# Patient Record
Sex: FEMALE | Race: WHITE | Employment: OTHER
[De-identification: names, ages, dates, MRNs, and addresses within clinical notes are randomized per-mention and may not be internally consistent; named-entity substitution may affect disease eponyms.]

---

## 2021-02-02 PROBLEM — E78.2 MIXED HYPERLIPIDEMIA: Status: ACTIVE | Noted: 2021-02-02

## 2021-02-02 PROBLEM — E55.9 VITAMIN D DEFICIENCY: Status: ACTIVE | Noted: 2021-02-02

## 2021-02-02 PROBLEM — D18.09 FACIAL HEMANGIOMA: Status: ACTIVE | Noted: 2021-02-02

## 2021-02-02 PROBLEM — E11.9 TYPE 2 DIABETES MELLITUS WITHOUT COMPLICATION, WITHOUT LONG-TERM CURRENT USE OF INSULIN (HCC): Status: ACTIVE | Noted: 2021-02-02

## 2021-02-02 PROBLEM — I10 ESSENTIAL HYPERTENSION: Status: ACTIVE | Noted: 2021-02-02

## 2021-07-21 PROBLEM — L25.9 CONTACT DERMATITIS: Status: ACTIVE | Noted: 2021-07-21

## 2021-07-21 PROBLEM — R63.5 WEIGHT GAIN: Status: ACTIVE | Noted: 2021-07-21

## 2022-03-18 PROBLEM — I10 ESSENTIAL HYPERTENSION: Status: ACTIVE | Noted: 2021-02-02

## 2022-03-19 PROBLEM — E55.9 VITAMIN D DEFICIENCY: Status: ACTIVE | Noted: 2021-02-02

## 2022-03-19 PROBLEM — L25.9 CONTACT DERMATITIS: Status: ACTIVE | Noted: 2021-07-21

## 2022-03-19 PROBLEM — E78.2 MIXED HYPERLIPIDEMIA: Status: ACTIVE | Noted: 2021-02-02

## 2022-03-20 PROBLEM — E11.9 TYPE 2 DIABETES MELLITUS WITHOUT COMPLICATION, WITHOUT LONG-TERM CURRENT USE OF INSULIN (HCC): Status: ACTIVE | Noted: 2021-02-02

## 2022-03-20 PROBLEM — D18.09 FACIAL HEMANGIOMA: Status: ACTIVE | Noted: 2021-02-02

## 2022-03-20 PROBLEM — R63.5 WEIGHT GAIN: Status: ACTIVE | Noted: 2021-07-21

## 2022-04-07 ENCOUNTER — NURSE TRIAGE (OUTPATIENT)
Dept: OTHER | Facility: CLINIC | Age: 57
End: 2022-04-07

## 2022-04-07 NOTE — TELEPHONE ENCOUNTER
Received call from stephen at [facility] with Red Flag Complaint. Subjective: Caller states \"difficulty breathing \"     Current Symptoms:   61 y/o with shortness of breath    Shortness of breath     Onset: 5-6 months     Associated Symptoms:   Pt reports recent weight gain   Not able to exercise she does need knee surgery    Coughing and clearing  Of throat   Can hear adverse lung sounds when  Clearing of throat   \"Feels like lungs are hurting with walking \"  Smoked for 30 years pt states she vapes now   covid in December of 2020  Uncontrolled  Blood sugar 119*-last  One taken     Intermittent heart palpitations  - once every few days       Pain Severity: n/a      Temperature:denies     What has been tried: n/a     Recommended disposition: Go to Office Now    Care advice provided, patient verbalizes understanding; denies any other questions or concerns; instructed to call back for any new or worsening symptoms. Patient/Caller agrees with recommended disposition; writer provided warm transfer to [facility] at [facility] for appointment scheduling    Attention Provider: Thank you for allowing me to participate in the care of your patient. The patient was connected to triage in response to information provided to the ECC. Please do not respond through this encounter as the response is not directed to a shared pool.       Reason for Disposition   MILD difficulty breathing (e.g., minimal/no SOB at rest, SOB with walking, pulse < 100) of new-onset or worse than normal    Protocols used: BREATHING DIFFICULTY-ADULT-OH

## 2022-04-12 PROBLEM — R06.02 SHORTNESS OF BREATH ON EXERTION: Status: ACTIVE | Noted: 2022-04-12

## 2022-04-12 PROBLEM — E66.01 MORBID OBESITY WITH BMI OF 50.0-59.9, ADULT (HCC): Status: ACTIVE | Noted: 2022-04-12

## 2022-05-31 RX ORDER — GABAPENTIN 300 MG/1
CAPSULE ORAL
Qty: 240 CAPSULE | Refills: 2 | Status: SHIPPED | OUTPATIENT
Start: 2022-05-31 | End: 2022-08-08 | Stop reason: SDUPTHER

## 2022-06-27 RX ORDER — DULOXETIN HYDROCHLORIDE 60 MG/1
CAPSULE, DELAYED RELEASE ORAL
Qty: 90 CAPSULE | Refills: 2 | Status: SHIPPED | OUTPATIENT
Start: 2022-06-27 | End: 2022-08-08 | Stop reason: SDUPTHER

## 2022-07-05 ENCOUNTER — NURSE TRIAGE (OUTPATIENT)
Dept: OTHER | Facility: CLINIC | Age: 57
End: 2022-07-05

## 2022-07-05 NOTE — TELEPHONE ENCOUNTER
Received call from Sybil at [facility] with [employer]. Subjective: Caller states \"The other day I got lost. I have been doing some things and having confusion. I want to be taken off the Cymbalta. \"     Current Symptoms: confusion, nausea \"every other day\"     Onset:   A few years, thinks it has been getting worse in the past few months. Pain Severity: chronic pain, 4/10    Temperature: none     What has been tried: Prozac in the past,     Recommended disposition: See in Office Within [address] advice provided, patient verbalizes understanding; denies any other questions or concerns; instructed to call back for any new or worsening symptoms. Patient/Caller agrees with recommended disposition; writer provided warm transfer to [facility] at [facility] for appointment scheduling     Attention Provider: Thank you for allowing me to participate in the care of your patient. The patient was connected to triage in response to information provided to the ECC/PSC. Please do not respond through this encounter as the response is not directed to a shared pool.       Reason for Disposition   [1] Longstanding confusion (e.g., dementia, stroke) AND [2] NO worsening or change    Protocols used: CONFUSION - DELIRIUM-ADULT-

## 2022-07-18 ENCOUNTER — OFFICE VISIT (OUTPATIENT)
Dept: FAMILY MEDICINE CLINIC | Facility: CLINIC | Age: 57
End: 2022-07-18
Payer: COMMERCIAL

## 2022-07-18 VITALS
HEIGHT: 67 IN | SYSTOLIC BLOOD PRESSURE: 132 MMHG | TEMPERATURE: 98 F | BODY MASS INDEX: 45.99 KG/M2 | HEART RATE: 86 BPM | DIASTOLIC BLOOD PRESSURE: 86 MMHG | OXYGEN SATURATION: 96 % | WEIGHT: 293 LBS

## 2022-07-18 DIAGNOSIS — R70.0 ELEVATED SED RATE: ICD-10-CM

## 2022-07-18 DIAGNOSIS — R41.82 ACUTE ON CHRONIC ALTERATION IN MENTAL STATUS: Primary | ICD-10-CM

## 2022-07-18 DIAGNOSIS — M25.50 ARTHRALGIA, UNSPECIFIED JOINT: ICD-10-CM

## 2022-07-18 DIAGNOSIS — Z87.891 HISTORY OF SMOKING 30 OR MORE PACK YEARS: ICD-10-CM

## 2022-07-18 DIAGNOSIS — R41.3 MEMORY LOSS: ICD-10-CM

## 2022-07-18 DIAGNOSIS — R79.82 ELEVATED C-REACTIVE PROTEIN (CRP): ICD-10-CM

## 2022-07-18 PROBLEM — L91.8 SKIN TAG: Status: ACTIVE | Noted: 2019-04-24

## 2022-07-18 PROBLEM — K21.9 GASTROESOPHAGEAL REFLUX DISEASE WITHOUT ESOPHAGITIS: Status: ACTIVE | Noted: 2020-11-10

## 2022-07-18 PROBLEM — L29.9 PRURITUS: Status: ACTIVE | Noted: 2020-09-14

## 2022-07-18 PROBLEM — K63.5 POLYP OF COLON: Status: ACTIVE | Noted: 2019-02-04

## 2022-07-18 PROBLEM — I87.2 VENOUS STASIS DERMATITIS OF BOTH LOWER EXTREMITIES: Status: ACTIVE | Noted: 2019-02-04

## 2022-07-18 PROBLEM — H02.834 DERMATOCHALASIS OF BOTH UPPER EYELIDS: Status: ACTIVE | Noted: 2020-06-02

## 2022-07-18 PROBLEM — H02.831 DERMATOCHALASIS OF BOTH UPPER EYELIDS: Status: ACTIVE | Noted: 2020-06-02

## 2022-07-18 PROBLEM — Q82.5 BIRTH MARK: Status: ACTIVE | Noted: 2019-02-04

## 2022-07-18 PROBLEM — I83.893: Status: ACTIVE | Noted: 2019-04-23

## 2022-07-18 PROBLEM — F41.0 PANIC ATTACKS: Status: ACTIVE | Noted: 2020-11-10

## 2022-07-18 PROBLEM — J06.9 VIRAL URI WITH COUGH: Status: ACTIVE | Noted: 2020-12-30

## 2022-07-18 PROBLEM — N30.01 ACUTE CYSTITIS WITH HEMATURIA: Status: ACTIVE | Noted: 2019-11-13

## 2022-07-18 PROBLEM — Q27.9 CAPILLARY MALFORMATION: Status: ACTIVE | Noted: 2019-09-27

## 2022-07-18 PROBLEM — R60.0 LOCALIZED EDEMA: Status: ACTIVE | Noted: 2020-11-10

## 2022-07-18 PROBLEM — H02.9 LESION OF RIGHT LOWER EYELID: Status: ACTIVE | Noted: 2019-09-20

## 2022-07-18 PROBLEM — R26.89 BALANCE PROBLEMS: Status: ACTIVE | Noted: 2019-08-05

## 2022-07-18 PROBLEM — H02.423 MYOGENIC PTOSIS OF EYELID OF BOTH EYES: Status: ACTIVE | Noted: 2020-06-02

## 2022-07-18 PROBLEM — B35.1 ONYCHOMYCOSIS OF LEFT GREAT TOE: Status: ACTIVE | Noted: 2018-10-23

## 2022-07-18 LAB
25(OH)D3 SERPL-MCNC: 31.1 NG/ML (ref 30–100)
ALBUMIN SERPL-MCNC: 3.5 G/DL (ref 3.5–5)
ALBUMIN/GLOB SERPL: 0.8 {RATIO} (ref 1.2–3.5)
ALP SERPL-CCNC: 112 U/L (ref 50–136)
ALT SERPL-CCNC: 46 U/L (ref 12–65)
ANION GAP SERPL CALC-SCNC: 8 MMOL/L (ref 7–16)
AST SERPL-CCNC: 39 U/L (ref 15–37)
BILIRUB SERPL-MCNC: 0.3 MG/DL (ref 0.2–1.1)
BUN SERPL-MCNC: 18 MG/DL (ref 6–23)
CALCIUM SERPL-MCNC: 9.1 MG/DL (ref 8.3–10.4)
CHLORIDE SERPL-SCNC: 105 MMOL/L (ref 98–107)
CK SERPL-CCNC: 154 U/L (ref 21–215)
CO2 SERPL-SCNC: 28 MMOL/L (ref 21–32)
CREAT SERPL-MCNC: 0.8 MG/DL (ref 0.6–1)
CRP SERPL-MCNC: 1.4 MG/DL (ref 0–0.9)
FERRITIN SERPL-MCNC: 31 NG/ML (ref 8–388)
FOLATE SERPL-MCNC: 14.5 NG/ML (ref 3.1–17.5)
GLOBULIN SER CALC-MCNC: 4.2 G/DL (ref 2.3–3.5)
GLUCOSE SERPL-MCNC: 107 MG/DL (ref 65–100)
IRON SERPL-MCNC: 54 UG/DL (ref 35–150)
POTASSIUM SERPL-SCNC: 4.3 MMOL/L (ref 3.5–5.1)
PROT SERPL-MCNC: 7.7 G/DL (ref 6.3–8.2)
SODIUM SERPL-SCNC: 141 MMOL/L (ref 136–145)
VIT B12 SERPL-MCNC: 422 PG/ML (ref 193–986)

## 2022-07-18 PROCEDURE — 99214 OFFICE O/P EST MOD 30 MIN: CPT | Performed by: NURSE PRACTITIONER

## 2022-07-18 ASSESSMENT — ENCOUNTER SYMPTOMS
ABDOMINAL PAIN: 0
WHEEZING: 0
SINUS PRESSURE: 0
NAUSEA: 0
EYE PAIN: 0
CONSTIPATION: 0
COUGH: 0
SORE THROAT: 0
SINUS PAIN: 0
BLOOD IN STOOL: 0
ABDOMINAL DISTENTION: 0
CHEST TIGHTNESS: 0
DIARRHEA: 0
VOMITING: 0
COLOR CHANGE: 0
SHORTNESS OF BREATH: 0

## 2022-07-18 ASSESSMENT — PATIENT HEALTH QUESTIONNAIRE - PHQ9
SUM OF ALL RESPONSES TO PHQ9 QUESTIONS 1 & 2: 0
2. FEELING DOWN, DEPRESSED OR HOPELESS: 0
SUM OF ALL RESPONSES TO PHQ QUESTIONS 1-9: 0
1. LITTLE INTEREST OR PLEASURE IN DOING THINGS: 0
SUM OF ALL RESPONSES TO PHQ QUESTIONS 1-9: 0

## 2022-07-18 NOTE — PROGRESS NOTES
Jonathan Sinha (:  1965) is a [address] y.o. female,Established patient, here for evaluation of the following chief complaint(s):  Referral - General (DERM, RHEM, PULM, NEURO AND GASTRO  )         ASSESSMENT/PLAN:  1. Acute on chronic alteration in mental status  -     CBC with Auto Differential; Future  -     Ferritin; Future  -     Vitamin D 25 Hydroxy; Future  -     Vitamin B12; Future  -     Folate; Future  -     Iron; Future  -     MRI BRAIN WO CONTRAST; Future  -     Josie Francisco MD, Neurology, [facility]  2. Memory loss  -     MRI BRAIN WO CONTRAST; Future  -     Josie Francisco MD, Neurology, [facility]  3. Arthralgia, unspecified joint  -     Comprehensive Metabolic Panel; Future  -     BENOIT, Direct, w/Reflex; Future  -     CCP Antibodies, IGG/IGA; Future  -     C-Reactive Protein; Future  -     CK; Future  -     Rheumatoid Factor; Future  -     Sedimentation Rate; Future  4. History of smoking 30 or more pack years  -     [facility] [place] [facility]    Return in about 1 month (around 2022). Subjective   SUBJECTIVE/OBJECTIVE:  Presents today with concerns in regards to her memory and mentation. She reports getting a glass to pour herself soda and ends up filling the glass with ketchup. She reports showering and forgetting to dry off. She denies any visual distortions or new dizziness. Denies syncope. She is requesting a referral to Neuro. I will order imaging as well. Recent upper respiratory infection (6 weeks ago). She went to  and was told she had pneumonia. She reports complete resolution of her SOB however she smoked 2 packs of cigarettes for 35 years and quit 8 years ago. She does vape. She is requesting a referral to Pulmonary for evaluation. C/o joint pain and myalgias (generalized) that have not improved over time. Will add lab work and call her with results. She is in agreement with the plan. Denies sob, chest pain, palpitations or fever. Review of Systems   Constitutional:  Negative for activity change, appetite change, chills, diaphoresis, fatigue and fever. HENT:  Negative for congestion, ear pain, sinus pressure, sinus pain and sore throat. Eyes:  Negative for pain and visual disturbance. Respiratory:  Negative for cough, chest tightness, shortness of breath and wheezing. Cardiovascular:  Negative for chest pain, palpitations and leg swelling. Gastrointestinal:  Negative for abdominal distention, abdominal pain, blood in stool, constipation, diarrhea, nausea and vomiting. Endocrine: Negative for polydipsia, polyphagia and polyuria. Genitourinary:  Negative for dysuria, flank pain, hematuria and urgency. Musculoskeletal:  Positive for arthralgias and myalgias. Skin:  Negative for color change and rash. Neurological:  Negative for dizziness, tremors, weakness and headaches. Loss of memory and AMS progressing   Psychiatric/Behavioral:  Negative for agitation, behavioral problems, dysphoric mood and suicidal ideas. The patient is not nervous/anxious. Objective   Physical Exam  Vitals and nursing note reviewed. Constitutional:       Appearance: Normal appearance. HENT:      Head: Normocephalic and atraumatic. Eyes:      Extraocular Movements: Extraocular movements intact. Conjunctiva/sclera: Conjunctivae normal.      Pupils: Pupils are equal, round, and reactive to light. Cardiovascular:      Rate and Rhythm: Normal rate and regular rhythm. Pulses: Normal pulses. Heart sounds: Normal heart sounds. Pulmonary:      Effort: Pulmonary effort is normal.      Breath sounds: Normal breath sounds. Musculoskeletal:         General: Normal range of motion. Cervical back: Neck supple. Skin:     General: Skin is warm and dry. Capillary Refill: Capillary refill takes less than 2 seconds. Neurological:      General: No focal deficit present.       Mental Status: She is alert and oriented to person, place, and time. Mental status is at baseline. Psychiatric:         Mood and Affect: Mood normal.         Behavior: Behavior normal.         Thought Content: Thought content normal.         Judgment: Judgment normal.          On this date 7/18/2022 I have spent 30 minutes reviewing previous notes, test results and face to face with the patient discussing the diagnosis and importance of compliance with the treatment plan as well as documenting on the day of the visit. An electronic signature was used to authenticate this note.     --CARSON Tilley - CNP

## 2022-07-19 LAB
BASOPHILS # BLD: 0 K/UL (ref 0–0.2)
BASOPHILS NFR BLD: 0 % (ref 0–2)
DIFFERENTIAL METHOD BLD: ABNORMAL
EOSINOPHIL # BLD: 0.3 K/UL (ref 0–0.8)
EOSINOPHIL NFR BLD: 4 % (ref 0.5–7.8)
ERYTHROCYTE [DISTWIDTH] IN BLOOD BY AUTOMATED COUNT: 15.3 % (ref 11.9–14.6)
ERYTHROCYTE [SEDIMENTATION RATE] IN BLOOD: 56 MM/HR (ref 0–30)
HCT VFR BLD AUTO: 41.3 % (ref 35.8–46.3)
HGB BLD-MCNC: 12.4 G/DL (ref 11.7–15.4)
IMM GRANULOCYTES # BLD AUTO: 0 K/UL (ref 0–0.5)
IMM GRANULOCYTES NFR BLD AUTO: 0 % (ref 0–5)
LYMPHOCYTES # BLD: 2.4 K/UL (ref 0.5–4.6)
LYMPHOCYTES NFR BLD: 35 % (ref 13–44)
MCH RBC QN AUTO: 25.9 PG (ref 26.1–32.9)
MCHC RBC AUTO-ENTMCNC: 30 G/DL (ref 31.4–35)
MCV RBC AUTO: 86.2 FL (ref 79.6–97.8)
MONOCYTES # BLD: 0.5 K/UL (ref 0.1–1.3)
MONOCYTES NFR BLD: 7 % (ref 4–12)
NEUTS SEG # BLD: 3.6 K/UL (ref 1.7–8.2)
NEUTS SEG NFR BLD: 53 % (ref 43–78)
NRBC # BLD: 0 K/UL (ref 0–0.2)
PLATELET # BLD AUTO: 200 K/UL (ref 150–450)
PMV BLD AUTO: 11 FL (ref 9.4–12.3)
RBC # BLD AUTO: 4.79 M/UL (ref 4.05–5.2)
RHEUMATOID FACT SER QL LA: NEGATIVE
WBC # BLD AUTO: 6.8 K/UL (ref 4.3–11.1)

## 2022-07-20 LAB — ANA SER QL: NEGATIVE

## 2022-07-21 DIAGNOSIS — Z12.11 SCREENING FOR MALIGNANT NEOPLASM OF COLON: Primary | ICD-10-CM

## 2022-07-21 DIAGNOSIS — Z12.11 SCREENING FOR MALIGNANT NEOPLASM OF COLON: ICD-10-CM

## 2022-07-21 LAB — CCP IGA+IGG SERPL IA-ACNC: 9 UNITS (ref 0–19)

## 2022-07-22 LAB — HEMOCCULT STL QL: NEGATIVE

## 2022-08-08 DIAGNOSIS — E78.2 MIXED HYPERLIPIDEMIA: ICD-10-CM

## 2022-08-08 DIAGNOSIS — F41.0 PANIC ATTACKS: ICD-10-CM

## 2022-08-08 DIAGNOSIS — I87.2 VENOUS STASIS DERMATITIS OF BOTH LOWER EXTREMITIES: ICD-10-CM

## 2022-08-08 DIAGNOSIS — K21.9 GASTROESOPHAGEAL REFLUX DISEASE WITHOUT ESOPHAGITIS: ICD-10-CM

## 2022-08-08 DIAGNOSIS — I10 ESSENTIAL HYPERTENSION: Primary | ICD-10-CM

## 2022-08-08 DIAGNOSIS — E11.9 TYPE 2 DIABETES MELLITUS WITHOUT COMPLICATION, WITHOUT LONG-TERM CURRENT USE OF INSULIN (HCC): ICD-10-CM

## 2022-08-08 RX ORDER — OMEPRAZOLE 20 MG/1
20 CAPSULE, DELAYED RELEASE ORAL DAILY
Qty: 90 CAPSULE | Refills: 3 | Status: SHIPPED | OUTPATIENT
Start: 2022-08-08

## 2022-08-08 RX ORDER — DULOXETIN HYDROCHLORIDE 60 MG/1
CAPSULE, DELAYED RELEASE ORAL
Qty: 90 CAPSULE | Refills: 3 | Status: SHIPPED | OUTPATIENT
Start: 2022-08-08

## 2022-08-08 RX ORDER — HYOSCYAMINE SULFATE 0.125 MG
0.12 TABLET,DISINTEGRATING ORAL EVERY 4 HOURS PRN
Qty: 180 TABLET | Refills: 3 | Status: SHIPPED | OUTPATIENT
Start: 2022-08-08

## 2022-08-08 RX ORDER — GABAPENTIN 300 MG/1
CAPSULE ORAL
Qty: 240 CAPSULE | Refills: 3 | Status: SHIPPED | OUTPATIENT
Start: 2022-08-08 | End: 2022-09-08

## 2022-08-08 RX ORDER — METOPROLOL TARTRATE 50 MG/1
50 TABLET, FILM COATED ORAL 2 TIMES DAILY
Qty: 120 TABLET | Refills: 3 | Status: SHIPPED | OUTPATIENT
Start: 2022-08-08

## 2022-08-08 RX ORDER — ATORVASTATIN CALCIUM 20 MG/1
20 TABLET, FILM COATED ORAL DAILY
Qty: 90 TABLET | Refills: 3 | Status: SHIPPED | OUTPATIENT
Start: 2022-08-08

## 2022-08-30 ENCOUNTER — TELEPHONE (OUTPATIENT)
Dept: PULMONOLOGY | Age: 57
End: 2022-08-30

## 2022-08-30 NOTE — TELEPHONE ENCOUNTER
Called pt  to offer a virtual visit after pt did not show up today at 3:30 for their appt. No answer.

## 2023-01-12 ENCOUNTER — OFFICE VISIT (OUTPATIENT)
Dept: INTERNAL MEDICINE CLINIC | Facility: CLINIC | Age: 58
End: 2023-01-12
Payer: COMMERCIAL

## 2023-01-12 DIAGNOSIS — E11.42 TYPE 2 DIABETES MELLITUS WITH PERIPHERAL NEUROPATHY (HCC): Primary | ICD-10-CM

## 2023-01-12 DIAGNOSIS — Z76.89 ENCOUNTER TO ESTABLISH CARE: ICD-10-CM

## 2023-01-12 DIAGNOSIS — R06.09 DYSPNEA ON EXERTION: ICD-10-CM

## 2023-01-12 DIAGNOSIS — G44.52 NEW DAILY PERSISTENT HEADACHE: ICD-10-CM

## 2023-01-12 DIAGNOSIS — E55.9 VITAMIN D DEFICIENCY: ICD-10-CM

## 2023-01-12 DIAGNOSIS — R79.82 ELEVATED C-REACTIVE PROTEIN (CRP): ICD-10-CM

## 2023-01-12 DIAGNOSIS — R70.0 ELEVATED SED RATE: ICD-10-CM

## 2023-01-12 DIAGNOSIS — R46.89 COGNITIVE AND BEHAVIORAL CHANGES: ICD-10-CM

## 2023-01-12 DIAGNOSIS — M25.50 DIFFUSE ARTHRALGIA: ICD-10-CM

## 2023-01-12 DIAGNOSIS — R07.9 CHEST PAIN ON EXERTION: ICD-10-CM

## 2023-01-12 DIAGNOSIS — E78.2 MIXED HYPERLIPIDEMIA: ICD-10-CM

## 2023-01-12 DIAGNOSIS — R41.89 COGNITIVE AND BEHAVIORAL CHANGES: ICD-10-CM

## 2023-01-12 DIAGNOSIS — E11.42 TYPE 2 DIABETES MELLITUS WITH PERIPHERAL NEUROPATHY (HCC): ICD-10-CM

## 2023-01-12 PROBLEM — E11.9 TYPE 2 DIABETES MELLITUS WITHOUT COMPLICATION, WITHOUT LONG-TERM CURRENT USE OF INSULIN (HCC): Status: RESOLVED | Noted: 2021-02-02 | Resolved: 2023-01-12

## 2023-01-12 LAB
CREAT UR-MCNC: 85 MG/DL
MICROALBUMIN UR-MCNC: 0.66 MG/DL (ref 0–3)
MICROALBUMIN/CREAT UR-RTO: 8 MG/G (ref 0–30)

## 2023-01-12 PROCEDURE — 3080F DIAST BP >= 90 MM HG: CPT | Performed by: PHYSICIAN ASSISTANT

## 2023-01-12 PROCEDURE — 3075F SYST BP GE 130 - 139MM HG: CPT | Performed by: PHYSICIAN ASSISTANT

## 2023-01-12 PROCEDURE — 93000 ELECTROCARDIOGRAM COMPLETE: CPT | Performed by: PHYSICIAN ASSISTANT

## 2023-01-12 PROCEDURE — 99205 OFFICE O/P NEW HI 60 MIN: CPT | Performed by: PHYSICIAN ASSISTANT

## 2023-01-12 ASSESSMENT — ENCOUNTER SYMPTOMS
WHEEZING: 1
SHORTNESS OF BREATH: 1
DIARRHEA: 1
COUGH: 1
CONSTIPATION: 1

## 2023-01-12 ASSESSMENT — PATIENT HEALTH QUESTIONNAIRE - PHQ9
SUM OF ALL RESPONSES TO PHQ QUESTIONS 1-9: 0
SUM OF ALL RESPONSES TO PHQ9 QUESTIONS 1 & 2: 0
SUM OF ALL RESPONSES TO PHQ QUESTIONS 1-9: 0
1. LITTLE INTEREST OR PLEASURE IN DOING THINGS: 0
SUM OF ALL RESPONSES TO PHQ QUESTIONS 1-9: 0
SUM OF ALL RESPONSES TO PHQ QUESTIONS 1-9: 0
2. FEELING DOWN, DEPRESSED OR HOPELESS: 0
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
## 2023-01-16 VITALS
TEMPERATURE: 96.9 F | WEIGHT: 293 LBS | BODY MASS INDEX: 45.99 KG/M2 | SYSTOLIC BLOOD PRESSURE: 134 MMHG | DIASTOLIC BLOOD PRESSURE: 96 MMHG | HEIGHT: 67 IN | HEART RATE: 83 BPM | OXYGEN SATURATION: 96 %

## 2023-01-17 NOTE — PATIENT INSTRUCTIONS
Recommend trial use of Albuterol 15 minutes prior to next planned exertion to see if chest pain still occurs so we can try to sort through cardiac vs pulmonary source of discomfort  Reminded of the importance of staying well hydrated with plenty of water  Monitor blood pressure 2-3 times/week and keep record to bring to next appointment  Continue chronic medications as prescribed  Monitor blood sugar daily and keep record to bring to next appointment

## 2023-01-23 RX ORDER — SITAGLIPTIN 50 MG/1
TABLET, FILM COATED ORAL
Qty: 30 TABLET | OUTPATIENT
Start: 2023-01-23

## 2023-01-26 ENCOUNTER — PATIENT MESSAGE (OUTPATIENT)
Dept: INTERNAL MEDICINE CLINIC | Facility: CLINIC | Age: 58
End: 2023-01-26

## 2023-01-26 NOTE — TELEPHONE ENCOUNTER
From: Kaleb Fried  To: Olimpia Singletary  Sent: 1/26/2023 10:10 AM EST  Subject: Refill on Januvia 50 mg    Hi! Hope everything is well! I have no refills on my Januvia. Can you please call it in to [facility] in [facility] [place]. # is [phone] , [address] I have 4 left. Here for at least 6 wks. Sister in law has had a brain aneurysm bust and has a long haul ahead. Thank you so much.

## 2023-01-30 RX ORDER — SITAGLIPTIN 50 MG/1
TABLET, FILM COATED ORAL
Qty: 30 TABLET | OUTPATIENT
Start: 2023-01-30

## 2023-03-30 ENCOUNTER — TELEPHONE (OUTPATIENT)
Dept: INTERNAL MEDICINE CLINIC | Facility: CLINIC | Age: 58
End: 2023-03-30

## 2023-04-07 ENCOUNTER — TELEPHONE (OUTPATIENT)
Dept: INTERNAL MEDICINE CLINIC | Facility: CLINIC | Age: 58
End: 2023-04-07

## 2023-04-07 NOTE — TELEPHONE ENCOUNTER
KLEVEROVM to schedule an appointment with Christine Lawson to review labs, medication refills, and to discuss referrals as requested by patient.

## 2023-04-10 ENCOUNTER — TELEPHONE (OUTPATIENT)
Dept: INTERNAL MEDICINE CLINIC | Facility: CLINIC | Age: 58
End: 2023-04-10

## 2023-04-10 DIAGNOSIS — I87.2 VENOUS STASIS DERMATITIS OF BOTH LOWER EXTREMITIES: ICD-10-CM

## 2023-04-11 RX ORDER — GABAPENTIN 300 MG/1
CAPSULE ORAL
Qty: 240 CAPSULE | Refills: 3 | OUTPATIENT
Start: 2023-04-11 | End: 2023-05-11

## 2023-04-17 ENCOUNTER — OFFICE VISIT (OUTPATIENT)
Dept: ORTHOPEDIC SURGERY | Age: 58
End: 2023-04-17

## 2023-04-17 DIAGNOSIS — M25.561 ACUTE PAIN OF RIGHT KNEE: Primary | ICD-10-CM

## 2023-04-17 RX ORDER — DICLOFENAC SODIUM 75 MG/1
75 TABLET, DELAYED RELEASE ORAL 2 TIMES DAILY
Qty: 60 TABLET | Refills: 0 | Status: SHIPPED | OUTPATIENT
Start: 2023-04-17 | End: 2023-05-17

## 2023-04-17 NOTE — PROGRESS NOTES
Name: Teresa Smiley  YOB: 1965  Gender: female  MRN: [id number]      CC: Knee Pain (R)       HPI: Teresa Smiley is a 59 y.o. female who presents with Knee Pain (R)  Lauren Langford is a new patient who presents today with R knee pain. She reports this happened 3 weeks ago. She was walking the dog and noticed discomfort and as she was walking up the steps to her house she felt a loud pop which almost caused her to fall. No symptoms before this. The reports the pain posteriorly primarily, however she will have medial pain as well. She describes the sensation posteriorly as very taut, like it is on stretch. And the medial pain is more of an ache. She went to an urgent care that night and she had x rays done (this was in [place]). She was then given Prednisone, which she feels helped mildly. She denies instability besides the initial episode. No reports of locking or catching. She has the most discomfort when she is walking. No previous history of injury. .         ROS/Meds/PSH/PMH/FH/SH: I personally reviewed the patients standard intake form. Below are the pertinents    Tobacco:  reports that she quit smoking about 10 years ago. Her smoking use included cigarettes. She has a 35.00 pack-year smoking history. She has never used smokeless tobacco.  Diabetes: diabetic-non insulin  Other: none    Physical Examination:  General: no acute distress  Lungs: breathing easily  CV: regular rhythm by pulse  Right Knee: Tenderness palpation medial joint line pain at the extremes of motion of the medial joint line pain with Meño's of the medial joint line. Ligamentously stable x4. Tenderness palpation over the pes region. Imaging:   Knee XR: 4 views     Clinical Indication  1. Acute pain of right knee           Report: AP, lateral, PA flexion, sunrise views of the Right knee demonstrates no acute fracture dislocation, mild degenerative changes.   Postsurgical changes of the tibial plateau of the contralateral

## 2023-04-19 PROBLEM — E11.9 TYPE 2 DIABETES MELLITUS (HCC): Status: ACTIVE | Noted: 2023-04-19

## 2023-04-24 ENCOUNTER — OFFICE VISIT (OUTPATIENT)
Dept: ORTHOPEDIC SURGERY | Age: 58
End: 2023-04-24
Payer: COMMERCIAL

## 2023-04-24 ENCOUNTER — OFFICE VISIT (OUTPATIENT)
Dept: INTERNAL MEDICINE CLINIC | Facility: CLINIC | Age: 58
End: 2023-04-24
Payer: COMMERCIAL

## 2023-04-24 VITALS
DIASTOLIC BLOOD PRESSURE: 70 MMHG | WEIGHT: 293 LBS | SYSTOLIC BLOOD PRESSURE: 130 MMHG | TEMPERATURE: 97.2 F | HEART RATE: 77 BPM | BODY MASS INDEX: 45.99 KG/M2 | HEIGHT: 67 IN | OXYGEN SATURATION: 96 %

## 2023-04-24 DIAGNOSIS — E11.9 TYPE 2 DIABETES MELLITUS WITHOUT COMPLICATION, WITHOUT LONG-TERM CURRENT USE OF INSULIN (HCC): ICD-10-CM

## 2023-04-24 DIAGNOSIS — M17.11 ARTHRITIS OF RIGHT KNEE: ICD-10-CM

## 2023-04-24 DIAGNOSIS — S83.241A ACUTE MEDIAL MENISCUS TEAR OF RIGHT KNEE, INITIAL ENCOUNTER: Primary | ICD-10-CM

## 2023-04-24 DIAGNOSIS — I10 ESSENTIAL HYPERTENSION: ICD-10-CM

## 2023-04-24 DIAGNOSIS — M25.561 ACUTE PAIN OF RIGHT KNEE: ICD-10-CM

## 2023-04-24 DIAGNOSIS — B35.1 ONYCHOMYCOSIS OF MULTIPLE TOENAILS WITH TYPE 2 DIABETES MELLITUS (HCC): ICD-10-CM

## 2023-04-24 DIAGNOSIS — E11.69 ONYCHOMYCOSIS OF MULTIPLE TOENAILS WITH TYPE 2 DIABETES MELLITUS (HCC): ICD-10-CM

## 2023-04-24 DIAGNOSIS — R00.2 HEART PALPITATIONS: Primary | ICD-10-CM

## 2023-04-24 DIAGNOSIS — E11.42 DIABETIC POLYNEUROPATHY ASSOCIATED WITH TYPE 2 DIABETES MELLITUS (HCC): ICD-10-CM

## 2023-04-24 DIAGNOSIS — F41.0 PANIC ATTACKS: ICD-10-CM

## 2023-04-24 DIAGNOSIS — K21.9 GASTROESOPHAGEAL REFLUX DISEASE WITHOUT ESOPHAGITIS: ICD-10-CM

## 2023-04-24 DIAGNOSIS — E78.2 MIXED HYPERLIPIDEMIA: ICD-10-CM

## 2023-04-24 PROCEDURE — 3078F DIAST BP <80 MM HG: CPT | Performed by: NURSE PRACTITIONER

## 2023-04-24 PROCEDURE — 3074F SYST BP LT 130 MM HG: CPT | Performed by: NURSE PRACTITIONER

## 2023-04-24 PROCEDURE — 99214 OFFICE O/P EST MOD 30 MIN: CPT | Performed by: ORTHOPAEDIC SURGERY

## 2023-04-24 PROCEDURE — 99214 OFFICE O/P EST MOD 30 MIN: CPT | Performed by: NURSE PRACTITIONER

## 2023-04-24 RX ORDER — METOPROLOL TARTRATE 50 MG/1
50 TABLET, FILM COATED ORAL 2 TIMES DAILY
Qty: 60 TABLET | Refills: 0 | Status: SHIPPED | OUTPATIENT
Start: 2023-04-24

## 2023-04-24 RX ORDER — GABAPENTIN 300 MG/1
CAPSULE ORAL
Qty: 180 CAPSULE | Refills: 0 | Status: SHIPPED | OUTPATIENT
Start: 2023-04-24 | End: 2023-05-25

## 2023-04-24 RX ORDER — ATORVASTATIN CALCIUM 20 MG/1
20 TABLET, FILM COATED ORAL DAILY
Qty: 30 TABLET | Refills: 0 | Status: SHIPPED | OUTPATIENT
Start: 2023-04-24

## 2023-04-24 RX ORDER — DULOXETIN HYDROCHLORIDE 60 MG/1
CAPSULE, DELAYED RELEASE ORAL
Qty: 30 CAPSULE | Refills: 0 | Status: SHIPPED | OUTPATIENT
Start: 2023-04-24

## 2023-04-24 RX ORDER — OMEPRAZOLE 20 MG/1
20 CAPSULE, DELAYED RELEASE ORAL DAILY
Qty: 90 CAPSULE | Refills: 3 | Status: CANCELLED | OUTPATIENT
Start: 2023-04-24

## 2023-04-24 RX ORDER — ALBUTEROL SULFATE 90 UG/1
2 AEROSOL, METERED RESPIRATORY (INHALATION) EVERY 6 HOURS PRN
Qty: 18 G | Status: CANCELLED | OUTPATIENT
Start: 2023-04-24

## 2023-04-24 ASSESSMENT — PATIENT HEALTH QUESTIONNAIRE - PHQ9
1. LITTLE INTEREST OR PLEASURE IN DOING THINGS: 0
SUM OF ALL RESPONSES TO PHQ QUESTIONS 1-9: 0
SUM OF ALL RESPONSES TO PHQ9 QUESTIONS 1 & 2: 0
2. FEELING DOWN, DEPRESSED OR HOPELESS: 0
SUM OF ALL RESPONSES TO PHQ QUESTIONS 1-9: 0

## 2023-04-24 ASSESSMENT — ENCOUNTER SYMPTOMS
NAUSEA: 0
WHEEZING: 0
VOMITING: 0
ABDOMINAL PAIN: 0
SORE THROAT: 0
COUGH: 0
SHORTNESS OF BREATH: 1

## 2023-04-24 NOTE — PROGRESS NOTES
[facility]      2023    Patient Name: Jewel Friend  :  1965      Chief Complaint:  Chief Complaint   Patient presents with    Palpitations     Patient c/o heart racing episodes when smoking marijuana. HPI  Patient presents today with complaint of heart palpitations. She reports that this has been a problem off and on for about a year. Most recent episode occurred on 23 while \"smoking pot\". She has a pulse oximeter at home and checked it during the episode; O2 sat was normal, but HR was 137 at rest. Palpitations lasted a couple of hours before resolving on their own. She reports mild chest pain during the episode but denies any SOB, dizziness, lightheadedness, abdominal pain or nausea. She has been having these episodes a couple of times weekly for the past year. Has never been evaluated. EKG in January was normal sinus rhythm. She also has complaint today of thick toenails. Reports history of diabetic neuropathy, for which she takes gabapentin about three times daily. She has previously been seen by podiatry for toenail trimming and is requesting referral to reestablish care. Patient is due for fasting labs. Orders placed at last visit in January.        Past Medical History:   Diagnosis Date    Contact dermatitis 2021    COVID-19 2019    Diabetes ([address]     GERD (gastroesophageal reflux disease)     Hypercholesterolemia     Hypertension     Overactive bladder     Vitamin D deficiency        Past Surgical History:   Procedure Laterality Date    COLONOSCOPY  2019    8 mm Sessile Cecal Polyp, Moderately Tortuous Colon, Multiple Small Mouthed Diverticula in Sigmoid Colon, Small Internal Hemorrhoids    HYSTERECTOMY, TOTAL ABDOMINAL (CERVIX REMOVED)  1992    LASER RESURFACING      x 5 - Birthmark on Face    UPPER GASTROINTESTINAL ENDOSCOPY  2019    1 cm Hiatal Hernia, Diffuse Mild Inflammation       Family History   Problem Relation Age of Onset

## 2023-04-24 NOTE — PROGRESS NOTES
Name: Oscar Perales  YOB: 1965  Gender: female  MRN: [id number]      CC: Results (Right Knee MRI)       HPI: Oscar Perales is a 59 y.o. female who returns for follow up and MRI results on right knee pain. Her knee pain has improved somewhat as the swelling starts to go down but she still having pain over the medial aspect with increased activity. .        Physical Examination:  General: no acute distress  Lungs: breathing easily  CV: regular rhythm by pulse  Right Knee: Tenderness to palpation of the medial joint line pain at the extremes of motion of the medial joint line. Pain with Meño's over the medial joint line. Ligamentously stable x4    Imaging:   I reviewed the MRI scan of her right knee which demonstrates a full-thickness radial tear of the root attachment of the posterior horn of the medial meniscus with some intrasubstance degeneration as well as extrusion of the mid body. There is some intrasubstance degenerative tearing of the lateral meniscus with some extrusion of that. There is diffuse chondral thinning tricompartmentally and some subchondral cystic changes in the posterior aspect the medial compartment with advanced degenerative changes of the patellofemoral compartment. All imaging interpreted by myself Davy Yeh MD independent of radiology review    Assessment:     ICD-10-CM    1. Acute medial meniscus tear of right knee, initial encounter  S83.241A       2. Acute pain of right knee  M25.561 Ambulatory referral to Physical Therapy      3. Arthritis of right knee  M17.11            Plan: We reviewed the images together and discussed discussed the severity of her knee pathology with a radial tear of the root attachment of the medial meniscus in addition to her arthritic changes and her BMI 54 this is a bad combination.   We discussed surgical and nonsurgical options including meniscus root repair without do not think she is a candidate for as well as potential

## 2023-04-25 DIAGNOSIS — E11.9 TYPE 2 DIABETES MELLITUS WITHOUT COMPLICATION, WITHOUT LONG-TERM CURRENT USE OF INSULIN (HCC): ICD-10-CM

## 2023-04-25 RX ORDER — SITAGLIPTIN 50 MG/1
TABLET, FILM COATED ORAL
Qty: 30 TABLET | Refills: 2 | OUTPATIENT
Start: 2023-04-25

## 2023-05-17 ENCOUNTER — EVALUATION (OUTPATIENT)
Age: 58
End: 2023-05-17

## 2023-05-17 DIAGNOSIS — M25.561 ACUTE PAIN OF RIGHT KNEE: Primary | ICD-10-CM

## 2023-05-17 DIAGNOSIS — R26.2 DIFFICULTY WALKING: ICD-10-CM

## 2023-05-17 DIAGNOSIS — M25.661 STIFFNESS OF RIGHT KNEE: ICD-10-CM

## 2023-05-17 DIAGNOSIS — M62.81 MUSCLE WEAKNESS: ICD-10-CM

## 2023-05-17 NOTE — PROGRESS NOTES
modifications and more rest    Patient Stated Goals: return to function with less pain    OBJECTIVE EXAMINATION     Functional Outcome Questionnaire: Lower Extremity Functional Scale: 21/80= 26% function   Observation:   Posture: Weight shift L and R knee held in flexion  Swelling/Edema: minimal R knee  Skin Integrity: normal   Palpation: tender to palpation R knee at medial/lateral joint lines, medial hamstring tendons   Patella Mobility: WFL    A/PROM Measures:    Right Left Comment   Knee Extension -3 -1    Knee Flexion 98 101 L knee range limited after ORIF   Hip [facility]    Ankle [facility] WFL            Strength/MMT (0-5 Scale):   Right Left Comment   Knee Flexion 4 5    Knee Extension 4 5    Quad set good good    Hip Flexion 3+ 5    Hip Extension 4 4 Tested supine   Hip Abduction 4 4    Ankle DF 5 5    Ankle PF 5 5      Increased R knee pain w/ resisted hip flexion, knee flexion/extension, ankle DF    Special Tests/Function:   Gait:  ambulates independently with an antalgic pattern, decreased R heel strike and push off, lacks R knee extension in midstance  Stair management:step-to leading left ascending, step-to leading right descending, unilateral handrail  Sit to stand: increased base of support, L weight shift, moderate UE use    Special tests:   Hamstring length: restricted B    Treatment provided today consisted of initial evaluation followed by: Therapeutic exercise (02346) x 25 min to address ROM/strength deficits and to develop an initial HEP as noted below. Patient Education on the condition/pathology, involved anatomy, and exercise rationale.     CLINICAL DECISION MAKING/ASSESSMENT     Personal Factors/co-morbidities affecting POC (1-2 Medium/3+High): age  body measurements  co-morbidities as previously noted   Problem List: (1-2 Low/ 3 Medium/ 4+ High) Pain  ROM limitations  Strength deficits  Impaired posture  Impaired transfers  Impaired gait  Impaired balance/proprioception  Decreased

## 2023-05-19 ENCOUNTER — TREATMENT (OUTPATIENT)
Age: 58
End: 2023-05-19

## 2023-05-19 ENCOUNTER — HOSPITAL ENCOUNTER (OUTPATIENT)
Dept: LAB | Age: 58
Discharge: HOME | End: 2023-05-22

## 2023-05-19 DIAGNOSIS — R41.89 COGNITIVE AND BEHAVIORAL CHANGES: ICD-10-CM

## 2023-05-19 DIAGNOSIS — R26.2 DIFFICULTY WALKING: ICD-10-CM

## 2023-05-19 DIAGNOSIS — M62.81 MUSCLE WEAKNESS: ICD-10-CM

## 2023-05-19 DIAGNOSIS — R46.89 COGNITIVE AND BEHAVIORAL CHANGES: ICD-10-CM

## 2023-05-19 DIAGNOSIS — E78.2 MIXED HYPERLIPIDEMIA: ICD-10-CM

## 2023-05-19 DIAGNOSIS — E55.9 VITAMIN D DEFICIENCY: Primary | ICD-10-CM

## 2023-05-19 DIAGNOSIS — E11.9 TYPE 2 DIABETES MELLITUS WITHOUT COMPLICATION, WITHOUT LONG-TERM CURRENT USE OF INSULIN (HCC): ICD-10-CM

## 2023-05-19 DIAGNOSIS — M25.661 STIFFNESS OF RIGHT KNEE: ICD-10-CM

## 2023-05-19 DIAGNOSIS — M25.561 ACUTE PAIN OF RIGHT KNEE: Primary | ICD-10-CM

## 2023-05-19 DIAGNOSIS — E55.9 VITAMIN D DEFICIENCY: ICD-10-CM

## 2023-05-19 LAB
25(OH)D3 SERPL-MCNC: 39.5 NG/ML (ref 30–100)
ALBUMIN SERPL-MCNC: 3.6 G/DL (ref 3.5–5)
ALBUMIN/GLOB SERPL: 0.9 (ref 0.4–1.6)
ALP SERPL-CCNC: 106 U/L (ref 50–136)
ALT SERPL-CCNC: 36 U/L (ref 12–65)
ANION GAP SERPL CALC-SCNC: 5 MMOL/L (ref 2–11)
AST SERPL-CCNC: 26 U/L (ref 15–37)
BASOPHILS # BLD: 0 K/UL (ref 0–0.2)
BASOPHILS NFR BLD: 0 % (ref 0–2)
BILIRUB SERPL-MCNC: 0.4 MG/DL (ref 0.2–1.1)
BUN SERPL-MCNC: 17 MG/DL (ref 6–23)
CALCIUM SERPL-MCNC: 8.9 MG/DL (ref 8.3–10.4)
CHLORIDE SERPL-SCNC: 105 MMOL/L (ref 101–110)
CHOLEST SERPL-MCNC: 147 MG/DL
CO2 SERPL-SCNC: 27 MMOL/L (ref 21–32)
CREAT SERPL-MCNC: 0.8 MG/DL (ref 0.6–1)
DIFFERENTIAL METHOD BLD: ABNORMAL
EOSINOPHIL # BLD: 0.2 K/UL (ref 0–0.8)
EOSINOPHIL NFR BLD: 3 % (ref 0.5–7.8)
ERYTHROCYTE [DISTWIDTH] IN BLOOD BY AUTOMATED COUNT: 15.9 % (ref 11.9–14.6)
EST. AVERAGE GLUCOSE BLD GHB EST-MCNC: 143 MG/DL
FOLATE SERPL-MCNC: 17.9 NG/ML (ref 3.1–17.5)
GLOBULIN SER CALC-MCNC: 4.1 G/DL (ref 2.8–4.5)
GLUCOSE SERPL-MCNC: 126 MG/DL (ref 65–100)
HBA1C MFR BLD: 6.6 % (ref 4.8–5.6)
HCT VFR BLD AUTO: 38.4 % (ref 35.8–46.3)
HDLC SERPL-MCNC: 48 MG/DL (ref 40–60)
HDLC SERPL: 3.1
HGB BLD-MCNC: 11.7 G/DL (ref 11.7–15.4)
IMM GRANULOCYTES # BLD AUTO: 0 K/UL (ref 0–0.5)
IMM GRANULOCYTES NFR BLD AUTO: 0 % (ref 0–5)
LDLC SERPL CALC-MCNC: 64 MG/DL
LYMPHOCYTES # BLD: 1.8 K/UL (ref 0.5–4.6)
LYMPHOCYTES NFR BLD: 32 % (ref 13–44)
MCH RBC QN AUTO: 25.3 PG (ref 26.1–32.9)
MCHC RBC AUTO-ENTMCNC: 30.5 G/DL (ref 31.4–35)
MCV RBC AUTO: 83.1 FL (ref 82–102)
MONOCYTES # BLD: 0.4 K/UL (ref 0.1–1.3)
MONOCYTES NFR BLD: 7 % (ref 4–12)
NEUTS SEG # BLD: 3.1 K/UL (ref 1.7–8.2)
NEUTS SEG NFR BLD: 56 % (ref 43–78)
NRBC # BLD: 0 K/UL (ref 0–0.2)
PLATELET # BLD AUTO: 213 K/UL (ref 150–450)
PMV BLD AUTO: 10.8 FL (ref 9.4–12.3)
POTASSIUM SERPL-SCNC: 4.3 MMOL/L (ref 3.5–5.1)
PROT SERPL-MCNC: 7.7 G/DL (ref 6.3–8.2)
RBC # BLD AUTO: 4.62 M/UL (ref 4.05–5.2)
SODIUM SERPL-SCNC: 137 MMOL/L (ref 133–143)
TRIGL SERPL-MCNC: 175 MG/DL (ref 35–150)
TSH, 3RD GENERATION: 1.74 UIU/ML (ref 0.36–3.74)
VIT B12 SERPL-MCNC: 373 PG/ML (ref 193–986)
VLDLC SERPL CALC-MCNC: 35 MG/DL (ref 6–23)
WBC # BLD AUTO: 5.5 K/UL (ref 4.3–11.1)

## 2023-05-19 NOTE — PROGRESS NOTES
[facility]  [address] [place] [id number]  Dept: [phone]      Physical Therapy      Referring MD: Crissy Polanco MD  Diagnosis:    Diagnosis Orders   1. Acute pain of right knee        2. Stiffness of right knee        3. Difficulty walking        4. Muscle weakness             Therapy precautions:None  Co-morbidities affecting plan of care: previous L LE injury with ORIF lower leg limiting knee flexion  Total Timed Procedure Codes: 45 min, Total Time: 60 min Modifier needed: No  Episode visit count:  2     PERTINENT MEDICAL HISTORY     Past medical and surgical history:   Past Medical History:   Diagnosis Date    Contact dermatitis 7/21/2021    COVID-19 12/2019    Diabetes ([address]     GERD (gastroesophageal reflux disease)     Hypercholesterolemia     Hypertension     Overactive bladder     Vitamin D deficiency       Past Surgical History:   Procedure Laterality Date    COLONOSCOPY  09/26/2019    8 mm Sessile Cecal Polyp, Moderately Tortuous Colon, Multiple Small Mouthed Diverticula in Sigmoid Colon, Small Internal Hemorrhoids    HYSTERECTOMY, TOTAL ABDOMINAL (CERVIX REMOVED)  04/01/1992    LASER RESURFACING      x 5 - Birthmark on Face    LEG SURGERY Left     possible ORIF tibia ~ 1998    UPPER GASTROINTESTINAL ENDOSCOPY  09/26/2019    1 cm Hiatal Hernia, Diffuse Mild Inflammation     Medications: reviewed in chart   Allergies: No Known Allergies     Diagnostic exams (per chart review): MRI scan of her right knee demonstrates a full-thickness radial tear of the root attachment of the posterior horn of the medial meniscus with some intrasubstance degeneration as well as extrusion of the mid body. There is some intrasubstance degenerative tearing of the lateral meniscus with some extrusion of that.   There is diffuse chondral thinning tricompartmentally and some subchondral cystic changes in the posterior aspect the medial compartment with advanced degenerative changes

## 2023-05-22 ENCOUNTER — TREATMENT (OUTPATIENT)
Age: 58
End: 2023-05-22

## 2023-05-22 DIAGNOSIS — R26.2 DIFFICULTY WALKING: ICD-10-CM

## 2023-05-22 DIAGNOSIS — M25.561 ACUTE PAIN OF RIGHT KNEE: Primary | ICD-10-CM

## 2023-05-22 DIAGNOSIS — M25.661 STIFFNESS OF RIGHT KNEE: ICD-10-CM

## 2023-05-22 DIAGNOSIS — M62.81 MUSCLE WEAKNESS: ICD-10-CM

## 2023-05-22 LAB — RPR SER QL: NONREACTIVE

## 2023-05-22 NOTE — PROGRESS NOTES
[facility] [address] [address]  Dept: [phone]      Physical Therapy      Referring MD: Marylou Carr MD  Diagnosis:    Diagnosis Orders   1. Acute pain of right knee        2. Stiffness of right knee        3. Difficulty walking        4. Muscle weakness             Therapy precautions:None  Co-morbidities affecting plan of care: previous L LE injury with ORIF lower leg limiting knee flexion  Total Timed Procedure Codes: 40 min, Total Time: 65 min Modifier needed: No  Episode visit count:  3     PERTINENT MEDICAL HISTORY     Past medical and surgical history:   Past Medical History:   Diagnosis Date    Contact dermatitis 7/21/2021    COVID-19 12/2019    Diabetes ([address]     GERD (gastroesophageal reflux disease)     Hypercholesterolemia     Hypertension     Overactive bladder     Vitamin D deficiency       Past Surgical History:   Procedure Laterality Date    COLONOSCOPY  09/26/2019    8 mm Sessile Cecal Polyp, Moderately Tortuous Colon, Multiple Small Mouthed Diverticula in Sigmoid Colon, Small Internal Hemorrhoids    HYSTERECTOMY, TOTAL ABDOMINAL (CERVIX REMOVED)  04/01/1992    LASER RESURFACING      x 5 - Birthmark on Face    LEG SURGERY Left     possible ORIF tibia ~ 1998    UPPER GASTROINTESTINAL ENDOSCOPY  09/26/2019    1 cm Hiatal Hernia, Diffuse Mild Inflammation     Medications: reviewed in chart   Allergies: No Known Allergies     Diagnostic exams (per chart review): MRI scan of her right knee demonstrates a full-thickness radial tear of the root attachment of the posterior horn of the medial meniscus with some intrasubstance degeneration as well as extrusion of the mid body. There is some intrasubstance degenerative tearing of the lateral meniscus with some extrusion of that.   There is diffuse chondral thinning tricompartmentally and some subchondral cystic changes in the posterior aspect the medial compartment with advanced degenerative changes

## 2023-05-24 ENCOUNTER — TELEPHONE (OUTPATIENT)
Age: 58
End: 2023-05-24

## 2023-05-24 NOTE — TELEPHONE ENCOUNTER
Pt cancelled < 24 hours for their scheduled therapy appointment today. Reason: illness  Communication: Pt left voicemail for therapist stating that she woke up feeling poorly. Confirmed next appt.

## 2023-05-26 ENCOUNTER — OFFICE VISIT (OUTPATIENT)
Dept: INTERNAL MEDICINE CLINIC | Facility: CLINIC | Age: 58
End: 2023-05-26
Payer: COMMERCIAL

## 2023-05-26 VITALS
SYSTOLIC BLOOD PRESSURE: 130 MMHG | TEMPERATURE: 97 F | HEIGHT: 67 IN | BODY MASS INDEX: 45.99 KG/M2 | WEIGHT: 293 LBS | OXYGEN SATURATION: 96 % | DIASTOLIC BLOOD PRESSURE: 60 MMHG | HEART RATE: 74 BPM

## 2023-05-26 DIAGNOSIS — Z12.31 SCREENING MAMMOGRAM FOR BREAST CANCER: ICD-10-CM

## 2023-05-26 DIAGNOSIS — F41.0 PANIC ATTACKS: ICD-10-CM

## 2023-05-26 DIAGNOSIS — E11.42 TYPE 2 DIABETES MELLITUS WITH DIABETIC POLYNEUROPATHY, WITHOUT LONG-TERM CURRENT USE OF INSULIN (HCC): ICD-10-CM

## 2023-05-26 DIAGNOSIS — E78.2 MIXED HYPERLIPIDEMIA: ICD-10-CM

## 2023-05-26 DIAGNOSIS — K21.9 GASTROESOPHAGEAL REFLUX DISEASE WITHOUT ESOPHAGITIS: ICD-10-CM

## 2023-05-26 DIAGNOSIS — Z00.00 ENCOUNTER FOR WELL ADULT EXAM WITHOUT ABNORMAL FINDINGS: Primary | ICD-10-CM

## 2023-05-26 DIAGNOSIS — I10 ESSENTIAL HYPERTENSION: ICD-10-CM

## 2023-05-26 DIAGNOSIS — R00.2 HEART PALPITATIONS: ICD-10-CM

## 2023-05-26 PROCEDURE — 3075F SYST BP GE 130 - 139MM HG: CPT | Performed by: NURSE PRACTITIONER

## 2023-05-26 PROCEDURE — 3078F DIAST BP <80 MM HG: CPT | Performed by: NURSE PRACTITIONER

## 2023-05-26 PROCEDURE — 99396 PREV VISIT EST AGE 40-64: CPT | Performed by: NURSE PRACTITIONER

## 2023-05-26 RX ORDER — DULOXETIN HYDROCHLORIDE 60 MG/1
CAPSULE, DELAYED RELEASE ORAL
Qty: 90 CAPSULE | Refills: 0 | Status: SHIPPED | OUTPATIENT
Start: 2023-05-26

## 2023-05-26 RX ORDER — METOPROLOL TARTRATE 50 MG/1
50 TABLET, FILM COATED ORAL 2 TIMES DAILY
Qty: 180 TABLET | Refills: 0 | Status: SHIPPED | OUTPATIENT
Start: 2023-05-26

## 2023-05-26 RX ORDER — GABAPENTIN 300 MG/1
CAPSULE ORAL
Qty: 540 CAPSULE | Refills: 0 | Status: SHIPPED | OUTPATIENT
Start: 2023-05-26 | End: 2023-06-26

## 2023-05-26 RX ORDER — B-COMPLEX WITH VITAMIN C
1 TABLET ORAL DAILY
Refills: 0 | COMMUNITY
Start: 2023-05-26

## 2023-05-26 RX ORDER — ATORVASTATIN CALCIUM 20 MG/1
20 TABLET, FILM COATED ORAL DAILY
Qty: 90 TABLET | Refills: 0 | Status: SHIPPED | OUTPATIENT
Start: 2023-05-26

## 2023-05-26 ASSESSMENT — ENCOUNTER SYMPTOMS
VOMITING: 0
WHEEZING: 0
ABDOMINAL PAIN: 0
CONSTIPATION: 0
NAUSEA: 0
SORE THROAT: 0
COUGH: 0
DIARRHEA: 0
SHORTNESS OF BREATH: 0

## 2023-05-26 ASSESSMENT — PATIENT HEALTH QUESTIONNAIRE - PHQ9
SUM OF ALL RESPONSES TO PHQ QUESTIONS 1-9: 0
2. FEELING DOWN, DEPRESSED OR HOPELESS: 0
1. LITTLE INTEREST OR PLEASURE IN DOING THINGS: 0
SUM OF ALL RESPONSES TO PHQ QUESTIONS 1-9: 0
SUM OF ALL RESPONSES TO PHQ9 QUESTIONS 1 & 2: 0

## 2023-05-26 NOTE — PROGRESS NOTES
126 05/19/2023 11:03 AM    BUN 17 05/19/2023 11:03 AM    CREATININE 0.80 05/19/2023 11:03 AM    GFRAA >60 07/18/2022 11:58 AM    LABGLOM >60 05/19/2023 11:03 AM    CALCIUM 8.9 05/19/2023 11:03 AM    BILITOT 0.4 05/19/2023 11:03 AM    ALT 36 05/19/2023 11:03 AM    AST 26 05/19/2023 11:03 AM    ALKPHOS 106 05/19/2023 11:03 AM    ALKPHOS 97 03/09/2021 10:44 AM    PROT 7.7 05/19/2023 11:03 AM    LABALBU 3.6 05/19/2023 11:03 AM    GLOB 4.1 05/19/2023 11:03 AM    ALBUMIN 0.9 05/19/2023 11:03 AM       Lab Results   Component Value Date/Time    CHOL 147 05/19/2023 11:03 AM    HDL 48 05/19/2023 11:03 AM    TRIG 175 05/19/2023 11:03 AM    LDLCALC 64 05/19/2023 11:03 AM    VLDL 18 03/09/2021 10:44 AM       Lab Results   Component Value Date/Time    LABA1C 6.6 05/19/2023 11:03 AM    LABA1C 5.7 03/09/2021 10:44 AM       Lab Results   Component Value Date/Time    TSH 2.460 03/09/2021 10:44 AM       No results found for: PSA    Lab Results   Component Value Date/Time    LABMICR 0.66 01/12/2023 04:06 PM       PHQ-9 Total Score: 0 (5/26/2023  9:18 AM)        Assessment   Plan   1. Encounter for well adult exam without abnormal findings    2. Type 2 diabetes mellitus with diabetic polyneuropathy, without long-term current use of insulin (HCC)  -     gabapentin (NEURONTIN) 300 MG capsule; TAKE 2 CAPSULES BY MOUTH 3 TIMES A DAY, Disp-540 capsule, R-0Normal  -     metFORMIN (GLUCOPHAGE) 1000 MG tablet; Take 1 tablet by mouth 2 times daily, Disp-180 tablet, R-0Normal  -     SITagliptin (JANUVIA) 50 MG tablet; Take 1 tablet by mouth daily TAKE ONE TABLET BY MOUTH DAILY, Disp-90 tablet, R-0Normal  -     AFL - [facility]  Encouraged weight loss, diabetic diet and exercise as tolerated. Continue current regimen. Check blood sugar at least every morning fasting. 3. Essential hypertension  -     metoprolol tartrate (LOPRESSOR) 50 MG tablet;  Take 1 tablet by mouth 2 times daily TAKE ONE TABLET BY MOUTH TWICE A DAY, Disp-180 tablet,

## 2023-05-30 ENCOUNTER — TREATMENT (OUTPATIENT)
Age: 58
End: 2023-05-30
Payer: COMMERCIAL

## 2023-05-30 DIAGNOSIS — R26.2 DIFFICULTY WALKING: ICD-10-CM

## 2023-05-30 DIAGNOSIS — M25.561 ACUTE PAIN OF RIGHT KNEE: Primary | ICD-10-CM

## 2023-05-30 DIAGNOSIS — M62.81 MUSCLE WEAKNESS: ICD-10-CM

## 2023-05-30 DIAGNOSIS — M25.661 STIFFNESS OF RIGHT KNEE: ICD-10-CM

## 2023-05-30 PROCEDURE — 97140 MANUAL THERAPY 1/> REGIONS: CPT | Performed by: PHYSICAL THERAPIST

## 2023-05-30 PROCEDURE — 97016 VASOPNEUMATIC DEVICE THERAPY: CPT | Performed by: PHYSICAL THERAPIST

## 2023-05-30 PROCEDURE — 97110 THERAPEUTIC EXERCISES: CPT | Performed by: PHYSICAL THERAPIST

## 2023-05-30 PROCEDURE — 20560 NDL INSJ W/O NJX 1 OR 2 MUSC: CPT | Performed by: PHYSICAL THERAPIST

## 2023-05-30 NOTE — PROGRESS NOTES
[facility] [address] [address]  Dept: [phone]      Physical Therapy Discharge     Referring MD: Sylvania Soulier, MD  Diagnosis:    Diagnosis Orders   1. Acute pain of right knee        2. Stiffness of right knee        3. Difficulty walking        4. Muscle weakness             Therapy precautions:None  Co-morbidities affecting plan of care: previous L LE injury with ORIF lower leg limiting knee flexion  Chief complaints/history of injury: Pt noted R knee discomfort with every step while walking her dog 3/25/23 and then felt a loud pop in the knee while going up the steps to the house. Knee gave way but pt did not fall. She went to urgent care in [place] with x-rays completed. Pt was prescribed prednisone but did not take it. She followed up with Dr. Akbar Bailey and had a MRI. MD recommended a trial of PT and possible injection before considering surgery. Describe current symptoms: Pain medial R knee with tightness behind the knee and posterior thigh. Pt has difficulty walking. Knee locks in the straight position. Denies popping/numbness/tingling. Total Timed Procedure Codes: 25 min, Total Time: 50 min Modifier needed: No  Episode visit count: 4    SUBJECTIVE     Pt reports that she is having a good day today. She has been performing her home program and feels that dry needling helped reduce hamstring tightness. She will be going out of town 6/1/23 for 6 weeks. OBJECTIVE     Findings 5/17/2023 5/30/2023        LEFS score 21/80 34/80    LEFS % function 26% 43%    AROM R knee extension -3 0    AROM R knee flexion 98 105      Observation:   Gait:  ambulates independently with an antalgic pattern, decreased R heel strike and push off, lacks R knee extension in midstance  but improved since evaluation with less of a knee extension deficit noted    Treatment provided today consisted:   Therapeutic exercise (49772) x 15 min to address ROM/strength deficits and to

## 2023-08-08 ENCOUNTER — APPOINTMENT (RX ONLY)
Dept: URBAN - METROPOLITAN AREA CLINIC 329 | Facility: CLINIC | Age: 58
Setting detail: DERMATOLOGY
End: 2023-08-08

## 2023-08-08 DIAGNOSIS — L29.8 OTHER PRURITUS: ICD-10-CM

## 2023-08-08 DIAGNOSIS — L29.89 OTHER PRURITUS: ICD-10-CM

## 2023-08-08 DIAGNOSIS — L82.0 INFLAMED SEBORRHEIC KERATOSIS: ICD-10-CM

## 2023-08-08 DIAGNOSIS — B86 SCABIES: ICD-10-CM

## 2023-08-08 PROBLEM — L30.9 DERMATITIS, UNSPECIFIED: Status: ACTIVE | Noted: 2023-08-08

## 2023-08-08 PROCEDURE — 99202 OFFICE O/P NEW SF 15 MIN: CPT | Mod: 25

## 2023-08-08 PROCEDURE — ? ADDITIONAL NOTES

## 2023-08-08 PROCEDURE — ? PRESCRIPTION MEDICATION MANAGEMENT

## 2023-08-08 PROCEDURE — ? PRESCRIPTION

## 2023-08-08 PROCEDURE — 17110 DESTRUCTION B9 LES UP TO 14: CPT | Mod: 59

## 2023-08-08 PROCEDURE — ? FULL BODY SKIN EXAM - DECLINED

## 2023-08-08 PROCEDURE — ? BIOPSY BY PUNCH METHOD

## 2023-08-08 PROCEDURE — ? KOH PREP

## 2023-08-08 PROCEDURE — ? COUNSELING

## 2023-08-08 PROCEDURE — ? LIQUID NITROGEN

## 2023-08-08 PROCEDURE — 11104 PUNCH BX SKIN SINGLE LESION: CPT

## 2023-08-08 RX ORDER — HYDROXYZINE HYDROCHLORIDE 25 MG/1
TABLET, FILM COATED ORAL
Qty: 30 | Refills: 2 | Status: ERX | COMMUNITY
Start: 2023-08-08

## 2023-08-08 RX ADMIN — HYDROXYZINE HYDROCHLORIDE: 25 TABLET, FILM COATED ORAL at 00:00

## 2023-08-08 ASSESSMENT — LOCATION SIMPLE DESCRIPTION DERM
LOCATION SIMPLE: LEFT BREAST
LOCATION SIMPLE: RIGHT THIGH
LOCATION SIMPLE: LEFT UPPER BACK
LOCATION SIMPLE: LOWER BACK
LOCATION SIMPLE: RIGHT UPPER BACK
LOCATION SIMPLE: LEFT FOREARM
LOCATION SIMPLE: ABDOMEN

## 2023-08-08 ASSESSMENT — LOCATION DETAILED DESCRIPTION DERM
LOCATION DETAILED: RIGHT ANTERIOR DISTAL THIGH
LOCATION DETAILED: LEFT LATERAL BREAST 1-2:00 REGION
LOCATION DETAILED: LEFT SUPERIOR UPPER BACK
LOCATION DETAILED: RIGHT SUPERIOR MEDIAL UPPER BACK
LOCATION DETAILED: LEFT VENTRAL PROXIMAL FOREARM
LOCATION DETAILED: PERIUMBILICAL SKIN
LOCATION DETAILED: LEFT SUPERIOR MEDIAL UPPER BACK
LOCATION DETAILED: SUPERIOR LUMBAR SPINE

## 2023-08-08 ASSESSMENT — LOCATION ZONE DERM
LOCATION ZONE: LEG
LOCATION ZONE: ARM
LOCATION ZONE: TRUNK

## 2023-08-08 NOTE — PROCEDURE: ADDITIONAL NOTES
Render Risk Assessment In Note?: no
Additional Notes: Advised patient take Zyrtec daily.
Detail Level: Simple

## 2023-08-08 NOTE — PROCEDURE: LIQUID NITROGEN
Post-Care Instructions: I reviewed with the patient in detail post-care instructions. Patient is to wear sunprotection, and avoid picking at any of the treated lesions. Pt may apply Vaseline to crusted or scabbing areas.
Render Post-Care Instructions In Note?: no
Spray Paint Text: The liquid nitrogen was applied to the skin utilizing a spray paint frosting technique.
Show Spray Paint Technique Variable?: Yes
Medical Necessity Clause: This procedure was medically necessary because the lesions that were treated were:
Medical Necessity Information: It is in your best interest to select a reason for this procedure from the list below. All of these items fulfill various CMS LCD requirements except the new and changing color options.
Detail Level: Detailed
Consent: The patient's consent was obtained including but not limited to risks of crusting, scabbing, blistering, scarring, darker or lighter pigmentary change, recurrence, incomplete removal and infection.

## 2023-08-08 NOTE — PROCEDURE: PRESCRIPTION MEDICATION MANAGEMENT
Detail Level: Zone
Initiate Treatment: Hydroxyzine 25mg take 1/2-1 tab nightly as needed.
Render In Strict Bullet Format?: No

## 2023-08-17 DIAGNOSIS — I10 ESSENTIAL HYPERTENSION: ICD-10-CM

## 2023-08-17 DIAGNOSIS — E78.2 MIXED HYPERLIPIDEMIA: ICD-10-CM

## 2023-08-17 DIAGNOSIS — E11.42 TYPE 2 DIABETES MELLITUS WITH DIABETIC POLYNEUROPATHY, WITHOUT LONG-TERM CURRENT USE OF INSULIN (HCC): Primary | ICD-10-CM

## 2023-08-22 ENCOUNTER — APPOINTMENT (RX ONLY)
Dept: URBAN - METROPOLITAN AREA CLINIC 329 | Facility: CLINIC | Age: 58
Setting detail: DERMATOLOGY
End: 2023-08-22

## 2023-08-22 DIAGNOSIS — Z48.02 ENCOUNTER FOR REMOVAL OF SUTURES: ICD-10-CM

## 2023-08-22 PROCEDURE — ? FULL BODY SKIN EXAM - DECLINED

## 2023-08-22 PROCEDURE — ? COUNSELING

## 2023-08-22 PROCEDURE — ? SUTURE REMOVAL

## 2023-08-22 ASSESSMENT — LOCATION DETAILED DESCRIPTION DERM: LOCATION DETAILED: LEFT SUPERIOR UPPER BACK

## 2023-08-22 ASSESSMENT — LOCATION SIMPLE DESCRIPTION DERM: LOCATION SIMPLE: LEFT UPPER BACK

## 2023-08-22 ASSESSMENT — LOCATION ZONE DERM: LOCATION ZONE: TRUNK

## 2023-08-30 DIAGNOSIS — I10 ESSENTIAL HYPERTENSION: ICD-10-CM

## 2023-08-30 DIAGNOSIS — E11.42 TYPE 2 DIABETES MELLITUS WITH DIABETIC POLYNEUROPATHY, WITHOUT LONG-TERM CURRENT USE OF INSULIN (HCC): ICD-10-CM

## 2023-08-30 DIAGNOSIS — E78.2 MIXED HYPERLIPIDEMIA: ICD-10-CM

## 2023-08-30 DIAGNOSIS — F41.0 PANIC ATTACKS: ICD-10-CM

## 2023-08-30 RX ORDER — METOPROLOL TARTRATE 50 MG/1
TABLET, FILM COATED ORAL
Qty: 60 TABLET | Refills: 5 | OUTPATIENT
Start: 2023-08-30

## 2023-08-30 RX ORDER — DULOXETIN HYDROCHLORIDE 60 MG/1
CAPSULE, DELAYED RELEASE ORAL
Qty: 30 CAPSULE | Refills: 8 | OUTPATIENT
Start: 2023-08-30

## 2023-08-30 RX ORDER — ATORVASTATIN CALCIUM 20 MG/1
TABLET, FILM COATED ORAL
Qty: 30 TABLET | Refills: 8 | OUTPATIENT
Start: 2023-08-30

## 2023-09-05 DIAGNOSIS — E78.2 MIXED HYPERLIPIDEMIA: ICD-10-CM

## 2023-09-05 DIAGNOSIS — I10 ESSENTIAL HYPERTENSION: ICD-10-CM

## 2023-09-05 DIAGNOSIS — E11.42 TYPE 2 DIABETES MELLITUS WITH DIABETIC POLYNEUROPATHY, WITHOUT LONG-TERM CURRENT USE OF INSULIN (HCC): ICD-10-CM

## 2023-09-05 LAB
BASOPHILS # BLD: 0 K/UL (ref 0–0.2)
BASOPHILS NFR BLD: 1 % (ref 0–2)
DIFFERENTIAL METHOD BLD: ABNORMAL
EOSINOPHIL # BLD: 0.3 K/UL (ref 0–0.8)
EOSINOPHIL NFR BLD: 4 % (ref 0.5–7.8)
ERYTHROCYTE [DISTWIDTH] IN BLOOD BY AUTOMATED COUNT: 15.5 % (ref 11.9–14.6)
HCT VFR BLD AUTO: 41 % (ref 35.8–46.3)
HGB BLD-MCNC: 12.2 G/DL (ref 11.7–15.4)
IMM GRANULOCYTES # BLD AUTO: 0 K/UL (ref 0–0.5)
IMM GRANULOCYTES NFR BLD AUTO: 0 % (ref 0–5)
LYMPHOCYTES # BLD: 1.9 K/UL (ref 0.5–4.6)
LYMPHOCYTES NFR BLD: 28 % (ref 13–44)
MCH RBC QN AUTO: 25.4 PG (ref 26.1–32.9)
MCHC RBC AUTO-ENTMCNC: 29.8 G/DL (ref 31.4–35)
MCV RBC AUTO: 85.2 FL (ref 82–102)
MONOCYTES # BLD: 0.5 K/UL (ref 0.1–1.3)
MONOCYTES NFR BLD: 7 % (ref 4–12)
NEUTS SEG # BLD: 3.9 K/UL (ref 1.7–8.2)
NEUTS SEG NFR BLD: 59 % (ref 43–78)
NRBC # BLD: 0 K/UL (ref 0–0.2)
PLATELET # BLD AUTO: 210 K/UL (ref 150–450)
PMV BLD AUTO: 10.8 FL (ref 9.4–12.3)
RBC # BLD AUTO: 4.81 M/UL (ref 4.05–5.2)
WBC # BLD AUTO: 6.6 K/UL (ref 4.3–11.1)

## 2023-09-06 LAB
ALBUMIN SERPL-MCNC: 3.7 G/DL (ref 3.2–4.6)
ALBUMIN/GLOB SERPL: 0.9 (ref 0.4–1.6)
ALP SERPL-CCNC: 111 U/L (ref 50–136)
ALT SERPL-CCNC: 46 U/L (ref 12–65)
ANION GAP SERPL CALC-SCNC: 6 MMOL/L (ref 2–11)
AST SERPL-CCNC: 43 U/L (ref 15–37)
BILIRUB SERPL-MCNC: 0.4 MG/DL (ref 0.2–1.1)
BUN SERPL-MCNC: 18 MG/DL (ref 8–23)
CALCIUM SERPL-MCNC: 9.6 MG/DL (ref 8.3–10.4)
CHLORIDE SERPL-SCNC: 105 MMOL/L (ref 101–110)
CHOLEST SERPL-MCNC: 194 MG/DL
CO2 SERPL-SCNC: 27 MMOL/L (ref 21–32)
CREAT SERPL-MCNC: 0.9 MG/DL (ref 0.6–1)
EST. AVERAGE GLUCOSE BLD GHB EST-MCNC: 143 MG/DL
GLOBULIN SER CALC-MCNC: 4.2 G/DL (ref 2.8–4.5)
GLUCOSE SERPL-MCNC: 157 MG/DL (ref 65–100)
HBA1C MFR BLD: 6.6 % (ref 4.8–5.6)
HDLC SERPL-MCNC: 48 MG/DL (ref 40–60)
HDLC SERPL: 4
LDLC SERPL CALC-MCNC: 107.2 MG/DL
POTASSIUM SERPL-SCNC: 4.6 MMOL/L (ref 3.5–5.1)
PROT SERPL-MCNC: 7.9 G/DL (ref 6.3–8.2)
SODIUM SERPL-SCNC: 138 MMOL/L (ref 133–143)
TRIGL SERPL-MCNC: 194 MG/DL (ref 35–150)
VLDLC SERPL CALC-MCNC: 38.8 MG/DL (ref 6–23)

## 2023-09-06 ASSESSMENT — PATIENT HEALTH QUESTIONNAIRE - PHQ9
1. LITTLE INTEREST OR PLEASURE IN DOING THINGS: 1
SUM OF ALL RESPONSES TO PHQ QUESTIONS 1-9: 2
SUM OF ALL RESPONSES TO PHQ QUESTIONS 1-9: 2
2. FEELING DOWN, DEPRESSED OR HOPELESS: SEVERAL DAYS
SUM OF ALL RESPONSES TO PHQ QUESTIONS 1-9: 2
1. LITTLE INTEREST OR PLEASURE IN DOING THINGS: SEVERAL DAYS
SUM OF ALL RESPONSES TO PHQ9 QUESTIONS 1 & 2: 2
SUM OF ALL RESPONSES TO PHQ QUESTIONS 1-9: 2
SUM OF ALL RESPONSES TO PHQ9 QUESTIONS 1 & 2: 2
2. FEELING DOWN, DEPRESSED OR HOPELESS: 1

## 2023-09-06 ASSESSMENT — ENCOUNTER SYMPTOMS: SHORTNESS OF BREATH: 0

## 2023-09-06 NOTE — PROGRESS NOTES
normal.      Pupils: Pupils are equal, round, and reactive to light. Neck:      Vascular: No carotid bruit. Cardiovascular:      Rate and Rhythm: Normal rate and regular rhythm. Heart sounds: Normal heart sounds. Pulmonary:      Effort: Pulmonary effort is normal.      Breath sounds: Normal breath sounds. Musculoskeletal:      Right shoulder: No tenderness. Decreased range of motion. Left shoulder: No tenderness. Decreased range of motion. Cervical back: Pain with movement (R sided) and muscular tenderness present. Decreased range of motion. Skin:     General: Skin is warm and dry. Neurological:      Mental Status: She is alert and oriented to person, place, and time. Psychiatric:         Mood and Affect: Mood normal.         Behavior: Behavior normal.         Thought Content: Thought content normal.         Judgment: Judgment normal.          On this date 9/7/2023 I have spent 50 minutes reviewing previous notes, test results and face to face with the patient discussing the diagnosis and importance of compliance with the treatment plan as well as documenting on the day of the visit. An electronic signature was used to authenticate this note.     --Dominga Acevedo PA-C

## 2023-09-07 ENCOUNTER — PATIENT MESSAGE (OUTPATIENT)
Dept: INTERNAL MEDICINE CLINIC | Facility: CLINIC | Age: 58
End: 2023-09-07

## 2023-09-07 ENCOUNTER — OFFICE VISIT (OUTPATIENT)
Dept: INTERNAL MEDICINE CLINIC | Facility: CLINIC | Age: 58
End: 2023-09-07
Payer: COMMERCIAL

## 2023-09-07 VITALS
BODY MASS INDEX: 45.99 KG/M2 | DIASTOLIC BLOOD PRESSURE: 90 MMHG | SYSTOLIC BLOOD PRESSURE: 148 MMHG | HEIGHT: 67 IN | OXYGEN SATURATION: 97 % | TEMPERATURE: 97.3 F | WEIGHT: 293 LBS | HEART RATE: 81 BPM

## 2023-09-07 DIAGNOSIS — R20.0 RIGHT UPPER EXTREMITY NUMBNESS: ICD-10-CM

## 2023-09-07 DIAGNOSIS — E11.42 TYPE 2 DIABETES MELLITUS WITH DIABETIC POLYNEUROPATHY, WITHOUT LONG-TERM CURRENT USE OF INSULIN (HCC): Primary | ICD-10-CM

## 2023-09-07 DIAGNOSIS — K76.0 FATTY LIVER DISEASE, NONALCOHOLIC: ICD-10-CM

## 2023-09-07 DIAGNOSIS — L11.1 GROVER'S DISEASE: Primary | ICD-10-CM

## 2023-09-07 DIAGNOSIS — E66.01 MORBID OBESITY WITH BMI OF 50.0-59.9, ADULT (HCC): ICD-10-CM

## 2023-09-07 DIAGNOSIS — I10 ESSENTIAL HYPERTENSION: ICD-10-CM

## 2023-09-07 DIAGNOSIS — S16.1XXA CERVICAL STRAIN, ACUTE, INITIAL ENCOUNTER: ICD-10-CM

## 2023-09-07 DIAGNOSIS — L11.1 GROVER'S DISEASE: ICD-10-CM

## 2023-09-07 DIAGNOSIS — E53.8 LOW VITAMIN B12 LEVEL: ICD-10-CM

## 2023-09-07 DIAGNOSIS — R74.01 ELEVATED AST (SGOT): ICD-10-CM

## 2023-09-07 DIAGNOSIS — E78.2 MIXED HYPERLIPIDEMIA: ICD-10-CM

## 2023-09-07 DIAGNOSIS — F41.0 PANIC ATTACKS: ICD-10-CM

## 2023-09-07 PROBLEM — M25.50 POLYARTHRALGIA: Status: ACTIVE | Noted: 2023-09-07

## 2023-09-07 PROCEDURE — 3080F DIAST BP >= 90 MM HG: CPT | Performed by: PHYSICIAN ASSISTANT

## 2023-09-07 PROCEDURE — 3044F HG A1C LEVEL LT 7.0%: CPT | Performed by: PHYSICIAN ASSISTANT

## 2023-09-07 PROCEDURE — 99215 OFFICE O/P EST HI 40 MIN: CPT | Performed by: PHYSICIAN ASSISTANT

## 2023-09-07 PROCEDURE — 3075F SYST BP GE 130 - 139MM HG: CPT | Performed by: PHYSICIAN ASSISTANT

## 2023-09-07 RX ORDER — METHOCARBAMOL 750 MG/1
TABLET, FILM COATED ORAL
Qty: 40 TABLET | Refills: 0 | Status: SHIPPED | OUTPATIENT
Start: 2023-09-07

## 2023-09-07 RX ORDER — METOPROLOL TARTRATE 50 MG/1
50 TABLET, FILM COATED ORAL 2 TIMES DAILY
Qty: 180 TABLET | Refills: 3 | Status: SHIPPED | OUTPATIENT
Start: 2023-09-07

## 2023-09-07 RX ORDER — ATORVASTATIN CALCIUM 20 MG/1
20 TABLET, FILM COATED ORAL NIGHTLY
Qty: 90 TABLET | Refills: 3 | Status: SHIPPED | OUTPATIENT
Start: 2023-09-07

## 2023-09-07 RX ORDER — GABAPENTIN 300 MG/1
600 CAPSULE ORAL 2 TIMES DAILY
Qty: 360 CAPSULE | Refills: 3 | Status: SHIPPED | OUTPATIENT
Start: 2023-09-07 | End: 2024-09-01

## 2023-09-07 RX ORDER — DULOXETIN HYDROCHLORIDE 60 MG/1
60 CAPSULE, DELAYED RELEASE ORAL DAILY
Qty: 90 CAPSULE | Refills: 3 | Status: SHIPPED | OUTPATIENT
Start: 2023-09-07

## 2023-09-07 RX ORDER — TRIAMCINOLONE ACETONIDE 1 MG/G
CREAM TOPICAL
COMMUNITY
Start: 2023-07-12

## 2023-09-07 RX ORDER — OMEPRAZOLE 20 MG/1
20 CAPSULE, DELAYED RELEASE ORAL DAILY
Qty: 90 CAPSULE | Refills: 3 | Status: CANCELLED | OUTPATIENT
Start: 2023-09-07

## 2023-09-07 NOTE — PATIENT INSTRUCTIONS
Recommend moving Lipitor to PM dosing for best results  Trial on Robaxin 750 mg 2 times/day x 2 weeks and if R arm numbness resolves she can consider canceling nerve conduction study since it could be muscular due to cervical strain likely caused by change in sleeping situation over the past few months   Reminded she is overdue for annual diabetic eye exam and should schedule with [place] ASAP - phone number provided to patient  Discussed option to utilize Ozempic in place of Januvia but she doesn't want to start something new before upcoming trips  Suggest showing with Hibiclens twice weekly to help reduce risk of secondary infection from scratching rash associated with [place]'s disease  Monitor blood pressure 2-3 times/week and keep record to bring to next appointment  Continue chronic medications as prescribed  Monitor blood sugar daily and keep record to bring to next appointment  Recommend focusing on eating at home mostly where she can control the amount of fat & sodium that food is being cooked with

## 2023-09-08 ENCOUNTER — TELEPHONE (OUTPATIENT)
Dept: INTERNAL MEDICINE CLINIC | Facility: CLINIC | Age: 58
End: 2023-09-08

## 2023-09-08 NOTE — TELEPHONE ENCOUNTER
----- Message from Caron Gottlieb PA-C sent at 9/7/2023  6:49 PM EDT -----  Can you please call her and see if she's taking the B-Complex vitamin we recommended when her B12 levels came back kind of low in May? I forgot to ask her today.

## 2023-09-08 NOTE — TELEPHONE ENCOUNTER
From: Kelsie Haywood  To: Diamond Soto  Sent: 9/7/2023 7:05 PM EDT  Subject: Beba Sable Disease    Hi Miss Alexandrea Ayala. .... I have been reading that the medication \"Rinvoq\" works miracles to cure my problem with [place] Disease. .... Can you please call in a prescription so I can try this out ?   Thanks - Karan Vee

## 2023-09-08 NOTE — TELEPHONE ENCOUNTER
LUIS on 9/8/23 @ 1026 and sent pt a [employer] message asking that she return my call or respond to my message to let me know if she is taking a B-complex vitamin.

## 2023-09-12 ENCOUNTER — OFFICE VISIT (OUTPATIENT)
Dept: ORTHOPEDIC SURGERY | Age: 58
End: 2023-09-12

## 2023-09-12 DIAGNOSIS — M17.11 ARTHRITIS OF RIGHT KNEE: ICD-10-CM

## 2023-09-12 DIAGNOSIS — M25.571 RIGHT ANKLE PAIN, UNSPECIFIED CHRONICITY: Primary | ICD-10-CM

## 2023-09-12 DIAGNOSIS — S83.241A ACUTE MEDIAL MENISCUS TEAR OF RIGHT KNEE, INITIAL ENCOUNTER: ICD-10-CM

## 2023-09-12 RX ORDER — DICLOFENAC SODIUM 75 MG/1
75 TABLET, DELAYED RELEASE ORAL 2 TIMES DAILY
Qty: 60 TABLET | Refills: 0 | Status: SHIPPED | OUTPATIENT
Start: 2023-09-12 | End: 2023-10-12

## 2023-09-12 NOTE — PROGRESS NOTES
Reddie Hinge brace for patients right knee. I explained how the hinge on each side of the brace should line up with the patella. The patient was also instructed to tighten the strap distal to the patella first to insure the brace is anchored and prevents slipping, , then the top strap should be tightened. Patient read and signed documenting they understand and agree to [facility]'s current DME return policy.

## 2023-09-12 NOTE — PROGRESS NOTES
Name: Alize Ramon  YOB: 1965  Gender: female  MRN: [id number]      CC: Knee Pain (B)       HPI: Alize Ramon is a 59 y.o. female who returns for follow up on B knees. She reports she was not able to attend PT due to personal reasons. Her main complaint is medial knee pain on the R side. She also reports she has recently injured her R ankle, and would like to be referred to a foot and ankle specialist.         Physical Examination:  General: no acute distress  Lungs: breathing easily  CV: regular rhythm by pulse  Right Knee: Tenderness palpation over the medial joint line pain and crepitus at the extremes of motion pain with Meño's of medial joint line. Ligamentously stable x4. Left knee is diffusely painful but mild. Assessment:     ICD-10-CM    1. Right ankle pain, unspecified chronicity  M25.571 [facility] - Jerodmary grace Hawthorne MD, Orthopedic Surgery, [address] [facility]      2. Arthritis of right knee  M17.11 Ambulatory Referral to [facility]     Ambulatory referral to Physical Therapy      3. Acute medial meniscus tear of right knee, initial encounter  S83.241A Ambulatory Referral to [facility]     Ambulatory referral to Physical Therapy          Plan:   She did well on her left knee from physical therapy we talked about intra-articular injection on her right knee versus physical therapy versus knee arthroscopy. I do not think she is a great candidate for knee arthroscopy. She wants hold off on the injection at this point she was referred to physical therapy for her right knee. I can see her back for an injection if she desires in the future. We did place a referral to foot and ankle for her chronic ankle and foot pain. Radha Georges MD, [address] [facility]

## 2023-10-02 ENCOUNTER — OFFICE VISIT (OUTPATIENT)
Dept: ORTHOPEDIC SURGERY | Age: 58
End: 2023-10-02

## 2023-10-02 DIAGNOSIS — M19.071 PRIMARY OSTEOARTHRITIS OF RIGHT FOOT: ICD-10-CM

## 2023-10-02 DIAGNOSIS — M25.571 RIGHT ANKLE PAIN, UNSPECIFIED CHRONICITY: Primary | ICD-10-CM

## 2023-10-02 RX ORDER — HYDROXYZINE HYDROCHLORIDE 25 MG/1
TABLET, FILM COATED ORAL
COMMUNITY
Start: 2023-09-07

## 2023-10-02 RX ORDER — OXYBUTYNIN CHLORIDE 5 MG/1
15 TABLET, EXTENDED RELEASE ORAL DAILY
COMMUNITY
Start: 2020-10-21

## 2023-10-02 NOTE — PROGRESS NOTES
The patient was prescribed a Wraptor brace for the patient's rightfoot. The patient wears a size NA shoe and I fitted the patient with a XXL brace. I explained how to fit the brace properly by pulling the lace tabs across top of foot first then under arch and lastly pulling the strap up firmly and attaching to the lateral Velcro strip. Thus forming a figure 8 across the ankle joint. Once the figure 8 is completed they are to secure the top (short circumferential) straps to help avoid the straps from loosening with normal wear. The patient was able to demonstrate proper fitting in office to ensure compliance with device and acknowledged satisfaction with current fit. The patient was also prescribed and fitted with a boot sleeve for the right foot, size large. Patient read and signed documenting they understand and agree to [facility]'s current DME return policy.

## 2023-10-02 NOTE — PROGRESS NOTES
Name: Liam Matthews  YOB: 1965  Gender: female  MRN: [id number]    CC: Right ankle pain    HPI:   10/02/2023: Initial visit: Right chronic ankle pain: Several year history: No trauma:    ROS/Meds/PSH/PMH/FH/SH: reviewed today    Tobacco:  reports that she quit smoking about 10 years ago. Her smoking use included cigarettes. She has a 35.00 pack-year smoking history. She has never used smokeless tobacco.   Type 2 diabetes: Polyarthralgia: Venous stasis dermatitis: Varicose veins    Physical Examination:  Patient appears to be alert and oriented with acceptable appearance. No obvious distress or SOB  CV: appears to have acceptable vascular color and capillary refill; significant varicose veins and venous stasis hyperpigmentation  Neuro: appears to have mostly intact light touch sensation   Skin: Venous stasis and hyper pigmentation; varicose veins; ankle/hindfoot thickening  MS: Standing: Pes planovalgus: Gait full  Right = limited hindfoot motion sinus Tarsi/lateral hindfoot pain  Right = no peroneal spasticity  Right = appears to have 5/5 strength but hard to fully assess with limited hindfoot motion    XR: Right: Standing AP lateral mortise ankle plus AP oblique foot taken today with hindfoot arthritis; calcaneonavicular coalition: Moderate degree of midfoot arthritis; plantar and posterior heel enthesopathy  XR Impression:  As above      Reviewed Test/Records/Documents:   09/12/2023: Dr. Amarilys Steele: Reflects bilateral knee concerns as well as right ankle pain     Injection: Offered as an option    Assessment:    Right hindfoot arthritis, calcaneonavicular coalition    Plan:   The patient and I discussed the above assessment. We explored treatment options.      We discussed her long-term concerns related to calcaneonavicular coalition hindfoot arthritis   She has hindfoot motion limitations but no true spasticity  She understands the importance of ankle/hindfoot un-level surface protection   She

## 2023-10-03 ENCOUNTER — APPOINTMENT (RX ONLY)
Dept: URBAN - METROPOLITAN AREA CLINIC 329 | Facility: CLINIC | Age: 58
Setting detail: DERMATOLOGY
End: 2023-10-03

## 2023-10-03 DIAGNOSIS — L20.89 OTHER ATOPIC DERMATITIS: ICD-10-CM | Status: INADEQUATELY CONTROLLED

## 2023-10-03 DIAGNOSIS — L11.1 TRANSIENT ACANTHOLYTIC DERMATOSIS [GROVER]: ICD-10-CM | Status: INADEQUATELY CONTROLLED

## 2023-10-03 PROCEDURE — ? ADDITIONAL NOTES

## 2023-10-03 PROCEDURE — ? PRESCRIPTION

## 2023-10-03 PROCEDURE — ? COUNSELING

## 2023-10-03 PROCEDURE — ? FULL BODY SKIN EXAM - DECLINED

## 2023-10-03 PROCEDURE — 99214 OFFICE O/P EST MOD 30 MIN: CPT

## 2023-10-03 PROCEDURE — ? PRESCRIPTION MEDICATION MANAGEMENT

## 2023-10-03 RX ORDER — TACROLIMUS 1 MG/G
OINTMENT TOPICAL
Qty: 100 | Refills: 2 | Status: ERX | COMMUNITY
Start: 2023-10-03

## 2023-10-03 RX ADMIN — TACROLIMUS: 1 OINTMENT TOPICAL at 00:00

## 2023-10-03 ASSESSMENT — ITCH NUMERIC RATING SCALE: HOW SEVERE IS YOUR ITCHING?: 8

## 2023-10-03 ASSESSMENT — LOCATION SIMPLE DESCRIPTION DERM
LOCATION SIMPLE: LEFT UPPER ARM
LOCATION SIMPLE: LEFT BUTTOCK
LOCATION SIMPLE: RIGHT POSTERIOR UPPER ARM
LOCATION SIMPLE: LEFT POSTERIOR UPPER ARM
LOCATION SIMPLE: RIGHT UPPER BACK
LOCATION SIMPLE: RIGHT UPPER ARM

## 2023-10-03 ASSESSMENT — LOCATION DETAILED DESCRIPTION DERM
LOCATION DETAILED: RIGHT PROXIMAL POSTERIOR UPPER ARM
LOCATION DETAILED: RIGHT ANTERIOR DISTAL UPPER ARM
LOCATION DETAILED: LEFT BUTTOCK
LOCATION DETAILED: LEFT ANTERIOR DISTAL UPPER ARM
LOCATION DETAILED: RIGHT SUPERIOR UPPER BACK
LOCATION DETAILED: LEFT PROXIMAL POSTERIOR UPPER ARM

## 2023-10-03 ASSESSMENT — LOCATION ZONE DERM
LOCATION ZONE: ARM
LOCATION ZONE: TRUNK

## 2023-10-03 ASSESSMENT — SEVERITY ASSESSMENT 2020: SEVERITY 2020: MODERATE

## 2023-10-03 ASSESSMENT — BSA ECZEMA: % BODY COVERED IN ECZEMA: 40

## 2023-10-03 NOTE — PROCEDURE: PRESCRIPTION MEDICATION MANAGEMENT
Render In Strict Bullet Format?: No
Samples Given: Cerave Itch Relief moisturizing cream
Initiate Treatment: Triamcinolone cream AAA bid for 2 weeks. Then prn for flares.
Detail Level: Zone
Initiate Treatment: tacrolimus 0.1 % topical ointment \\nApply to affected areas twice daily for 6 weeks

## 2023-10-03 NOTE — PROCEDURE: ADDITIONAL NOTES
Render Risk Assessment In Note?: no
Additional Notes: Recommended patient take Zyrtec daily.
Detail Level: Simple
Additional Notes: BSA 40%\\nIGA 4

## 2023-10-11 RX ORDER — DICLOFENAC SODIUM 75 MG/1
75 TABLET, DELAYED RELEASE ORAL 2 TIMES DAILY
Qty: 60 TABLET | Refills: 0 | OUTPATIENT
Start: 2023-10-11

## 2023-10-18 ENCOUNTER — PROCEDURE VISIT (OUTPATIENT)
Dept: PHYSICAL MEDICINE AND REHAB | Age: 58
End: 2023-10-18
Payer: COMMERCIAL

## 2023-10-18 VITALS
BODY MASS INDEX: 45.99 KG/M2 | HEART RATE: 76 BPM | HEIGHT: 67 IN | DIASTOLIC BLOOD PRESSURE: 85 MMHG | WEIGHT: 293 LBS | SYSTOLIC BLOOD PRESSURE: 143 MMHG

## 2023-10-18 DIAGNOSIS — G56.01 RIGHT CARPAL TUNNEL SYNDROME: Primary | ICD-10-CM

## 2023-10-18 PROCEDURE — 95910 NRV CNDJ TEST 7-8 STUDIES: CPT | Performed by: PHYSICAL MEDICINE & REHABILITATION

## 2023-10-18 RX ORDER — TACROLIMUS 1 MG/G
OINTMENT TOPICAL
COMMUNITY
Start: 2023-10-03

## 2023-10-19 ENCOUNTER — TELEPHONE (OUTPATIENT)
Dept: INTERNAL MEDICINE CLINIC | Facility: CLINIC | Age: 58
End: 2023-10-19

## 2023-10-19 DIAGNOSIS — G56.01 CARPAL TUNNEL SYNDROME ON RIGHT: Primary | ICD-10-CM

## 2023-10-19 NOTE — TELEPHONE ENCOUNTER
----- Message from Billy Harrell PA-C sent at 10/19/2023  1:15 PM EDT -----  Please notify patient that her nerve conduction study identified severe carpal tunnel syndrome on the R side as the cause of her numbness. Would she like me to refer to a hand specialist so she can consider carpal tunnel surgery?   Normally, I would start with conservative therapy like bracing, etc but with the severity of her symptoms and that being confirmed with testing I think waiting longer might risk permanent decreased sensation for that arm.  ----- Message -----  From: Mishel Akers  Sent: 10/19/2023  12:23 PM EDT  To: Billy Harrell PA-C

## 2023-10-19 NOTE — TELEPHONE ENCOUNTER
Problem: MOBILITY - ADULT  Goal: Maintain or return to baseline ADL function  Description: INTERVENTIONS:  -  Assess patient's ability to carry out ADLs; assess patient's baseline for ADL function and identify physical deficits which impact ability to perform ADLs (bathing, care of mouth/teeth, toileting, grooming, dressing, etc.)  - Assess/evaluate cause of self-care deficits   - Assess range of motion  - Assess patient's mobility; develop plan if impaired  - Assess patient's need for assistive devices and provide as appropriate  - Encourage maximum independence but intervene and supervise when necessary  - Involve family in performance of ADLs  - Assess for home care needs following discharge   - Consider OT consult to assist with ADL evaluation and planning for discharge  - Provide patient education as appropriate  Outcome: Progressing  Goal: Maintains/Returns to pre admission functional level  Description: INTERVENTIONS:  - Perform BMAT or MOVE assessment daily.   - Set and communicate daily mobility goal to care team and patient/family/caregiver. - Collaborate with rehabilitation services on mobility goals if consulted  - Out of bed for toileting  - Record patient progress and toleration of activity level   Outcome: Progressing     Problem: Nutrition/Hydration-ADULT  Goal: Nutrient/Hydration intake appropriate for improving, restoring or maintaining nutritional needs  Description: Monitor and assess patient's nutrition/hydration status for malnutrition. Collaborate with interdisciplinary team and initiate plan and interventions as ordered. Monitor patient's weight and dietary intake as ordered or per policy. Utilize nutrition screening tool and intervene as necessary. Determine patient's food preferences and provide high-protein, high-caloric foods as appropriate.      INTERVENTIONS:  - Monitor oral intake, urinary output, labs, and treatment plans  - Assess nutrition and hydration status and recommend Pt notified of NCS results and verbalized understanding. Pt would like to proceed with hand specialist referral, please. course of action  - Evaluate amount of meals eaten  - Assist patient with eating if necessary   - Allow adequate time for meals  - Recommend/ encourage appropriate diets, oral nutritional supplements, and vitamin/mineral supplements  - Order, calculate, and assess calorie counts as needed  - Recommend, monitor, and adjust tube feedings and TPN/PPN based on assessed needs  - Assess need for intravenous fluids  - Provide specific nutrition/hydration education as appropriate  - Include patient/family/caregiver in decisions related to nutrition  Outcome: Progressing     Problem: Prexisting or High Potential for Compromised Skin Integrity  Goal: Skin integrity is maintained or improved  Description: INTERVENTIONS:  - Identify patients at risk for skin breakdown  - Assess and monitor skin integrity  - Assess and monitor nutrition and hydration status  - Monitor labs   - Assess for incontinence   - Turn and reposition patient  - Assist with mobility/ambulation  - Relieve pressure over bony prominences  - Avoid friction and shearing  - Provide appropriate hygiene as needed including keeping skin clean and dry  - Evaluate need for skin moisturizer/barrier cream  - Collaborate with interdisciplinary team   - Patient/family teaching  - Consider wound care consult   Outcome: Progressing

## 2023-12-13 DIAGNOSIS — R79.89 LOW VITAMIN B12 LEVEL: ICD-10-CM

## 2023-12-13 DIAGNOSIS — E11.42 TYPE 2 DIABETES MELLITUS WITH DIABETIC POLYNEUROPATHY, WITHOUT LONG-TERM CURRENT USE OF INSULIN (HCC): ICD-10-CM

## 2023-12-13 DIAGNOSIS — K76.0 FATTY LIVER DISEASE, NONALCOHOLIC: ICD-10-CM

## 2023-12-13 DIAGNOSIS — R74.01 ELEVATED AST (SGOT): ICD-10-CM

## 2023-12-13 DIAGNOSIS — E78.2 MIXED HYPERLIPIDEMIA: ICD-10-CM

## 2023-12-13 DIAGNOSIS — I10 ESSENTIAL HYPERTENSION: ICD-10-CM

## 2023-12-13 LAB
ALBUMIN SERPL-MCNC: 3.4 G/DL (ref 3.2–4.6)
ALBUMIN/GLOB SERPL: 0.8 (ref 0.4–1.6)
ALP SERPL-CCNC: 106 U/L (ref 50–136)
ALT SERPL-CCNC: 54 U/L (ref 12–65)
ANION GAP SERPL CALC-SCNC: 5 MMOL/L (ref 2–11)
AST SERPL-CCNC: 38 U/L (ref 15–37)
BASOPHILS # BLD: 0.1 K/UL (ref 0–0.2)
BASOPHILS NFR BLD: 1 % (ref 0–2)
BILIRUB SERPL-MCNC: 0.3 MG/DL (ref 0.2–1.1)
BUN SERPL-MCNC: 13 MG/DL (ref 8–23)
CALCIUM SERPL-MCNC: 9.4 MG/DL (ref 8.3–10.4)
CHLORIDE SERPL-SCNC: 106 MMOL/L (ref 103–113)
CHOLEST SERPL-MCNC: 192 MG/DL
CO2 SERPL-SCNC: 29 MMOL/L (ref 21–32)
CREAT SERPL-MCNC: 0.8 MG/DL (ref 0.6–1)
DIFFERENTIAL METHOD BLD: ABNORMAL
EOSINOPHIL # BLD: 0.6 K/UL (ref 0–0.8)
EOSINOPHIL NFR BLD: 8 % (ref 0.5–7.8)
ERYTHROCYTE [DISTWIDTH] IN BLOOD BY AUTOMATED COUNT: 15.1 % (ref 11.9–14.6)
EST. AVERAGE GLUCOSE BLD GHB EST-MCNC: 143 MG/DL
GLOBULIN SER CALC-MCNC: 4.4 G/DL (ref 2.8–4.5)
GLUCOSE SERPL-MCNC: 129 MG/DL (ref 65–100)
HBA1C MFR BLD: 6.6 % (ref 4.8–5.6)
HCT VFR BLD AUTO: 42.3 % (ref 35.8–46.3)
HDLC SERPL-MCNC: 44 MG/DL (ref 40–60)
HDLC SERPL: 4.4
HGB BLD-MCNC: 12.8 G/DL (ref 11.7–15.4)
IMM GRANULOCYTES # BLD AUTO: 0 K/UL (ref 0–0.5)
IMM GRANULOCYTES NFR BLD AUTO: 1 % (ref 0–5)
LDLC SERPL CALC-MCNC: 108 MG/DL
LYMPHOCYTES # BLD: 2.1 K/UL (ref 0.5–4.6)
LYMPHOCYTES NFR BLD: 29 % (ref 13–44)
MCH RBC QN AUTO: 25.3 PG (ref 26.1–32.9)
MCHC RBC AUTO-ENTMCNC: 30.3 G/DL (ref 31.4–35)
MCV RBC AUTO: 83.8 FL (ref 82–102)
MONOCYTES # BLD: 0.6 K/UL (ref 0.1–1.3)
MONOCYTES NFR BLD: 8 % (ref 4–12)
NEUTS SEG # BLD: 3.9 K/UL (ref 1.7–8.2)
NEUTS SEG NFR BLD: 54 % (ref 43–78)
NRBC # BLD: 0 K/UL (ref 0–0.2)
PLATELET # BLD AUTO: 220 K/UL (ref 150–450)
PMV BLD AUTO: 10.7 FL (ref 9.4–12.3)
POTASSIUM SERPL-SCNC: 4.2 MMOL/L (ref 3.5–5.1)
PROT SERPL-MCNC: 7.8 G/DL (ref 6.3–8.2)
RBC # BLD AUTO: 5.05 M/UL (ref 4.05–5.2)
SODIUM SERPL-SCNC: 140 MMOL/L (ref 136–146)
TRIGL SERPL-MCNC: 200 MG/DL (ref 35–150)
VIT B12 SERPL-MCNC: 513 PG/ML (ref 193–986)
VLDLC SERPL CALC-MCNC: 40 MG/DL (ref 6–23)
WBC # BLD AUTO: 7.2 K/UL (ref 4.3–11.1)

## 2023-12-14 ENCOUNTER — OFFICE VISIT (OUTPATIENT)
Dept: INTERNAL MEDICINE CLINIC | Facility: CLINIC | Age: 58
End: 2023-12-14
Payer: COMMERCIAL

## 2023-12-14 VITALS
HEIGHT: 67 IN | SYSTOLIC BLOOD PRESSURE: 138 MMHG | OXYGEN SATURATION: 95 % | DIASTOLIC BLOOD PRESSURE: 90 MMHG | TEMPERATURE: 97.3 F | WEIGHT: 293 LBS | HEART RATE: 84 BPM | BODY MASS INDEX: 45.99 KG/M2

## 2023-12-14 DIAGNOSIS — R74.01 ELEVATED AST (SGOT): ICD-10-CM

## 2023-12-14 DIAGNOSIS — E78.2 MIXED HYPERLIPIDEMIA: ICD-10-CM

## 2023-12-14 DIAGNOSIS — E11.42 TYPE 2 DIABETES MELLITUS WITH DIABETIC POLYNEUROPATHY, WITHOUT LONG-TERM CURRENT USE OF INSULIN (HCC): Primary | ICD-10-CM

## 2023-12-14 DIAGNOSIS — R05.8 PRODUCTIVE COUGH: ICD-10-CM

## 2023-12-14 DIAGNOSIS — J06.9 ACUTE URI OF MULTIPLE SITES: ICD-10-CM

## 2023-12-14 DIAGNOSIS — R50.9 FEVER, UNSPECIFIED FEVER CAUSE: ICD-10-CM

## 2023-12-14 DIAGNOSIS — R53.83 FATIGUE, UNSPECIFIED TYPE: ICD-10-CM

## 2023-12-14 DIAGNOSIS — I10 ESSENTIAL HYPERTENSION: ICD-10-CM

## 2023-12-14 DIAGNOSIS — R79.89 LOW VITAMIN B12 LEVEL: ICD-10-CM

## 2023-12-14 LAB
EXP DATE SOLUTION: NORMAL
EXP DATE SWAB: NORMAL
EXPIRATION DATE: NORMAL
LOT NUMBER POC: NORMAL
LOT NUMBER SOLUTION: NORMAL
LOT NUMBER SWAB: NORMAL
QUICKVUE INFLUENZA TEST: NEGATIVE
RSV RNA, POC: NEGATIVE
SARS-COV-2 RNA, POC: NEGATIVE
VALID INTERNAL CONTROL, POC: YES
VALID INTERNAL CONTROL, POC: YES

## 2023-12-14 PROCEDURE — 87804 INFLUENZA ASSAY W/OPTIC: CPT | Performed by: PHYSICIAN ASSISTANT

## 2023-12-14 PROCEDURE — 3080F DIAST BP >= 90 MM HG: CPT | Performed by: PHYSICIAN ASSISTANT

## 2023-12-14 PROCEDURE — 99417 PROLNG OP E/M EACH 15 MIN: CPT | Performed by: PHYSICIAN ASSISTANT

## 2023-12-14 PROCEDURE — 87635 SARS-COV-2 COVID-19 AMP PRB: CPT | Performed by: PHYSICIAN ASSISTANT

## 2023-12-14 PROCEDURE — 3044F HG A1C LEVEL LT 7.0%: CPT | Performed by: PHYSICIAN ASSISTANT

## 2023-12-14 PROCEDURE — 87634 RSV DNA/RNA AMP PROBE: CPT | Performed by: PHYSICIAN ASSISTANT

## 2023-12-14 PROCEDURE — 99215 OFFICE O/P EST HI 40 MIN: CPT | Performed by: PHYSICIAN ASSISTANT

## 2023-12-14 PROCEDURE — 3075F SYST BP GE 130 - 139MM HG: CPT | Performed by: PHYSICIAN ASSISTANT

## 2023-12-14 RX ORDER — PRAVASTATIN SODIUM 20 MG
20 TABLET ORAL
Qty: 30 TABLET | Refills: 5 | Status: SHIPPED | OUTPATIENT
Start: 2023-12-14

## 2023-12-14 RX ORDER — AZITHROMYCIN 250 MG/1
250 TABLET, FILM COATED ORAL SEE ADMIN INSTRUCTIONS
Qty: 6 TABLET | Refills: 0 | Status: SHIPPED | OUTPATIENT
Start: 2023-12-14 | End: 2023-12-19

## 2023-12-14 ASSESSMENT — PATIENT HEALTH QUESTIONNAIRE - PHQ9
2. FEELING DOWN, DEPRESSED OR HOPELESS: 0
1. LITTLE INTEREST OR PLEASURE IN DOING THINGS: 0
SUM OF ALL RESPONSES TO PHQ9 QUESTIONS 1 & 2: 0
SUM OF ALL RESPONSES TO PHQ QUESTIONS 1-9: 0

## 2023-12-14 NOTE — PROGRESS NOTES
Davian Lovell (:  1965) is a 59 y.o. female,Established patient, here for evaluation of the following chief complaint(s):  Diabetes (Pt is here for a 3 month diabetic f/u with lab review. ), Hypertension, and Hyperlipidemia         ASSESSMENT/PLAN:  1. Type 2 diabetes mellitus with diabetic polyneuropathy, without long-term current use of insulin (HCC)  -     Comprehensive Metabolic Panel; Future  -     Hemoglobin A1C; Future  -     Microalbumin / Creatinine Urine Ratio; Future  2. Mixed hyperlipidemia  -     pravastatin (PRAVACHOL) 20 MG tablet; Take 1 tablet by mouth nightly, Disp-30 tablet, R-5Normal  -     Comprehensive Metabolic Panel; Future  -     Lipid Panel; Future  3. Fatigue, unspecified type  -     AMB POC COVID-19 COV  -     AMB POC RAPID INFLUENZA TEST  -     AMB POC RSV  4. Fever, unspecified fever cause  -     AMB POC COVID-19 COV  -     AMB POC RAPID INFLUENZA TEST  -     AMB POC RSV  5. Productive cough  -     AMB POC COVID-19 COV  -     AMB POC RAPID INFLUENZA TEST  -     AMB POC RSV  6. Essential hypertension  -     Comprehensive Metabolic Panel; Future  7. Low vitamin B12 level  8. Elevated AST (SGOT)  -     Comprehensive Metabolic Panel; Future  9. Acute URI of multiple sites  -     azithromycin (ZITHROMAX) 250 MG tablet; Take 1 tablet by mouth See Admin Instructions for 5 days 500mg on day 1 followed by 250mg on days 2 - 5, Disp-6 tablet, R-0Normal      Patient Instructions   Start Z-Pack   Recommend OTC Mucinex DM 12 Hour twice daily x 2 weeks  Plenty of rest and fluids  Discontinue Januvia  Start Ozempic 0.25 mg weekly - sample provided  Trial on Pravastatin 20 mg nightly  Monitor blood pressure daily and keep record to bring to next appointment  Continue chronic medications as prescribed  Monitor blood sugar daily and keep record to bring to next appointment  Reminded to stay well hydrated with plenty of water        Return in about 3 months (around 3/14/2024) for diabetes f/u.

## 2023-12-14 NOTE — PATIENT INSTRUCTIONS
Start Z-Pack   Recommend OTC Mucinex DM 12 Hour twice daily x 2 weeks  Plenty of rest and fluids  Discontinue Januvia  Start Ozempic 0.25 mg weekly - sample provided  Trial on Pravastatin 20 mg nightly  Monitor blood pressure daily and keep record to bring to next appointment  Continue chronic medications as prescribed  Monitor blood sugar daily and keep record to bring to next appointment  Reminded to stay well hydrated with plenty of water

## 2023-12-19 DIAGNOSIS — S16.1XXA CERVICAL STRAIN, ACUTE, INITIAL ENCOUNTER: ICD-10-CM

## 2023-12-19 DIAGNOSIS — R20.0 RIGHT UPPER EXTREMITY NUMBNESS: ICD-10-CM

## 2023-12-19 RX ORDER — METHOCARBAMOL 750 MG/1
TABLET, FILM COATED ORAL
Qty: 40 TABLET | Refills: 0 | OUTPATIENT
Start: 2023-12-19

## 2023-12-22 RX ORDER — DICLOFENAC SODIUM 75 MG/1
75 TABLET, DELAYED RELEASE ORAL 2 TIMES DAILY
Qty: 60 TABLET | Refills: 0 | OUTPATIENT
Start: 2023-12-22

## 2024-02-05 ENCOUNTER — TELEPHONE (OUTPATIENT)
Dept: INTERNAL MEDICINE CLINIC | Facility: CLINIC | Age: 59
End: 2024-02-05

## 2024-02-05 NOTE — TELEPHONE ENCOUNTER
Mammogram order should still be valid - just provide radiology scheduling # so she can contact them.  Colonoscopy was due to be repeated this year but not until September so we will plan to place the referral a bit later this year.  She is due back to see me in mid March for diabetes f/u with fasting labs done the week prior but nothing is scheduled.  Please get her on the schedule for these appointments.

## 2024-02-05 NOTE — TELEPHONE ENCOUNTER
Pt requesting order for colonoscopy and mammogram. Colonoscopy last completed on 9/26/2019 where polyps were removed. She also had a negative occult blood on 7/21/2022.  Mammogram orders were placed on 5/26/2023 by Andreea Zaman NP. Will notify pt.

## 2024-02-05 NOTE — TELEPHONE ENCOUNTER
----- Message from Lauryn Hnies sent at 2/5/2024 11:29 AM EST -----  Subject: Referral Request    Reason for referral request? 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 Pt requesting order for Mammogram (pt   has Elmora's disease)  ---------------------------------------------------------------------------  --------------  CALL BACK INFO    [phone]; OK to leave message on voicemail  ---------------------------------------------------------------------------  --------------

## 2024-02-05 NOTE — TELEPHONE ENCOUNTER
----- Message from Lauryn Hines sent at 2/5/2024 11:27 AM EST -----  Subject: Referral Request    Reason for referral request? Pt requesting an order to get a colonoscopy   done, prefers to go to [place] location. Pt Mother and father has hx of   colon cancer  Provider patient wants to be referred to(if known):     Provider Phone Number(if known):    Additional Information for Provider? Pt requesting an order to get a   colonoscopy done, prefers to go to [place] location. Pt Mother and father   has hx of colon cancer  ---------------------------------------------------------------------------  --------------  CALL BACK INFO    [phone]; OK to leave message on voicemail  ---------------------------------------------------------------------------  --------------

## 2024-03-05 ENCOUNTER — OFFICE VISIT (OUTPATIENT)
Dept: INTERNAL MEDICINE CLINIC | Facility: CLINIC | Age: 59
End: 2024-03-05
Payer: COMMERCIAL

## 2024-03-05 VITALS
DIASTOLIC BLOOD PRESSURE: 80 MMHG | OXYGEN SATURATION: 96 % | HEIGHT: 67 IN | HEART RATE: 78 BPM | TEMPERATURE: 97 F | SYSTOLIC BLOOD PRESSURE: 118 MMHG | WEIGHT: 293 LBS | BODY MASS INDEX: 45.99 KG/M2

## 2024-03-05 DIAGNOSIS — R10.33 PERIUMBILICAL ABDOMINAL PAIN: Primary | ICD-10-CM

## 2024-03-05 DIAGNOSIS — R11.0 NAUSEA: ICD-10-CM

## 2024-03-05 DIAGNOSIS — R14.0 ABDOMINAL BLOATING: ICD-10-CM

## 2024-03-05 DIAGNOSIS — R19.8 IRREGULAR BOWEL HABITS: ICD-10-CM

## 2024-03-05 DIAGNOSIS — R19.02 ABDOMINAL WALL MASS OF LEFT UPPER QUADRANT: ICD-10-CM

## 2024-03-05 PROCEDURE — 99214 OFFICE O/P EST MOD 30 MIN: CPT | Performed by: PHYSICIAN ASSISTANT

## 2024-03-05 PROCEDURE — 3074F SYST BP LT 130 MM HG: CPT | Performed by: PHYSICIAN ASSISTANT

## 2024-03-05 PROCEDURE — 3079F DIAST BP 80-89 MM HG: CPT | Performed by: PHYSICIAN ASSISTANT

## 2024-03-05 ASSESSMENT — ENCOUNTER SYMPTOMS
CHANGE IN BOWEL HABIT: 0
NAUSEA: 1
DIARRHEA: 1
ABDOMINAL PAIN: 1
VOMITING: 1
CONSTIPATION: 1
BLOOD IN STOOL: 0
ABDOMINAL DISTENTION: 1

## 2024-03-05 NOTE — PROGRESS NOTES
Maricel Gallego (:  1965) is a 58 y.o. female,Established patient, here for evaluation of the following chief complaint(s):  Nausea & Vomiting (Pt c/o ongoing N/V for the past 3-4 months after eating meals. ) and Mass (Pt c/o a mass in her upper L quadrant of her abd that she first noticed about 3 days ago. She does report pain when palpating the mass. )         ASSESSMENT/PLAN:  1. Periumbilical abdominal pain  -     CT ABDOMEN PELVIS W IV CONTRAST Additional Contrast? Radiologist Recommendation; Future  2. Irregular bowel habits  -     CT ABDOMEN PELVIS W IV CONTRAST Additional Contrast? Radiologist Recommendation; Future  3. Abdominal bloating  -     CT ABDOMEN PELVIS W IV CONTRAST Additional Contrast? Radiologist Recommendation; Future  4. Nausea  -     CT ABDOMEN PELVIS W IV CONTRAST Additional Contrast? Radiologist Recommendation; Future  5. Abdominal wall mass of left upper quadrant  -     CT ABDOMEN PELVIS W IV CONTRAST Additional Contrast? Radiologist Recommendation; Future  6. Body mass index (BMI) 50.0-59.9, adult (HCC)      Patient Instructions   Trial on Restora probiotic daily - samples given and asked to order to take for at least 3 months  Will consider adding Omeprazole back in to reduce acidity and calm nausea if Restora doesn't improve symptoms  Discussed gallbladder disease as a potential etiology but will await CT results before determining need for additional testing specific to potential gallbladder disease  Suggest having fasting labs that are due done prior to scheduled CT scan to avoid having to have multiple sets of blood work drawn  Encouraged to stay well hydrated with plenty of water  Recommend a high fiber diet to help balance stool consistency & regularity      Return in about 4 weeks (around 2024) for diabetes f/u.         Subjective   SUBJECTIVE/OBJECTIVE:  Nausea & Vomiting  This is a new problem. Episode onset: 3-4 months ago. The problem occurs constantly (initially

## 2024-03-05 NOTE — PATIENT INSTRUCTIONS
Trial on Restora probiotic daily - samples given and asked to order to take for at least 3 months  Will consider adding Omeprazole back in to reduce acidity and calm nausea if Restora doesn't improve symptoms  Discussed gallbladder disease as a potential etiology but will await CT results before determining need for additional testing specific to potential gallbladder disease  Suggest having fasting labs that are due done prior to scheduled CT scan to avoid having to have multiple sets of blood work drawn  Encouraged to stay well hydrated with plenty of water  Recommend a high fiber diet to help balance stool consistency & regularity

## 2024-03-18 ENCOUNTER — HOSPITAL ENCOUNTER (OUTPATIENT)
Dept: CT IMAGING | Age: 59
Discharge: HOME | End: 2024-03-21

## 2024-03-18 DIAGNOSIS — R10.33 PERIUMBILICAL ABDOMINAL PAIN: ICD-10-CM

## 2024-03-18 DIAGNOSIS — R19.02 ABDOMINAL WALL MASS OF LEFT UPPER QUADRANT: ICD-10-CM

## 2024-03-18 DIAGNOSIS — R14.0 ABDOMINAL BLOATING: ICD-10-CM

## 2024-03-18 DIAGNOSIS — R19.8 IRREGULAR BOWEL HABITS: ICD-10-CM

## 2024-03-18 DIAGNOSIS — R11.0 NAUSEA: ICD-10-CM

## 2024-03-21 ENCOUNTER — PATIENT MESSAGE (OUTPATIENT)
Dept: INTERNAL MEDICINE CLINIC | Facility: CLINIC | Age: 59
End: 2024-03-21

## 2024-03-21 DIAGNOSIS — I72.8 SPLENIC ARTERY ANEURYSM (HCC): Primary | ICD-10-CM

## 2024-03-21 NOTE — TELEPHONE ENCOUNTER
Requested that Dr. Thakur review pt's CT results since Jo Ann Mccauley is not in the office this week. Dr. Thakur states that he will send pt a detailed MyChart message and that he will send a referral to vascular for further review. Pt notified and verbalized understanding.

## 2024-03-21 NOTE — PROGRESS NOTES
[employer] message sent to the patient.      Splenic artery aneurysm, 2.5 cm.    Vascular surgery consultation recommended.  Orders placed.    Orders Placed This Encounter    [facility] Vascular Surgery, [place]     Referral Priority:   Urgent     Referral Type:   Eval and Treat     Referral Reason:   Specialty Services Required     Requested Specialty:   Vascular Surgery     Number of Visits Requested:   1     MARYLU VALLECILLO MD

## 2024-03-21 NOTE — TELEPHONE ENCOUNTER
From: Maricel Gallego  To: Jo Ann Mccauley  Sent: 3/21/2024 5:51 PM EDT  Subject: CT Scan    Hi.... I just reviewed the results of my CT scan and when I saw the words \"Aneurysm\" I immediately panicked !  Can you please call to discuss the CT results ASAP ???

## 2024-03-22 ENCOUNTER — TELEPHONE (OUTPATIENT)
Dept: INTERNAL MEDICINE CLINIC | Facility: CLINIC | Age: 59
End: 2024-03-22

## 2024-03-22 NOTE — TELEPHONE ENCOUNTER
----- Message from Maricel Gallego sent at 3/22/2024  5:29 PM EDT -----  Regarding: CT and splenic artery aneurysm finding and thoughts and plan  Contact: [phone]  I really like your attention to detail Dr. Thakur and would like to have you as my PC Physician if possible.

## 2024-03-28 ENCOUNTER — OFFICE VISIT (OUTPATIENT)
Dept: VASCULAR SURGERY | Age: 59
End: 2024-03-28
Payer: COMMERCIAL

## 2024-03-28 VITALS
HEIGHT: 67 IN | DIASTOLIC BLOOD PRESSURE: 81 MMHG | HEART RATE: 77 BPM | WEIGHT: 293 LBS | OXYGEN SATURATION: 95 % | BODY MASS INDEX: 45.99 KG/M2 | SYSTOLIC BLOOD PRESSURE: 124 MMHG

## 2024-03-28 DIAGNOSIS — I72.8 SPLENIC ARTERY ANEURYSM (HCC): Primary | ICD-10-CM

## 2024-03-28 PROCEDURE — 3074F SYST BP LT 130 MM HG: CPT | Performed by: STUDENT IN AN ORGANIZED HEALTH CARE EDUCATION/TRAINING PROGRAM

## 2024-03-28 PROCEDURE — 3078F DIAST BP <80 MM HG: CPT | Performed by: STUDENT IN AN ORGANIZED HEALTH CARE EDUCATION/TRAINING PROGRAM

## 2024-03-28 PROCEDURE — 99244 OFF/OP CNSLTJ NEW/EST MOD 40: CPT | Performed by: STUDENT IN AN ORGANIZED HEALTH CARE EDUCATION/TRAINING PROGRAM

## 2024-03-28 NOTE — PROGRESS NOTES
VASCULAR SURGERY   [address] [address]  [phone] FAX: [phone]        Maricel Gallego  : 1965    Reason for visit: Splenic artery aneurysm    Chief Complaint: 58 y.o. female presents with splenic artery aneurysm seen on CT abdomen pelvis for periumbilical pain, bloating and nausea. Discussed indication for repair of splenic artery aneurysm is 3cm.     Plan:   Splenic artery aneurysm: Follow-up in 6 months for splenic artery ultrasound    Level 4 and Progression of chronic illness    Imaging interrupted:   CTA Abd/Pelvis  IMPRESSION:  1. Borderline hepatosplenomegaly with suspected hepatic steatosis.  2. Dilation and tortuosity of the splenic artery with aneurysmal dilation that  is focal measuring up to 2.5 cm. Scattered atherosclerotic disease identified.  3. Small fat-containing umbilical hernia  4. Chronic changes as above.    Constitutional:   Negative for fevers and unexplained weight loss.  Eyes:   Negative for visual disturbance.  ENT:   Negative for significant hearing loss and tinnitus.  Respiratory:   Negative for hemoptysis.  Cardiovascular:   Negative except as noted in HPI.  Gastrointestinal:   Negative for melena and abdominal pain.  Genitourinary:   Negative for hematuria, renal stones.  Integumentary:   Negative for rash or non-healing wounds  Hematologic/Lymphatic:   Negative for excessive bleeding hx or clotting disorder.  Musculoskeletal:  Negative for active, unexplained/severe joint pain.  Neurological:   Negative for stroke.    Behavioral/Psych:   Negative for suicidal ideations.    Endocrine:   Negative for uncontrolled diabetic symptoms including polyuria, polydipsia and poor wound healing.     Physical Examination:   Height: 1.702 m (5' 7\"), Weight - Scale: (!) 152.4 kg (336 lb), BP: 124/81    General: No acute distress  HENT: AT  CV: Regular rhythm.    LUNG: No respiratory distress on RA  Abdominal: No distension.  Extremities:  2+ edema

## 2024-04-01 DIAGNOSIS — R74.01 ELEVATED AST (SGOT): ICD-10-CM

## 2024-04-01 DIAGNOSIS — E78.2 MIXED HYPERLIPIDEMIA: ICD-10-CM

## 2024-04-01 DIAGNOSIS — E11.42 TYPE 2 DIABETES MELLITUS WITH DIABETIC POLYNEUROPATHY, WITHOUT LONG-TERM CURRENT USE OF INSULIN (HCC): ICD-10-CM

## 2024-04-01 DIAGNOSIS — I10 ESSENTIAL HYPERTENSION: ICD-10-CM

## 2024-04-01 LAB
ALBUMIN SERPL-MCNC: 3.6 G/DL (ref 3.2–4.6)
ALBUMIN/GLOB SERPL: 0.8 (ref 0.4–1.6)
ALP SERPL-CCNC: 118 U/L (ref 50–136)
ALT SERPL-CCNC: 70 U/L (ref 12–65)
ANION GAP SERPL CALC-SCNC: 2 MMOL/L (ref 2–11)
AST SERPL-CCNC: 44 U/L (ref 15–37)
BILIRUB SERPL-MCNC: 0.4 MG/DL (ref 0.2–1.1)
BUN SERPL-MCNC: 18 MG/DL (ref 8–23)
CALCIUM SERPL-MCNC: 9.1 MG/DL (ref 8.3–10.4)
CHLORIDE SERPL-SCNC: 106 MMOL/L (ref 103–113)
CHOLEST SERPL-MCNC: 215 MG/DL
CO2 SERPL-SCNC: 28 MMOL/L (ref 21–32)
CREAT SERPL-MCNC: 0.8 MG/DL (ref 0.6–1)
CREAT UR-MCNC: 91 MG/DL
EST. AVERAGE GLUCOSE BLD GHB EST-MCNC: 131 MG/DL
GLOBULIN SER CALC-MCNC: 4.4 G/DL (ref 2.8–4.5)
GLUCOSE SERPL-MCNC: 131 MG/DL (ref 65–100)
HBA1C MFR BLD: 6.2 % (ref 4.8–5.6)
HDLC SERPL-MCNC: 54 MG/DL (ref 40–60)
HDLC SERPL: 4
LDLC SERPL CALC-MCNC: 121.6 MG/DL
MICROALBUMIN UR-MCNC: 0.85 MG/DL
MICROALBUMIN/CREAT UR-RTO: 9 MG/G (ref 0–30)
POTASSIUM SERPL-SCNC: 4.4 MMOL/L (ref 3.5–5.1)
PROT SERPL-MCNC: 8 G/DL (ref 6.3–8.2)
SODIUM SERPL-SCNC: 136 MMOL/L (ref 136–146)
TRIGL SERPL-MCNC: 197 MG/DL (ref 35–150)
VLDLC SERPL CALC-MCNC: 39.4 MG/DL (ref 6–23)

## 2024-04-06 SDOH — ECONOMIC STABILITY: INCOME INSECURITY: HOW HARD IS IT FOR YOU TO PAY FOR THE VERY BASICS LIKE FOOD, HOUSING, MEDICAL CARE, AND HEATING?: NOT VERY HARD

## 2024-04-06 SDOH — ECONOMIC STABILITY: FOOD INSECURITY: WITHIN THE PAST 12 MONTHS, YOU WORRIED THAT YOUR FOOD WOULD RUN OUT BEFORE YOU GOT MONEY TO BUY MORE.: NEVER TRUE

## 2024-04-06 SDOH — ECONOMIC STABILITY: HOUSING INSECURITY
IN THE LAST 12 MONTHS, WAS THERE A TIME WHEN YOU DID NOT HAVE A STEADY PLACE TO SLEEP OR SLEPT IN A SHELTER (INCLUDING NOW)?: NO

## 2024-04-06 SDOH — ECONOMIC STABILITY: FOOD INSECURITY: WITHIN THE PAST 12 MONTHS, THE FOOD YOU BOUGHT JUST DIDN'T LAST AND YOU DIDN'T HAVE MONEY TO GET MORE.: NEVER TRUE

## 2024-04-06 SDOH — ECONOMIC STABILITY: TRANSPORTATION INSECURITY
IN THE PAST 12 MONTHS, HAS LACK OF TRANSPORTATION KEPT YOU FROM MEETINGS, WORK, OR FROM GETTING THINGS NEEDED FOR DAILY LIVING?: NO

## 2024-04-06 ASSESSMENT — PATIENT HEALTH QUESTIONNAIRE - PHQ9
SUM OF ALL RESPONSES TO PHQ QUESTIONS 1-9: 0
2. FEELING DOWN, DEPRESSED OR HOPELESS: NOT AT ALL
SUM OF ALL RESPONSES TO PHQ9 QUESTIONS 1 & 2: 0
SUM OF ALL RESPONSES TO PHQ QUESTIONS 1-9: 0
2. FEELING DOWN, DEPRESSED OR HOPELESS: NOT AT ALL
1. LITTLE INTEREST OR PLEASURE IN DOING THINGS: NOT AT ALL
SUM OF ALL RESPONSES TO PHQ9 QUESTIONS 1 & 2: 0
SUM OF ALL RESPONSES TO PHQ QUESTIONS 1-9: 0
SUM OF ALL RESPONSES TO PHQ QUESTIONS 1-9: 0
1. LITTLE INTEREST OR PLEASURE IN DOING THINGS: NOT AT ALL

## 2024-04-08 RX ORDER — PRAVASTATIN SODIUM 20 MG
20 TABLET ORAL
Qty: 30 TABLET | Refills: 5 | Status: CANCELLED | OUTPATIENT
Start: 2024-04-08

## 2024-04-08 ASSESSMENT — ENCOUNTER SYMPTOMS: SHORTNESS OF BREATH: 0

## 2024-04-08 NOTE — PROGRESS NOTES
for fatigue and unexpected weight change.   Eyes:  Negative for visual disturbance.   Respiratory:  Negative for shortness of breath.    Cardiovascular:  Positive for leg swelling (bilateral lower extremities). Negative for chest pain and palpitations.   Endocrine: Negative for polydipsia.   Genitourinary:  Positive for frequency.   Musculoskeletal:  Negative for myalgias.   Neurological:  Positive for numbness (bilateral feet). Negative for dizziness and headaches.        BP (!) 148/86 (Site: Left Upper Arm, Position: Sitting, Cuff Size: Large Adult)   Pulse 82   Temp (!) 96.6 °F (35.9 °C) (Temporal)   Ht 1.702 m (5' 7\")   Wt (!) 152.9 kg (337 lb)   LMP  (LMP Unknown)   SpO2 95%   BMI 52.78 kg/m²     /72   Pulse 82   Temp (!) 96.6 °F (35.9 °C) (Temporal)   Ht 1.702 m (5' 7\")   Wt (!) 152.9 kg (337 lb)   LMP  (LMP Unknown)   SpO2 95%   BMI 52.78 kg/m²       Objective   Physical Exam  Constitutional:       Appearance: Normal appearance.   HENT:      Head: Normocephalic and atraumatic.   Eyes:      Conjunctiva/sclera: Conjunctivae normal.      Pupils: Pupils are equal, round, and reactive to light.   Neck:      Vascular: No carotid bruit.   Cardiovascular:      Rate and Rhythm: Normal rate and regular rhythm.      Heart sounds: Normal heart sounds.   Pulmonary:      Effort: Pulmonary effort is normal.      Breath sounds: Normal breath sounds.   Musculoskeletal:         General: Normal range of motion.      Cervical back: Normal range of motion.      Right lower leg: Edema (1+ pitting) present.      Left lower leg: Edema (1+ pitting) present.      Comments: Wearing compression stockings   Skin:     General: Skin is warm and dry.   Neurological:      Mental Status: She is alert and oriented to person, place, and time.   Psychiatric:         Mood and Affect: Mood normal.         Behavior: Behavior normal.         Thought Content: Thought content normal.         Judgment: Judgment normal.       CT

## 2024-04-09 ENCOUNTER — OFFICE VISIT (OUTPATIENT)
Dept: INTERNAL MEDICINE CLINIC | Facility: CLINIC | Age: 59
End: 2024-04-09
Payer: COMMERCIAL

## 2024-04-09 VITALS
DIASTOLIC BLOOD PRESSURE: 72 MMHG | HEIGHT: 67 IN | OXYGEN SATURATION: 95 % | HEART RATE: 82 BPM | SYSTOLIC BLOOD PRESSURE: 124 MMHG | WEIGHT: 293 LBS | BODY MASS INDEX: 45.99 KG/M2 | TEMPERATURE: 96.6 F

## 2024-04-09 DIAGNOSIS — R19.8 IRREGULAR BOWEL HABITS: ICD-10-CM

## 2024-04-09 DIAGNOSIS — E78.2 MIXED HYPERLIPIDEMIA: ICD-10-CM

## 2024-04-09 DIAGNOSIS — Z80.0 FAMILY HISTORY OF COLON CANCER: ICD-10-CM

## 2024-04-09 DIAGNOSIS — R10.33 PERIUMBILICAL ABDOMINAL PAIN: ICD-10-CM

## 2024-04-09 DIAGNOSIS — E66.01 MORBID OBESITY WITH BMI OF 50.0-59.9, ADULT (HCC): ICD-10-CM

## 2024-04-09 DIAGNOSIS — I70.90 ATHEROSCLEROSIS OF ARTERIES: ICD-10-CM

## 2024-04-09 DIAGNOSIS — R74.8 ELEVATED LIVER ENZYMES: ICD-10-CM

## 2024-04-09 DIAGNOSIS — E11.42 TYPE 2 DIABETES MELLITUS WITH DIABETIC POLYNEUROPATHY, WITHOUT LONG-TERM CURRENT USE OF INSULIN (HCC): Primary | ICD-10-CM

## 2024-04-09 DIAGNOSIS — I10 ESSENTIAL HYPERTENSION: ICD-10-CM

## 2024-04-09 DIAGNOSIS — D50.8 HYPOCHROMIC RED BLOOD CELLS: ICD-10-CM

## 2024-04-09 PROCEDURE — 99215 OFFICE O/P EST HI 40 MIN: CPT | Performed by: PHYSICIAN ASSISTANT

## 2024-04-09 PROCEDURE — 3078F DIAST BP <80 MM HG: CPT | Performed by: PHYSICIAN ASSISTANT

## 2024-04-09 PROCEDURE — 3044F HG A1C LEVEL LT 7.0%: CPT | Performed by: PHYSICIAN ASSISTANT

## 2024-04-09 PROCEDURE — 3074F SYST BP LT 130 MM HG: CPT | Performed by: PHYSICIAN ASSISTANT

## 2024-04-09 NOTE — PATIENT INSTRUCTIONS
Discontinue Pravastatin due to concerns it is worsening liver enzyme elevation  Encouraged to reduce intake of high fat foods including anything fried, red meats, and certain dairy products (cheese, ice cream, butter/margarine, egg yolks) to improve health of liver and reduce cholesterol  Discussed benefits of retrying another GLP-1 like Trulicity with hopes that she might be able to tolerate it better without aggravating existing GI symptoms but would hopefully benefit from weight reduction effects and side benefit of possible liver function improvement  Continue chronic medications as prescribed including probiotic which has reduced the GI symptoms she'd been experiencing  Monitor blood pressure 2-3 times/week and keep record to bring to next appointment - if BP is frequently exceeding 130/80 we will consider adding Lisinopril once daily  Asked patient to contact me once her colonoscopy is completed if this occurs before our planned follow-up in 3 months so we can move up her visit and consider trial of Trulicity and/or a different cholesterol medication  Counseled about the importance of optimizing LDL cholesterol to < 70 despite recent intolerances to statins - may need to consider alternative therapies like Leqvio, Repatha, Praluent, Nexletol/Nexlezet

## 2024-05-08 ENCOUNTER — NURSE ONLY (OUTPATIENT)
Dept: INTERNAL MEDICINE CLINIC | Facility: CLINIC | Age: 59
End: 2024-05-08

## 2024-05-08 DIAGNOSIS — R74.8 ELEVATED LIVER ENZYMES: ICD-10-CM

## 2024-05-08 LAB
ALBUMIN SERPL-MCNC: 3.7 G/DL (ref 3.2–4.6)
ALBUMIN/GLOB SERPL: 0.9 (ref 1–1.9)
ALP SERPL-CCNC: 104 U/L (ref 35–104)
ALT SERPL-CCNC: 89 U/L (ref 12–65)
AST SERPL-CCNC: 75 U/L (ref 15–37)
BILIRUB DIRECT SERPL-MCNC: <0.2 MG/DL (ref 0–0.4)
BILIRUB SERPL-MCNC: 0.3 MG/DL (ref 0–1.2)
GLOBULIN SER CALC-MCNC: 4.2 G/DL (ref 2.3–3.5)
PROT SERPL-MCNC: 7.9 G/DL (ref 6.3–8.2)

## 2024-05-09 NOTE — RESULT ENCOUNTER NOTE
Liver enzymes actually got worse rather than better - did she stop the Pravastatin like we suggested?  Has she reduced intake of fatty foods at all?  Is she taking any Tylenol products in OTC meds, any vitamins or new supplements over the past year?

## 2024-05-09 NOTE — RESULT ENCOUNTER NOTE
I'd like to get an ultrasound done to look at her gallbladder - that isn't as well visualized on the CT scan as it is an ultrasound.  If she is agreeable, I will place the order.

## 2024-05-10 DIAGNOSIS — R74.8 ELEVATED LIVER ENZYMES: Primary | ICD-10-CM

## 2024-05-10 DIAGNOSIS — R10.33 PERIUMBILICAL ABDOMINAL PAIN: ICD-10-CM

## 2024-05-10 DIAGNOSIS — R19.8 IRREGULAR BOWEL HABITS: ICD-10-CM

## 2024-07-11 PROBLEM — Z80.0 FAMILY HISTORY OF COLON CANCER: Status: ACTIVE | Noted: 2024-04-22

## 2024-07-12 ENCOUNTER — TELEPHONE (OUTPATIENT)
Dept: INTERNAL MEDICINE CLINIC | Facility: CLINIC | Age: 59
End: 2024-07-12

## 2024-09-09 DIAGNOSIS — E11.42 TYPE 2 DIABETES MELLITUS WITH DIABETIC POLYNEUROPATHY, WITHOUT LONG-TERM CURRENT USE OF INSULIN (HCC): ICD-10-CM

## 2024-09-09 RX ORDER — SITAGLIPTIN 50 MG/1
50 TABLET, FILM COATED ORAL DAILY
Qty: 90 TABLET | Refills: 3 | OUTPATIENT
Start: 2024-09-09

## 2024-09-13 ENCOUNTER — OFFICE VISIT (OUTPATIENT)
Dept: INTERNAL MEDICINE CLINIC | Facility: CLINIC | Age: 59
End: 2024-09-13

## 2024-09-13 VITALS
HEART RATE: 74 BPM | DIASTOLIC BLOOD PRESSURE: 82 MMHG | SYSTOLIC BLOOD PRESSURE: 140 MMHG | HEIGHT: 67 IN | OXYGEN SATURATION: 97 % | BODY MASS INDEX: 45.99 KG/M2 | WEIGHT: 293 LBS | TEMPERATURE: 97.2 F

## 2024-09-13 DIAGNOSIS — R30.0 DYSURIA: ICD-10-CM

## 2024-09-13 DIAGNOSIS — R31.9 HEMATURIA, UNSPECIFIED TYPE: Primary | ICD-10-CM

## 2024-09-13 DIAGNOSIS — Z87.891 PERSONAL HISTORY OF TOBACCO USE: ICD-10-CM

## 2024-09-13 LAB
BILIRUBIN, URINE, POC: NEGATIVE
BLOOD URINE, POC: NEGATIVE
GLUCOSE URINE, POC: NEGATIVE
KETONES, URINE, POC: NEGATIVE
LEUKOCYTE ESTERASE, URINE, POC: NEGATIVE
NITRITE, URINE, POC: NEGATIVE
PH, URINE, POC: 6 (ref 4.6–8)
PROTEIN,URINE, POC: NORMAL
SPECIFIC GRAVITY, URINE, POC: 1.03 (ref 1–1.03)
URINALYSIS CLARITY, POC: CLEAR
URINALYSIS COLOR, POC: YELLOW
UROBILINOGEN, POC: NORMAL

## 2024-09-13 ASSESSMENT — ENCOUNTER SYMPTOMS
VOMITING: 0
NAUSEA: 0
COUGH: 0
SHORTNESS OF BREATH: 0
WHEEZING: 0
ABDOMINAL PAIN: 0
DIARRHEA: 0

## 2024-09-14 DIAGNOSIS — E11.42 TYPE 2 DIABETES MELLITUS WITH DIABETIC POLYNEUROPATHY, WITHOUT LONG-TERM CURRENT USE OF INSULIN (HCC): ICD-10-CM

## 2024-09-14 RX ORDER — SITAGLIPTIN 50 MG/1
50 TABLET, FILM COATED ORAL DAILY
Qty: 90 TABLET | Refills: 3 | OUTPATIENT
Start: 2024-09-14

## 2024-09-18 DIAGNOSIS — E11.42 TYPE 2 DIABETES MELLITUS WITH DIABETIC POLYNEUROPATHY, WITHOUT LONG-TERM CURRENT USE OF INSULIN (HCC): ICD-10-CM

## 2024-09-18 DIAGNOSIS — F41.0 PANIC ATTACKS: ICD-10-CM

## 2024-09-18 RX ORDER — DULOXETIN HYDROCHLORIDE 60 MG/1
CAPSULE, DELAYED RELEASE ORAL DAILY
Qty: 270 CAPSULE | OUTPATIENT
Start: 2024-09-18

## 2024-09-22 DIAGNOSIS — I10 ESSENTIAL HYPERTENSION: ICD-10-CM

## 2024-09-22 RX ORDER — METOPROLOL TARTRATE 50 MG
50 TABLET ORAL 2 TIMES DAILY
Qty: 180 TABLET | Refills: 3 | OUTPATIENT
Start: 2024-09-22

## 2024-09-26 DIAGNOSIS — I10 ESSENTIAL HYPERTENSION: ICD-10-CM

## 2024-09-26 DIAGNOSIS — E11.42 TYPE 2 DIABETES MELLITUS WITH DIABETIC POLYNEUROPATHY, WITHOUT LONG-TERM CURRENT USE OF INSULIN (HCC): ICD-10-CM

## 2024-09-26 RX ORDER — METOPROLOL TARTRATE 50 MG
50 TABLET ORAL 2 TIMES DAILY
Qty: 180 TABLET | Refills: 3 | OUTPATIENT
Start: 2024-09-26

## 2024-09-26 RX ORDER — METOPROLOL TARTRATE 50 MG
50 TABLET ORAL 2 TIMES DAILY
Qty: 180 TABLET | Refills: 0 | Status: SHIPPED | OUTPATIENT
Start: 2024-09-26

## 2024-09-26 NOTE — TELEPHONE ENCOUNTER
She is overdue for diabetes f/u which was due back in July.  Looks like she is scheduled for routine follow-up in late October so I've sent in a 90 day supply with no refills which will get her through and we can provide longer term prescription refills at the time she is seen in late October.

## 2024-10-03 DIAGNOSIS — E11.42 TYPE 2 DIABETES MELLITUS WITH DIABETIC POLYNEUROPATHY, WITHOUT LONG-TERM CURRENT USE OF INSULIN (HCC): ICD-10-CM

## 2024-10-03 DIAGNOSIS — R74.8 ELEVATED LIVER ENZYMES: ICD-10-CM

## 2024-10-03 DIAGNOSIS — I70.90 ATHEROSCLEROSIS OF ARTERIES: ICD-10-CM

## 2024-10-03 DIAGNOSIS — E78.2 MIXED HYPERLIPIDEMIA: Primary | ICD-10-CM

## 2024-10-03 DIAGNOSIS — I10 ESSENTIAL HYPERTENSION: ICD-10-CM

## 2024-10-03 DIAGNOSIS — D50.8 HYPOCHROMIC RED BLOOD CELLS: ICD-10-CM

## 2024-11-22 ENCOUNTER — TELEPHONE (OUTPATIENT)
Dept: ORTHOPEDIC SURGERY | Age: 59
End: 2024-11-22

## 2024-11-22 NOTE — TELEPHONE ENCOUNTER
Pt called and saw MEHUL last year for her right knee. She is currently seeing jacek but they wont do a mri for her. Will MEHUL see her again?

## 2024-11-29 ENCOUNTER — TELEPHONE (OUTPATIENT)
Dept: INTERNAL MEDICINE CLINIC | Facility: CLINIC | Age: 59
End: 2024-11-29

## 2024-11-29 NOTE — TELEPHONE ENCOUNTER
Lanette from [facility] called, states pt is currently at [facility]. Asks if Jo Ann will sign and follow pt's home health orders.

## 2024-11-29 NOTE — TELEPHONE ENCOUNTER
----- Message from Silvia S sent at 11/29/2024 12:48 PM EST -----  Regarding: ECC Escalation To Practice  ECC Escalation To Practice      Type of Escalation: Red Flag Symptom  --------------------------------------------------------------------------------------------------------------------------    Information for Provider:  Patient is looking for appointment for: Symptom emotional Distress and weakness  Reasons for Message: Unable to reach practice     Additional Information emotional Distress and weakness, Wants to se her pcp  --------------------------------------------------------------------------------------------------------------------------    Relationship to Patient: Self     Call Back Info: OK to leave message on voicemail  Preferred Call Back Number: Phone [phone] (home)

## 2024-12-02 ENCOUNTER — TELEPHONE (OUTPATIENT)
Dept: INTERNAL MEDICINE CLINIC | Facility: CLINIC | Age: 59
End: 2024-12-02

## 2024-12-02 NOTE — TELEPHONE ENCOUNTER
No availability with Jo Ann for a couple of weeks. Please contact pt to schedule with Andreea or Marianela in an extended slot if possible. Thank you.

## 2024-12-02 NOTE — TELEPHONE ENCOUNTER
Pt has been scheduled for 60 mins with Marianela and all notes added for what she needs to be seen for  Thank you, Raina

## 2024-12-02 NOTE — TELEPHONE ENCOUNTER
----- Message from Caitlyn CAMELIA sent at 12/2/2024 10:02 AM EST -----  Regarding: ECC Escalation To Practice  ECC Escalation To Practice      Type of Escalation: Red Flag Symptom  --------------------------------------------------------------------------------------------------------------------------    Information for Provider: Jo Ann Mccauley PA-C  Patient is looking for appointment for: Symptom Weakness with pain in legs and swelling of knees   Reasons for Message: Unable to reach practice     Additional Information Patient wants to see GISELE Mccauley and have a 1 hour appointment since patient have been having a lot of issues with her legs and knees and also needs to get some referral about it . Patient also have diarrhea and other issues   --------------------------------------------------------------------------------------------------------------------------    Relationship to Patient: Self     Call Back Info: OK to leave message on voicemail  Preferred Call Back Number: Phone  [phone]

## 2024-12-02 NOTE — TELEPHONE ENCOUNTER
From [facility], would like to know if jane will sign off on home health orders, states that today is the last day to get these approved

## 2024-12-03 ENCOUNTER — TELEPHONE (OUTPATIENT)
Dept: INTERNAL MEDICINE CLINIC | Facility: CLINIC | Age: 59
End: 2024-12-03

## 2024-12-03 NOTE — TELEPHONE ENCOUNTER
Yes, she needs to be seen by someone in the office.  There's a lot that has happened since she was last seen that is going to take a visit to get updated.

## 2024-12-03 NOTE — TELEPHONE ENCOUNTER
Pt evaluation was done and heart rate was high and she was crying (due to the death of her ) during the session so maybe that was why, but she wanted to let you know. She has an appt with ernesto tomorrow 12/4/24    120 was the heart rate the whole time

## 2024-12-04 ENCOUNTER — OFFICE VISIT (OUTPATIENT)
Dept: INTERNAL MEDICINE CLINIC | Facility: CLINIC | Age: 59
End: 2024-12-04
Payer: COMMERCIAL

## 2024-12-04 VITALS
SYSTOLIC BLOOD PRESSURE: 108 MMHG | WEIGHT: 260 LBS | DIASTOLIC BLOOD PRESSURE: 78 MMHG | BODY MASS INDEX: 40.81 KG/M2 | RESPIRATION RATE: 26 BRPM | TEMPERATURE: 97.2 F | HEIGHT: 67 IN

## 2024-12-04 DIAGNOSIS — F32.A ANXIETY AND DEPRESSION: ICD-10-CM

## 2024-12-04 DIAGNOSIS — F41.9 ANXIETY AND DEPRESSION: ICD-10-CM

## 2024-12-04 DIAGNOSIS — G89.29 BILATERAL CHRONIC KNEE PAIN: Primary | ICD-10-CM

## 2024-12-04 DIAGNOSIS — F41.0 PANIC ATTACKS: ICD-10-CM

## 2024-12-04 DIAGNOSIS — K52.9 CHRONIC DIARRHEA: ICD-10-CM

## 2024-12-04 DIAGNOSIS — M25.562 BILATERAL CHRONIC KNEE PAIN: Primary | ICD-10-CM

## 2024-12-04 DIAGNOSIS — E11.42 TYPE 2 DIABETES MELLITUS WITH DIABETIC POLYNEUROPATHY, WITHOUT LONG-TERM CURRENT USE OF INSULIN (HCC): ICD-10-CM

## 2024-12-04 DIAGNOSIS — L30.4 INTERTRIGO: ICD-10-CM

## 2024-12-04 DIAGNOSIS — M25.561 BILATERAL CHRONIC KNEE PAIN: Primary | ICD-10-CM

## 2024-12-04 LAB
ALBUMIN SERPL-MCNC: 2.2 G/DL (ref 3.2–4.6)
ALBUMIN/GLOB SERPL: 0.3 (ref 1–1.9)
ALP SERPL-CCNC: 191 U/L (ref 35–104)
ALT SERPL-CCNC: 13 U/L (ref 8–45)
ANION GAP SERPL CALC-SCNC: 20 MMOL/L (ref 7–16)
AST SERPL-CCNC: 30 U/L (ref 15–37)
BASOPHILS # BLD: 0.1 K/UL (ref 0–0.2)
BASOPHILS NFR BLD: 0 % (ref 0–2)
BILIRUB SERPL-MCNC: 0.6 MG/DL (ref 0–1.2)
BUN SERPL-MCNC: 12 MG/DL (ref 8–23)
CALCIUM SERPL-MCNC: 9.5 MG/DL (ref 8.8–10.2)
CHLORIDE SERPL-SCNC: 93 MMOL/L (ref 98–107)
CO2 SERPL-SCNC: 21 MMOL/L (ref 20–29)
CREAT SERPL-MCNC: 0.72 MG/DL (ref 0.6–1.1)
CRP SERPL-MCNC: 18.2 MG/DL (ref 0–0.4)
DIFFERENTIAL METHOD BLD: ABNORMAL
EOSINOPHIL # BLD: 0.1 K/UL (ref 0–0.8)
EOSINOPHIL NFR BLD: 1 % (ref 0.5–7.8)
ERYTHROCYTE [DISTWIDTH] IN BLOOD BY AUTOMATED COUNT: 16.4 % (ref 11.9–14.6)
ERYTHROCYTE [SEDIMENTATION RATE] IN BLOOD: 28 MM/HR (ref 0–30)
EST. AVERAGE GLUCOSE BLD GHB EST-MCNC: 136 MG/DL
GLOBULIN SER CALC-MCNC: 6.3 G/DL (ref 2.3–3.5)
GLUCOSE SERPL-MCNC: 126 MG/DL (ref 70–99)
HBA1C MFR BLD: 6.4 % (ref 0–5.6)
HCT VFR BLD AUTO: 39.4 % (ref 35.8–46.3)
HGB BLD-MCNC: 11.3 G/DL (ref 11.7–15.4)
IMM GRANULOCYTES # BLD AUTO: 0.1 K/UL (ref 0–0.5)
IMM GRANULOCYTES NFR BLD AUTO: 1 % (ref 0–5)
LYMPHOCYTES # BLD: 2 K/UL (ref 0.5–4.6)
LYMPHOCYTES NFR BLD: 15 % (ref 13–44)
MCH RBC QN AUTO: 22.9 PG (ref 26.1–32.9)
MCHC RBC AUTO-ENTMCNC: 28.7 G/DL (ref 31.4–35)
MCV RBC AUTO: 79.9 FL (ref 82–102)
MONOCYTES # BLD: 0.9 K/UL (ref 0.1–1.3)
MONOCYTES NFR BLD: 7 % (ref 4–12)
NEUTS SEG # BLD: 10.3 K/UL (ref 1.7–8.2)
NEUTS SEG NFR BLD: 76 % (ref 43–78)
NRBC # BLD: 0 K/UL (ref 0–0.2)
PLATELET # BLD AUTO: 586 K/UL (ref 150–450)
PMV BLD AUTO: 10.3 FL (ref 9.4–12.3)
POTASSIUM SERPL-SCNC: 4.3 MMOL/L (ref 3.5–5.1)
PROT SERPL-MCNC: 8.5 G/DL (ref 6.3–8.2)
RBC # BLD AUTO: 4.93 M/UL (ref 4.05–5.2)
SODIUM SERPL-SCNC: 134 MMOL/L (ref 136–145)
TSH W FREE THYROID IF ABNORMAL: 3.15 UIU/ML (ref 0.27–4.2)
URATE SERPL-MCNC: 5.2 MG/DL (ref 2.5–7.1)
WBC # BLD AUTO: 13.5 K/UL (ref 4.3–11.1)

## 2024-12-04 PROCEDURE — 3078F DIAST BP <80 MM HG: CPT | Performed by: PHYSICIAN ASSISTANT

## 2024-12-04 PROCEDURE — 99214 OFFICE O/P EST MOD 30 MIN: CPT | Performed by: PHYSICIAN ASSISTANT

## 2024-12-04 PROCEDURE — 3044F HG A1C LEVEL LT 7.0%: CPT | Performed by: PHYSICIAN ASSISTANT

## 2024-12-04 PROCEDURE — 3074F SYST BP LT 130 MM HG: CPT | Performed by: PHYSICIAN ASSISTANT

## 2024-12-04 RX ORDER — HYDROXYZINE HYDROCHLORIDE 25 MG/1
25 TABLET, FILM COATED ORAL EVERY 8 HOURS PRN
Qty: 90 TABLET | Refills: 0 | Status: SHIPPED | OUTPATIENT
Start: 2024-12-04

## 2024-12-04 RX ORDER — FLUCONAZOLE 150 MG/1
150 TABLET ORAL
Qty: 4 TABLET | Refills: 0 | Status: SHIPPED | OUTPATIENT
Start: 2024-12-04 | End: 2025-01-03

## 2024-12-04 RX ORDER — DULOXETIN HYDROCHLORIDE 60 MG/1
60 CAPSULE, DELAYED RELEASE ORAL 2 TIMES DAILY
Qty: 90 CAPSULE | Refills: 3 | Status: SHIPPED | OUTPATIENT
Start: 2024-12-04 | End: 2024-12-13 | Stop reason: SDUPTHER

## 2024-12-04 RX ORDER — TRAMADOL HYDROCHLORIDE 50 MG/1
50 TABLET ORAL EVERY 4 HOURS PRN
Qty: 30 TABLET | Refills: 0 | Status: SHIPPED | OUTPATIENT
Start: 2024-12-04 | End: 2024-12-09
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
## 2024-12-06 DIAGNOSIS — R79.82 ELEVATED C-REACTIVE PROTEIN (CRP): Primary | ICD-10-CM

## 2024-12-06 DIAGNOSIS — F41.0 PANIC ATTACKS: ICD-10-CM

## 2024-12-06 DIAGNOSIS — M25.562 CHRONIC PAIN OF BOTH KNEES: ICD-10-CM

## 2024-12-06 DIAGNOSIS — E11.42 TYPE 2 DIABETES MELLITUS WITH DIABETIC POLYNEUROPATHY, WITHOUT LONG-TERM CURRENT USE OF INSULIN (HCC): ICD-10-CM

## 2024-12-06 DIAGNOSIS — G89.29 CHRONIC PAIN OF BOTH KNEES: ICD-10-CM

## 2024-12-06 DIAGNOSIS — D50.9 IRON DEFICIENCY ANEMIA, UNSPECIFIED IRON DEFICIENCY ANEMIA TYPE: ICD-10-CM

## 2024-12-06 DIAGNOSIS — M25.561 CHRONIC PAIN OF BOTH KNEES: ICD-10-CM

## 2024-12-06 RX ORDER — SITAGLIPTIN 50 MG/1
50 TABLET, FILM COATED ORAL DAILY
Qty: 90 TABLET | Refills: 3 | OUTPATIENT
Start: 2024-12-06

## 2024-12-06 RX ORDER — METHYLPREDNISOLONE 4 MG/1
TABLET ORAL
Qty: 1 KIT | Refills: 0 | Status: SHIPPED | OUTPATIENT
Start: 2024-12-06 | End: 2024-12-12

## 2024-12-06 RX ORDER — DULOXETIN HYDROCHLORIDE 60 MG/1
CAPSULE, DELAYED RELEASE ORAL DAILY
Qty: 270 CAPSULE | OUTPATIENT
Start: 2024-12-06

## 2024-12-06 RX ORDER — FERROUS SULFATE 325(65) MG
325 TABLET ORAL
Qty: 90 TABLET | Refills: 1 | Status: SHIPPED | OUTPATIENT
Start: 2024-12-06

## 2024-12-13 DIAGNOSIS — I10 ESSENTIAL HYPERTENSION: ICD-10-CM

## 2024-12-13 DIAGNOSIS — F41.9 ANXIETY AND DEPRESSION: ICD-10-CM

## 2024-12-13 DIAGNOSIS — F32.A ANXIETY AND DEPRESSION: ICD-10-CM

## 2024-12-13 DIAGNOSIS — F41.0 PANIC ATTACKS: ICD-10-CM

## 2024-12-13 DIAGNOSIS — E11.42 TYPE 2 DIABETES MELLITUS WITH DIABETIC POLYNEUROPATHY, WITHOUT LONG-TERM CURRENT USE OF INSULIN (HCC): ICD-10-CM

## 2024-12-13 RX ORDER — METOPROLOL TARTRATE 50 MG
50 TABLET ORAL 2 TIMES DAILY
Qty: 180 TABLET | Refills: 0 | OUTPATIENT
Start: 2024-12-13

## 2024-12-13 RX ORDER — GABAPENTIN 300 MG/1
CAPSULE ORAL
Qty: 360 CAPSULE | Refills: 3 | OUTPATIENT
Start: 2024-12-13

## 2024-12-13 RX ORDER — DULOXETIN HYDROCHLORIDE 60 MG/1
CAPSULE, DELAYED RELEASE ORAL DAILY
Qty: 90 CAPSULE | Refills: 2 | OUTPATIENT
Start: 2024-12-13

## 2024-12-13 RX ORDER — DULOXETIN HYDROCHLORIDE 60 MG/1
60 CAPSULE, DELAYED RELEASE ORAL 2 TIMES DAILY
Qty: 180 CAPSULE | Refills: 1 | Status: SHIPPED | OUTPATIENT
Start: 2024-12-13

## 2024-12-13 NOTE — TELEPHONE ENCOUNTER
The new RX for pt's duloxetine 60 mg ER BID from 12/4/24 did not go to pharmacy. Can you please re-send?

## 2024-12-13 NOTE — TELEPHONE ENCOUNTER
Medication Refill Request    Name of Medication : cymbalta    Strength of Medication: Pt sates she was given dose higher than 60 mg at 12/4 appt. Pt does not know what it was increased to.    Directions: 1 capsule twice daily    30 day or 90 day supply: 90 capsule    Preferred Pharmacy: [facility] [address] in [place]    Last Appt. Date: 12/4/24    Next Appt. Date: 1/7/25    Additional Information For Provider: Pt states she needs this filled today.

## 2024-12-16 ENCOUNTER — TELEPHONE (OUTPATIENT)
Dept: INTERNAL MEDICINE CLINIC | Facility: CLINIC | Age: 59
End: 2024-12-16

## 2024-12-16 DIAGNOSIS — R59.0 RETROPERITONEAL LYMPHADENOPATHY: Primary | ICD-10-CM

## 2024-12-16 NOTE — TELEPHONE ENCOUNTER
Dr. Rodriguez from [facility] states that pt had a MRI of her lumbar spine on Dec. 13th that showed mild retroperitoneal lymphadenopathy with a recommendation of a CT abd/pelvis to further evaluate. Dr. Rodriguez is requesting that our office order the f/u CT scan and he states that he is happy to see pt back for bilateral knee injections due to her arthritis/elevated CRP levels since it may take some time for pt to get in with Rheumatology. I do see that pt has an appt with POA tomorrow with Dr. Lindo in regards to her knees already.

## 2024-12-17 ENCOUNTER — OFFICE VISIT (OUTPATIENT)
Dept: ORTHOPEDIC SURGERY | Age: 59
End: 2024-12-17

## 2024-12-17 DIAGNOSIS — M25.561 PAIN IN BOTH KNEES, UNSPECIFIED CHRONICITY: Primary | ICD-10-CM

## 2024-12-17 DIAGNOSIS — G89.29 CHRONIC PAIN OF LEFT KNEE: ICD-10-CM

## 2024-12-17 DIAGNOSIS — M25.562 PAIN IN BOTH KNEES, UNSPECIFIED CHRONICITY: Primary | ICD-10-CM

## 2024-12-17 DIAGNOSIS — M25.562 CHRONIC PAIN OF LEFT KNEE: ICD-10-CM

## 2024-12-17 RX ORDER — TRAMADOL HYDROCHLORIDE 50 MG/1
50 TABLET ORAL EVERY 4 HOURS PRN
Qty: 42 TABLET | Refills: 0 | Status: SHIPPED | OUTPATIENT
Start: 2024-12-17 | End: 2024-12-24

## 2024-12-17 NOTE — PROGRESS NOTES
Patient ID:  Maricel Gallego  [id number]  59 y.o.  1965    Today: December 17, 2024          Chief Complaint:  bilaterally knee pain   HPI:       Maricel Gallego is a 59 y.o. female seen for evaluation and treatment of bilaterally knee pain.. They have not had significant issues with the knee in the past. They do do not report an injury to the knee recently. Treatment to date has included Activity modification and Physical Therapy. Prior treatments have not provided significant relief.      Past Medical History:  Past Medical History:   Diagnosis Date    Atherosclerosis of arteries 03/18/2024    Scattered Throughout Abdominal Vasculature per CT Abdomen/Pelvis    Contact dermatitis 7/21/2021    COVID-19 12/2019    Diabetes (HCC)     GERD (gastroesophageal reflux disease)     [place]'s disease     Hypercholesterolemia     Hypertension     Overactive bladder     Splenic artery aneurysm (HCC) 03/18/2024    2.5 cm    Vitamin D deficiency        Past Surgical History:  Past Surgical History:   Procedure Laterality Date    COLONOSCOPY  09/26/2019    8 mm Sessile Cecal Polyp, Moderately Tortuous Colon, Multiple Small Mouthed Diverticula in Sigmoid Colon, Small Internal Hemorrhoids    HYSTERECTOMY, TOTAL ABDOMINAL (CERVIX REMOVED)  04/01/1992    LASER RESURFACING      x 5 - Birthmark on Face    LEG SURGERY Left     possible ORIF tibia ~ 1998    UPPER GASTROINTESTINAL ENDOSCOPY  09/26/2019    1 cm Hiatal Hernia, Diffuse Mild Inflammation        Medications:     Prior to Admission medications    Medication Sig Start Date End Date Taking? Authorizing Provider   traMADol (ULTRAM) 50 MG tablet Take 1 tablet by mouth every 4 hours as needed for Pain for up to 7 days. Intended supply: 7 days. Take lowest dose possible to manage pain Max Daily Amount: 300 mg 12/17/24 12/24/24 Yes Bharat Lindo MD   DULoxetine (CYMBALTA) 60 MG extended release capsule Take 1 capsule by mouth 2 times daily 12/13/24   Marianela Dunbar PA-C

## 2024-12-17 NOTE — TELEPHONE ENCOUNTER
Order placed but I have not seen this patient since April 2024.  Looks like she has an upcoming appointment on 1/7/25 so please make sure patient keeps that appointment to go over labs done in December as well as these CT results.

## 2024-12-20 DIAGNOSIS — M25.562 CHRONIC PAIN OF LEFT KNEE: Primary | ICD-10-CM

## 2024-12-20 DIAGNOSIS — G89.29 CHRONIC PAIN OF LEFT KNEE: Primary | ICD-10-CM

## 2024-12-23 DIAGNOSIS — G89.29 CHRONIC PAIN OF LEFT KNEE: Primary | ICD-10-CM

## 2024-12-23 DIAGNOSIS — M25.562 CHRONIC PAIN OF LEFT KNEE: Primary | ICD-10-CM

## 2024-12-30 ENCOUNTER — TELEPHONE (OUTPATIENT)
Dept: INTERNAL MEDICINE CLINIC | Facility: CLINIC | Age: 59
End: 2024-12-30

## 2025-01-03 ENCOUNTER — HOSPITAL ENCOUNTER (OUTPATIENT)
Dept: INTERVENTIONAL RADIOLOGY/VASCULAR | Age: 60
Discharge: HOME | End: 2025-01-03
Attending: ORTHOPAEDIC SURGERY
Payer: COMMERCIAL

## 2025-01-03 DIAGNOSIS — I10 ESSENTIAL HYPERTENSION: ICD-10-CM

## 2025-01-03 DIAGNOSIS — G89.29 CHRONIC PAIN OF LEFT KNEE: ICD-10-CM

## 2025-01-03 DIAGNOSIS — M25.562 CHRONIC PAIN OF LEFT KNEE: ICD-10-CM

## 2025-01-03 LAB
CRP SERPL-MCNC: 9 MG/DL (ref 0–0.4)
ERYTHROCYTE [SEDIMENTATION RATE] IN BLOOD: 112 MM/HR (ref 0–30)

## 2025-01-03 PROCEDURE — 6360000002 HC RX W HCPCS: Performed by: RADIOLOGY

## 2025-01-03 PROCEDURE — 20611 DRAIN/INJ JOINT/BURSA W/US: CPT | Performed by: RADIOLOGY

## 2025-01-03 PROCEDURE — 87186 SC STD MICRODIL/AGAR DIL: CPT

## 2025-01-03 PROCEDURE — 87205 SMEAR GRAM STAIN: CPT

## 2025-01-03 PROCEDURE — 86140 C-REACTIVE PROTEIN: CPT

## 2025-01-03 PROCEDURE — 77002 NEEDLE LOCALIZATION BY XRAY: CPT

## 2025-01-03 PROCEDURE — 85652 RBC SED RATE AUTOMATED: CPT

## 2025-01-03 PROCEDURE — 87070 CULTURE OTHR SPECIMN AEROBIC: CPT

## 2025-01-03 PROCEDURE — 87077 CULTURE AEROBIC IDENTIFY: CPT

## 2025-01-03 RX ORDER — LIDOCAINE HYDROCHLORIDE 20 MG/ML
INJECTION, SOLUTION INFILTRATION; PERINEURAL PRN
Status: SHIPPED | OUTPATIENT
Start: 2025-01-03

## 2025-01-03 RX ORDER — METOPROLOL TARTRATE 50 MG
50 TABLET ORAL 2 TIMES DAILY
Qty: 180 TABLET | Refills: 0 | OUTPATIENT
Start: 2025-01-03

## 2025-01-03 RX ADMIN — LIDOCAINE HYDROCHLORIDE 10 ML: 20 INJECTION, SOLUTION INFILTRATION; PERINEURAL at 14:36

## 2025-01-03 NOTE — BRIEF OP NOTE
[facility]  Department of Interventional Radiology  [phone]        Interventional Radiology Brief Procedure Note    Patient: Maricel Gallego MRN: [id number]  SSN: [id number]    YOB: 1965  Age: 59 y.o.  Sex: female      Date of Procedure: 1/3/2025    Pre-Procedure Diagnosis: left knee swelling    Post-Procedure Diagnosis: SAME    Procedure(s): arthrocentesis         Performed By: Augustin Dos Santos MD     Assistants: None    Anesthesia:Lidocaine    Estimated Blood Loss: None    Specimens:  Microbiology    Implants:  None    Findings: echogenic effusion probable hemarthrosis. Small washing sent for culture    Complications: None      Follow Up: Dr Lindo    Signed By: Augustin Dos Santos MD     January 3, 2025

## 2025-01-03 NOTE — DISCHARGE INSTRUCTIONS
If you have any questions about your procedure, please call the Interventional Radiology department at [phone].     After business hours (5pm) and weekends, call the answering service at [phone] and ask for the Interventional Radiologist on call to be paged.

## 2025-01-05 LAB
BACTERIA SPEC CULT: ABNORMAL
GRAM STN SPEC: ABNORMAL
GRAM STN SPEC: ABNORMAL
SERVICE CMNT-IMP: ABNORMAL

## 2025-01-06 ENCOUNTER — TELEPHONE (OUTPATIENT)
Dept: ORTHOPEDIC SURGERY | Age: 60
End: 2025-01-06

## 2025-01-06 NOTE — TELEPHONE ENCOUNTER
She went and had a procedure done the other day and would like to know what is next. Please call.

## 2025-01-07 ENCOUNTER — TELEPHONE (OUTPATIENT)
Dept: ORTHOPEDIC SURGERY | Age: 60
End: 2025-01-07

## 2025-01-07 ENCOUNTER — OFFICE VISIT (OUTPATIENT)
Dept: INTERNAL MEDICINE CLINIC | Facility: CLINIC | Age: 60
End: 2025-01-07
Payer: COMMERCIAL

## 2025-01-07 VITALS
BODY MASS INDEX: 37.59 KG/M2 | OXYGEN SATURATION: 99 % | TEMPERATURE: 96.9 F | SYSTOLIC BLOOD PRESSURE: 120 MMHG | WEIGHT: 240 LBS | RESPIRATION RATE: 16 BRPM | HEART RATE: 120 BPM | DIASTOLIC BLOOD PRESSURE: 80 MMHG

## 2025-01-07 DIAGNOSIS — F41.0 PANIC ATTACKS: ICD-10-CM

## 2025-01-07 DIAGNOSIS — I10 ESSENTIAL HYPERTENSION: ICD-10-CM

## 2025-01-07 DIAGNOSIS — R00.0 TACHYCARDIA: ICD-10-CM

## 2025-01-07 DIAGNOSIS — F43.23 SITUATIONAL MIXED ANXIETY AND DEPRESSIVE DISORDER: ICD-10-CM

## 2025-01-07 DIAGNOSIS — R39.9 UTI SYMPTOMS: ICD-10-CM

## 2025-01-07 DIAGNOSIS — R74.8 ELEVATED LIVER ENZYMES: ICD-10-CM

## 2025-01-07 DIAGNOSIS — R82.90 BAD ODOR OF URINE: ICD-10-CM

## 2025-01-07 DIAGNOSIS — R41.0 CONFUSION: ICD-10-CM

## 2025-01-07 DIAGNOSIS — E66.812 CLASS 2 SEVERE OBESITY WITH SERIOUS COMORBIDITY AND BODY MASS INDEX (BMI) OF 37.0 TO 37.9 IN ADULT, UNSPECIFIED OBESITY TYPE: ICD-10-CM

## 2025-01-07 DIAGNOSIS — E66.01 CLASS 2 SEVERE OBESITY WITH SERIOUS COMORBIDITY AND BODY MASS INDEX (BMI) OF 37.0 TO 37.9 IN ADULT, UNSPECIFIED OBESITY TYPE: ICD-10-CM

## 2025-01-07 DIAGNOSIS — D50.9 MICROCYTIC HYPOCHROMIC ANEMIA: ICD-10-CM

## 2025-01-07 DIAGNOSIS — I82.491 ACUTE DEEP VEIN THROMBOSIS (DVT) OF OTHER SPECIFIED VEIN OF RIGHT LOWER EXTREMITY (HCC): ICD-10-CM

## 2025-01-07 DIAGNOSIS — M79.605 LEFT LEG PAIN: ICD-10-CM

## 2025-01-07 DIAGNOSIS — E78.2 MIXED HYPERLIPIDEMIA: ICD-10-CM

## 2025-01-07 DIAGNOSIS — R79.82 ELEVATED C-REACTIVE PROTEIN (CRP): ICD-10-CM

## 2025-01-07 DIAGNOSIS — M25.062 HEMARTHROSIS OF LEFT KNEE: ICD-10-CM

## 2025-01-07 DIAGNOSIS — E11.42 TYPE 2 DIABETES MELLITUS WITH DIABETIC POLYNEUROPATHY, WITHOUT LONG-TERM CURRENT USE OF INSULIN (HCC): Primary | ICD-10-CM

## 2025-01-07 PROCEDURE — 81003 URINALYSIS AUTO W/O SCOPE: CPT | Performed by: PHYSICIAN ASSISTANT

## 2025-01-07 PROCEDURE — 99417 PROLNG OP E/M EACH 15 MIN: CPT | Performed by: PHYSICIAN ASSISTANT

## 2025-01-07 PROCEDURE — 3078F DIAST BP <80 MM HG: CPT | Performed by: PHYSICIAN ASSISTANT

## 2025-01-07 PROCEDURE — G2211 COMPLEX E/M VISIT ADD ON: HCPCS | Performed by: PHYSICIAN ASSISTANT

## 2025-01-07 PROCEDURE — 3074F SYST BP LT 130 MM HG: CPT | Performed by: PHYSICIAN ASSISTANT

## 2025-01-07 PROCEDURE — 99215 OFFICE O/P EST HI 40 MIN: CPT | Performed by: PHYSICIAN ASSISTANT

## 2025-01-07 RX ORDER — IRON,FM,PS/FOLIC/B,C18/L.CASEI 130-1.25MG
1 CAPSULE ORAL DAILY
Qty: 30 CAPSULE | Refills: 5 | Status: SHIPPED | OUTPATIENT
Start: 2025-01-07

## 2025-01-07 RX ORDER — BLOOD-GLUCOSE METER
1 EACH MISCELLANEOUS DAILY
Qty: 1 KIT | Refills: 0 | Status: SHIPPED | OUTPATIENT
Start: 2025-01-07

## 2025-01-07 RX ORDER — LANCETS 33 GAUGE
EACH MISCELLANEOUS
Qty: 100 EACH | Refills: 3 | Status: SHIPPED | OUTPATIENT
Start: 2025-01-07

## 2025-01-07 RX ORDER — HYDROXYZINE HYDROCHLORIDE 25 MG/1
25 TABLET, FILM COATED ORAL EVERY 8 HOURS PRN
Qty: 90 TABLET | Refills: 5 | Status: SHIPPED | OUTPATIENT
Start: 2025-01-07

## 2025-01-07 RX ORDER — BLOOD SUGAR DIAGNOSTIC
STRIP MISCELLANEOUS
Qty: 100 EACH | Refills: 3 | Status: SHIPPED | OUTPATIENT
Start: 2025-01-07

## 2025-01-07 RX ORDER — ATORVASTATIN CALCIUM 20 MG/1
20 TABLET, FILM COATED ORAL DAILY
COMMUNITY
Start: 2024-10-16 | End: 2025-01-09

## 2025-01-07 ASSESSMENT — PATIENT HEALTH QUESTIONNAIRE - PHQ9
2. FEELING DOWN, DEPRESSED OR HOPELESS: SEVERAL DAYS
10. IF YOU CHECKED OFF ANY PROBLEMS, HOW DIFFICULT HAVE THESE PROBLEMS MADE IT FOR YOU TO DO YOUR WORK, TAKE CARE OF THINGS AT HOME, OR GET ALONG WITH OTHER PEOPLE: SOMEWHAT DIFFICULT
1. LITTLE INTEREST OR PLEASURE IN DOING THINGS: SEVERAL DAYS
9. THOUGHTS THAT YOU WOULD BE BETTER OFF DEAD, OR OF HURTING YOURSELF: NOT AT ALL
7. TROUBLE CONCENTRATING ON THINGS, SUCH AS READING THE NEWSPAPER OR WATCHING TELEVISION: SEVERAL DAYS
SUM OF ALL RESPONSES TO PHQ QUESTIONS 1-9: 6
3. TROUBLE FALLING OR STAYING ASLEEP: SEVERAL DAYS
5. POOR APPETITE OR OVEREATING: SEVERAL DAYS
4. FEELING TIRED OR HAVING LITTLE ENERGY: SEVERAL DAYS
SUM OF ALL RESPONSES TO PHQ9 QUESTIONS 1 & 2: 2
8. MOVING OR SPEAKING SO SLOWLY THAT OTHER PEOPLE COULD HAVE NOTICED. OR THE OPPOSITE - BEING SO FIDGETY OR RESTLESS THAT YOU HAVE BEEN MOVING AROUND A LOT MORE THAN USUAL: NOT AT ALL
4. FEELING TIRED OR HAVING LITTLE ENERGY: SEVERAL DAYS
SUM OF ALL RESPONSES TO PHQ QUESTIONS 1-9: 6
3. TROUBLE FALLING OR STAYING ASLEEP: SEVERAL DAYS
6. FEELING BAD ABOUT YOURSELF - OR THAT YOU ARE A FAILURE OR HAVE LET YOURSELF OR YOUR FAMILY DOWN: NOT AT ALL
9. THOUGHTS THAT YOU WOULD BE BETTER OFF DEAD, OR OF HURTING YOURSELF: NOT AT ALL
2. FEELING DOWN, DEPRESSED OR HOPELESS: SEVERAL DAYS
SUM OF ALL RESPONSES TO PHQ QUESTIONS 1-9: 6
SUM OF ALL RESPONSES TO PHQ QUESTIONS 1-9: 6
8. MOVING OR SPEAKING SO SLOWLY THAT OTHER PEOPLE COULD HAVE NOTICED. OR THE OPPOSITE, BEING SO FIGETY OR RESTLESS THAT YOU HAVE BEEN MOVING AROUND A LOT MORE THAN USUAL: NOT AT ALL
1. LITTLE INTEREST OR PLEASURE IN DOING THINGS: SEVERAL DAYS
6. FEELING BAD ABOUT YOURSELF - OR THAT YOU ARE A FAILURE OR HAVE LET YOURSELF OR YOUR FAMILY DOWN: NOT AT ALL
10. IF YOU CHECKED OFF ANY PROBLEMS, HOW DIFFICULT HAVE THESE PROBLEMS MADE IT FOR YOU TO DO YOUR WORK, TAKE CARE OF THINGS AT HOME, OR GET ALONG WITH OTHER PEOPLE: SOMEWHAT DIFFICULT
7. TROUBLE CONCENTRATING ON THINGS, SUCH AS READING THE NEWSPAPER OR WATCHING TELEVISION: SEVERAL DAYS
SUM OF ALL RESPONSES TO PHQ QUESTIONS 1-9: 6
5. POOR APPETITE OR OVEREATING: SEVERAL DAYS

## 2025-01-07 ASSESSMENT — ENCOUNTER SYMPTOMS
NAUSEA: 0
BLOOD IN STOOL: 0
VOMITING: 0
DIARRHEA: 1
CONSTIPATION: 1
SHORTNESS OF BREATH: 1
ABDOMINAL PAIN: 0

## 2025-01-07 NOTE — PROGRESS NOTES
Maricel Gallego (:  1965) is a 59 y.o. female,Established patient, here for evaluation of the following chief complaint(s):  Follow-up          Assessment/Plan  1. Type 2 diabetes mellitus with diabetic polyneuropathy, without long-term current use of insulin (HCC)  -     Comprehensive Metabolic Panel; Future  -     Blood Glucose Monitoring Suppl (ONE TOUCH ULTRA 2) w/Device KIT; DAILY Starting 2025, Disp-1 kit, R-0, NormalUse to check glucose once daily. E11.42  -     blood glucose test strips (ONETOUCH ULTRA TEST) strip; Use to check glucose once daily.  Dx: D11.42, Disp-100 each, R-3Normal  -     OneTouch Delica Lancets 33G MISC; Use to check glucose once daily.  Dx: E11.42, Disp-100 each, R-3Normal  2. Panic attacks  -     hydrOXYzine HCl (ATARAX) 25 MG tablet; Take 1 tablet by mouth every 8 hours as needed for Itching or Anxiety, Disp-90 tablet, R-5Print  3. Bad odor of urine  -     AMB POC URINALYSIS DIP STICK AUTO W/O MICRO  4. Confusion  -     AMB POC URINALYSIS DIP STICK AUTO W/O MICRO  5. Class 2 severe obesity with serious comorbidity and body mass index (BMI) of 37.0 to 37.9 in adult, unspecified obesity type  6. Microcytic hypochromic anemia  -     CBC with Auto Differential; Future  -     Ferritin; Future  -     Iron and TIBC; Future  7. Elevated C-reactive protein (CRP)  8. Hemarthrosis of left knee  9. Mixed hyperlipidemia  -     Comprehensive Metabolic Panel; Future  -     Lipid Panel; Future  10. Essential hypertension  -     Comprehensive Metabolic Panel; Future  11. Tachycardia  12. Elevated liver enzymes  Comments:  Resolved w/ weight loss  Orders:  -     Comprehensive Metabolic Panel; Future  13. Acute deep vein thrombosis (DVT) of other specified vein of right lower extremity (HCC)  14. Situational mixed anxiety and depressive disorder  15. Left leg pain  -     Magnesium; Future         Patient Instructions   Advised to restart Hydroxyzine 25 mg and take on a scheduled basis

## 2025-01-08 ENCOUNTER — TELEPHONE (OUTPATIENT)
Dept: ORTHOPEDIC SURGERY | Age: 60
End: 2025-01-08

## 2025-01-08 DIAGNOSIS — M00.869 BACTERIAL INFECTION OF KNEE JOINT: ICD-10-CM

## 2025-01-08 DIAGNOSIS — G89.29 CHRONIC PAIN OF LEFT KNEE: Primary | ICD-10-CM

## 2025-01-08 DIAGNOSIS — M25.562 CHRONIC PAIN OF LEFT KNEE: Primary | ICD-10-CM

## 2025-01-08 NOTE — PATIENT INSTRUCTIONS
Advised to restart Hydroxyzine 25 mg and take on a scheduled basis for now  Recommend remaining at only 60 mg of Cymbalta daily due to risk of side effects at higher dose  Consider taking Tramadol every 6-8 hours along with Extra Strength Tylenol to try to lower overall pain levels throughout the day rather than just letting them build all day and then expecting full relief at night  Expressed concern over possible UTI and need to collect a urine sample so we can evaluate  Discontinue ferrous sulfate.  Replace with Fusion Plus daily - samples given with printed prescription to fill at [facility] if tolerating better than generic iron supplement - ferrous sulfate  Reviewed scheduled appt with pain management on 2/3/25 but suggest calling their office to request to be put on a cancellation list with hopes of being seen sooner  Reviewed reason for CT Abdomen & Pelvis that was ordered and provided radiology scheduling phone number so they can get scheduled  Informed that she is already established with [facility] so if going to see a rheumatologist for elevated CRP then she should just call and get an appointment with Dr. An rather than waiting to establish with someone new  Will fax lab orders to Home Health agency [facility] so they can get FASTING labs drawn at home  Will review benefits vs risks of remaining on Eliquis with supervising physician & ortho - Dr. Lindo with risks being that she is immobile and at risk of developing another clot if she comes off of it but staying on it leads to more bleeding which is contributing to the joint pain in L knee as well as anemia

## 2025-01-09 ENCOUNTER — TELEPHONE (OUTPATIENT)
Dept: SURGERY | Age: 60
End: 2025-01-09

## 2025-01-09 ENCOUNTER — HOSPITAL ENCOUNTER (OUTPATIENT)
Dept: SURGERY | Age: 60
Discharge: HOME | End: 2025-01-12
Payer: COMMERCIAL

## 2025-01-09 ENCOUNTER — TELEPHONE (OUTPATIENT)
Dept: ORTHOPEDIC SURGERY | Age: 60
End: 2025-01-09

## 2025-01-09 DIAGNOSIS — E78.2 MIXED HYPERLIPIDEMIA: ICD-10-CM

## 2025-01-09 DIAGNOSIS — R39.9 UTI SYMPTOMS: ICD-10-CM

## 2025-01-09 DIAGNOSIS — D50.9 MICROCYTIC HYPOCHROMIC ANEMIA: ICD-10-CM

## 2025-01-09 DIAGNOSIS — R82.90 BAD ODOR OF URINE: ICD-10-CM

## 2025-01-09 DIAGNOSIS — E11.42 TYPE 2 DIABETES MELLITUS WITH DIABETIC POLYNEUROPATHY, WITHOUT LONG-TERM CURRENT USE OF INSULIN (HCC): ICD-10-CM

## 2025-01-09 DIAGNOSIS — R74.8 ELEVATED LIVER ENZYMES: ICD-10-CM

## 2025-01-09 DIAGNOSIS — I10 ESSENTIAL HYPERTENSION: ICD-10-CM

## 2025-01-09 DIAGNOSIS — R82.90 ABNORMAL FINDING ON URINALYSIS: ICD-10-CM

## 2025-01-09 DIAGNOSIS — M79.605 LEFT LEG PAIN: ICD-10-CM

## 2025-01-09 DIAGNOSIS — R39.89 SUSPECTED UTI: Primary | ICD-10-CM

## 2025-01-09 LAB
BILIRUBIN, URINE, POC: NEGATIVE
BLOOD URINE, POC: ABNORMAL
GLUCOSE URINE, POC: NEGATIVE
HGB BLD-MCNC: 10.7 G/DL (ref 11.7–15.4)
KETONES, URINE, POC: NEGATIVE
LEUKOCYTE ESTERASE, URINE, POC: ABNORMAL
NITRITE, URINE, POC: NEGATIVE
PH, URINE, POC: 7 (ref 4.6–8)
PROTEIN,URINE, POC: 30
SPECIFIC GRAVITY, URINE, POC: 1.02 (ref 1–1.03)
URINALYSIS CLARITY, POC: ABNORMAL
URINALYSIS COLOR, POC: ABNORMAL
UROBILINOGEN, POC: ABNORMAL

## 2025-01-09 PROCEDURE — 85018 HEMOGLOBIN: CPT

## 2025-01-09 RX ORDER — CEPHALEXIN 500 MG/1
500 CAPSULE ORAL 2 TIMES DAILY
Qty: 14 CAPSULE | Refills: 0 | Status: SHIPPED | OUTPATIENT
Start: 2025-01-09 | End: 2025-01-16

## 2025-01-09 NOTE — PERIOP NOTE
PLEASE CONTINUE TAKING ALL PRESCRIPTION MEDICATIONS UP TO THE DAY OF SURGERY UNLESS OTHERWISE DIRECTED BELOW.     DISCONTINUE all over the counter vitamins, supplements, and herbals 7 days prior to surgery. DISCONTINUE Non-Steroidal Anti-Inflammatory (NSAIDS) such as Advil, Ibuprofen, Motrin, Naproxen, and Aleve 5 days prior to surgery.      *IT IS OK TO TAKE TYLENOL, Allergy medication, and indigestion medications*     Prescription medications to HOLD     Hold Januvia 3 days prior to surgery    Hold Diclofenac (Voltaren) 5 days prior to surgery     *FOLLOW SURGEON'S INSTRUCTIONS REGARDING ELIQUIS*      CONTINUE all other prescriptions like normal up until the day of surgery--TAKE ONLY THE BELOW MEDICATIONS THE DAY OF SURGERY.    Hydroxyzine if needed           cymbalta             Pantoprazole    Alubterol if needed               Metformin    Gabapentin                         Metoprolol      Comments       Bring Inhaler to the hospital on the day of surgery.          Please do not bring home medications with you on the day of surgery unless otherwise directed by your nurse.  If you are instructed to bring home medications, please give them to your nurse as they will be administered by the nursing staff.     If you have any questions, please call [facility] [phone].     A copy of this note was provided to the patient for reference.

## 2025-01-09 NOTE — RESULT ENCOUNTER NOTE
Given upcoming surgery we will go ahead and start antibiotic therapy just incase there is some bacterial component to her urine abnormalities & confusion noted in the mornings.

## 2025-01-09 NOTE — TELEPHONE ENCOUNTER
Medication CLEARANCE    Surgical, Procedural, or Medication Clearance    Physician or Practice Requesting Clearance: [facility]  : Candelaria David RN  Contact Phone Number: [phone]  Contact Fax Number: [phone]  Date of Surgery/Procedure: 1/13/25  Type of Surgery or Procedure: I&D  Type of Anesthesia: SPINAL  Medication to hold: Eliquis  Requested # of days to hold: 3 days (72 hours)      Please do not respond via e-mail to this message.

## 2025-01-09 NOTE — PERIOP NOTE
After reviewing chart this RN found that \"spinal\" anesthesia has been ordered. Patient called and instructed to hold Eliquis 3 days prior to surgery per anesthesia protocol.     Request to hold Eliquis 3 days prior to surgery requested from patient's PCP at [facility] via telephone encounter. Charge nurse to f/u.

## 2025-01-09 NOTE — PERIOP NOTE
Patient verified name and     Order for consent was not found in EHR; patient verified.     Type 1B surgery, walk-in assessment complete.    Labs per surgeon: No orders received.   Labs per anesthesia protocol: Hgb; results pending.  EKG: None per protocol.       Patient provided with and instructed on educational handouts including Guide to Surgery, Preventing Surgical Site Infections, Pain Management, and [facility] Brochure.    Patient answered medical/surgical history questions at their best of ability. All prior to admission medications documented in EPIC. Original medication prescription bottle was not visualized during patient appointment.     Patient instructed to hold all vitamins 7 days prior to surgery and NSAIDS 5 days prior to surgery, patient verbalized understanding.     Hold Januvia 3 days prior to surgery.     Follow surgeon's instructions regarding Eliquis.     Patient teach back successful and patient demonstrates knowledge of instructions.

## 2025-01-09 NOTE — TELEPHONE ENCOUNTER
She takes a blood thinner and is wondering if she needs to stop it for her upcoming surgery on Monday.

## 2025-01-09 NOTE — PERIOP NOTE
The below lab results are within anesthesia guidelines, no follow-up required. Labs automatically routed to ordering provider via Epic documentation.     Latest Reference Range & Units 01/09/25 11:06   Hemoglobin Quant 11.7 - 15.4 g/dL 10.7 (L)   (L): Data is abnormally low

## 2025-01-09 NOTE — RESULT ENCOUNTER NOTE
Urine test shows trace leukocytes - these can be found from skin cells that are caught in the urine on it's way out of the body or can also be infection - we are sending off a urine culture to determine the difference and will notify her early next week of results and if any treatment is necessary.

## 2025-01-09 NOTE — TELEPHONE ENCOUNTER
Medication CLEARANCE     Surgical, Procedural, or Medication Clearance     Physician or Practice Requesting Clearance: [facility]  : Candelaria David RN  Contact Phone Number: [phone]  Contact Fax Number: [phone]  Date of Surgery/Procedure: 1/13/25  Type of Surgery or Procedure: I&D  Type of Anesthesia: SPINAL  Medication to hold: Eliquis  Requested # of days to hold: 3 days (72 hours)        Please do not respond via e-mail to this message.

## 2025-01-10 ENCOUNTER — PREP FOR PROCEDURE (OUTPATIENT)
Dept: ORTHOPEDIC SURGERY | Age: 60
End: 2025-01-10

## 2025-01-10 DIAGNOSIS — Z01.818 PRE-OP EVALUATION: Primary | ICD-10-CM

## 2025-01-10 RX ORDER — SODIUM CHLORIDE 0.9 % (FLUSH) 0.9 %
5-40 SYRINGE (ML) INJECTION PRN
Status: CANCELLED | OUTPATIENT
Start: 2025-01-10

## 2025-01-10 RX ORDER — SODIUM CHLORIDE 0.9 % (FLUSH) 0.9 %
5-40 SYRINGE (ML) INJECTION EVERY 12 HOURS SCHEDULED
Status: CANCELLED | OUTPATIENT
Start: 2025-01-10

## 2025-01-10 RX ORDER — SODIUM CHLORIDE 9 MG/ML
INJECTION, SOLUTION INTRAVENOUS PRN
Status: CANCELLED | OUTPATIENT
Start: 2025-01-10

## 2025-01-11 LAB
BACTERIA SPEC CULT: NORMAL
SERVICE CMNT-IMP: NORMAL

## 2025-01-13 ENCOUNTER — ANESTHESIA EVENT (OUTPATIENT)
Dept: SURGERY | Age: 60
End: 2025-01-13
Payer: COMMERCIAL

## 2025-01-13 ENCOUNTER — ANESTHESIA (OUTPATIENT)
Dept: SURGERY | Age: 60
End: 2025-01-13
Payer: COMMERCIAL

## 2025-01-13 ENCOUNTER — HOSPITAL ENCOUNTER (INPATIENT)
Age: 60
LOS: 7 days | Discharge: NURSING, REHAB OR LONG-TERM CARE | DRG: 502 | End: 2025-01-22
Attending: ORTHOPAEDIC SURGERY | Admitting: ORTHOPAEDIC SURGERY
Payer: COMMERCIAL

## 2025-01-13 DIAGNOSIS — G89.18 POSTOPERATIVE PAIN: Primary | ICD-10-CM

## 2025-01-13 PROBLEM — M00.9 INFECTION OF LEFT KNEE: Status: ACTIVE | Noted: 2025-01-13

## 2025-01-13 LAB
APPEARANCE FLD: NORMAL
COLOR FLD: NORMAL
GLUCOSE BLD STRIP.AUTO-MCNC: 158 MG/DL (ref 65–100)
GLUCOSE BLD STRIP.AUTO-MCNC: 292 MG/DL (ref 65–100)
LYMPHOCYTES NFR BRONCH MANUAL: 5 %
NEUTROPHILS NFR BRONCH MANUAL: 95 %
NUC CELL # FLD: 3676 /CU MM
RBC # FLD: NORMAL /CU MM
SERVICE CMNT-IMP: ABNORMAL
SERVICE CMNT-IMP: ABNORMAL
SPECIMEN SOURCE FLD: NORMAL

## 2025-01-13 PROCEDURE — 87102 FUNGUS ISOLATION CULTURE: CPT

## 2025-01-13 PROCEDURE — 97530 THERAPEUTIC ACTIVITIES: CPT

## 2025-01-13 PROCEDURE — 0JDP0ZZ EXTRACTION OF LEFT LOWER LEG SUBCUTANEOUS TISSUE AND FASCIA, OPEN APPROACH: ICD-10-PCS | Performed by: ORTHOPAEDIC SURGERY

## 2025-01-13 PROCEDURE — 3700000001 HC ADD 15 MINUTES (ANESTHESIA): Performed by: ORTHOPAEDIC SURGERY

## 2025-01-13 PROCEDURE — 6370000000 HC RX 637 (ALT 250 FOR IP): Performed by: ANESTHESIOLOGY

## 2025-01-13 PROCEDURE — 2500000003 HC RX 250 WO HCPCS: Performed by: NURSE PRACTITIONER

## 2025-01-13 PROCEDURE — 97165 OT EVAL LOW COMPLEX 30 MIN: CPT

## 2025-01-13 PROCEDURE — 6360000002 HC RX W HCPCS: Performed by: ORTHOPAEDIC SURGERY

## 2025-01-13 PROCEDURE — 87075 CULTR BACTERIA EXCEPT BLOOD: CPT

## 2025-01-13 PROCEDURE — 2720000010 HC SURG SUPPLY STERILE: Performed by: ORTHOPAEDIC SURGERY

## 2025-01-13 PROCEDURE — 82962 GLUCOSE BLOOD TEST: CPT

## 2025-01-13 PROCEDURE — 3600000004 HC SURGERY LEVEL 4 BASE: Performed by: ORTHOPAEDIC SURGERY

## 2025-01-13 PROCEDURE — 2500000003 HC RX 250 WO HCPCS: Performed by: NURSE ANESTHETIST, CERTIFIED REGISTERED

## 2025-01-13 PROCEDURE — 64447 NJX AA&/STRD FEMORAL NRV IMG: CPT | Performed by: ANESTHESIOLOGY

## 2025-01-13 PROCEDURE — 87070 CULTURE OTHR SPECIMN AEROBIC: CPT

## 2025-01-13 PROCEDURE — 87206 SMEAR FLUORESCENT/ACID STAI: CPT

## 2025-01-13 PROCEDURE — 6360000002 HC RX W HCPCS: Performed by: NURSE PRACTITIONER

## 2025-01-13 PROCEDURE — 87205 SMEAR GRAM STAIN: CPT

## 2025-01-13 PROCEDURE — 2709999900 HC NON-CHARGEABLE SUPPLY: Performed by: ORTHOPAEDIC SURGERY

## 2025-01-13 PROCEDURE — 6360000002 HC RX W HCPCS: Performed by: ANESTHESIOLOGY

## 2025-01-13 PROCEDURE — 94761 N-INVAS EAR/PLS OXIMETRY MLT: CPT

## 2025-01-13 PROCEDURE — 87186 SC STD MICRODIL/AGAR DIL: CPT

## 2025-01-13 PROCEDURE — 94760 N-INVAS EAR/PLS OXIMETRY 1: CPT

## 2025-01-13 PROCEDURE — 6370000000 HC RX 637 (ALT 250 FOR IP): Performed by: NURSE PRACTITIONER

## 2025-01-13 PROCEDURE — 2580000003 HC RX 258: Performed by: NURSE ANESTHETIST, CERTIFIED REGISTERED

## 2025-01-13 PROCEDURE — 7100000000 HC PACU RECOVERY - FIRST 15 MIN: Performed by: ORTHOPAEDIC SURGERY

## 2025-01-13 PROCEDURE — 3E1U38Z IRRIGATION OF JOINTS USING IRRIGATING SUBSTANCE, PERCUTANEOUS APPROACH: ICD-10-PCS | Performed by: ORTHOPAEDIC SURGERY

## 2025-01-13 PROCEDURE — 6360000002 HC RX W HCPCS: Performed by: INTERNAL MEDICINE

## 2025-01-13 PROCEDURE — 97161 PT EVAL LOW COMPLEX 20 MIN: CPT

## 2025-01-13 PROCEDURE — 2580000003 HC RX 258: Performed by: ORTHOPAEDIC SURGERY

## 2025-01-13 PROCEDURE — 89050 BODY FLUID CELL COUNT: CPT

## 2025-01-13 PROCEDURE — 7100000001 HC PACU RECOVERY - ADDTL 15 MIN: Performed by: ORTHOPAEDIC SURGERY

## 2025-01-13 PROCEDURE — 3600000014 HC SURGERY LEVEL 4 ADDTL 15MIN: Performed by: ORTHOPAEDIC SURGERY

## 2025-01-13 PROCEDURE — 6360000002 HC RX W HCPCS: Performed by: NURSE ANESTHETIST, CERTIFIED REGISTERED

## 2025-01-13 PROCEDURE — 27310 EXPLORATION OF KNEE JOINT: CPT | Performed by: ORTHOPAEDIC SURGERY

## 2025-01-13 PROCEDURE — 3700000000 HC ANESTHESIA ATTENDED CARE: Performed by: ORTHOPAEDIC SURGERY

## 2025-01-13 PROCEDURE — 2500000003 HC RX 250 WO HCPCS: Performed by: INTERNAL MEDICINE

## 2025-01-13 RX ORDER — FENTANYL CITRATE 50 UG/ML
100 INJECTION, SOLUTION INTRAMUSCULAR; INTRAVENOUS
Status: COMPLETED | OUTPATIENT
Start: 2025-01-13 | End: 2025-01-13

## 2025-01-13 RX ORDER — DULOXETIN HYDROCHLORIDE 60 MG/1
60 CAPSULE, DELAYED RELEASE ORAL 2 TIMES DAILY
Status: DISCONTINUED | OUTPATIENT
Start: 2025-01-13 | End: 2025-01-22 | Stop reason: HOSPADM

## 2025-01-13 RX ORDER — SODIUM CHLORIDE 0.9 % (FLUSH) 0.9 %
5-40 SYRINGE (ML) INJECTION PRN
Status: DISCONTINUED | OUTPATIENT
Start: 2025-01-13 | End: 2025-01-13 | Stop reason: HOSPADM

## 2025-01-13 RX ORDER — EPHEDRINE SULFATE 5 MG/ML
INJECTION INTRAVENOUS
Status: DISCONTINUED | OUTPATIENT
Start: 2025-01-13 | End: 2025-01-13 | Stop reason: SDUPTHER

## 2025-01-13 RX ORDER — PANTOPRAZOLE SODIUM 40 MG/1
40 TABLET, DELAYED RELEASE ORAL
Status: DISCONTINUED | OUTPATIENT
Start: 2025-01-14 | End: 2025-01-22 | Stop reason: HOSPADM

## 2025-01-13 RX ORDER — SODIUM CHLORIDE, SODIUM LACTATE, POTASSIUM CHLORIDE, CALCIUM CHLORIDE 600; 310; 30; 20 MG/100ML; MG/100ML; MG/100ML; MG/100ML
INJECTION, SOLUTION INTRAVENOUS
Status: DISCONTINUED | OUTPATIENT
Start: 2025-01-13 | End: 2025-01-13 | Stop reason: SDUPTHER

## 2025-01-13 RX ORDER — SODIUM CHLORIDE 9 MG/ML
INJECTION, SOLUTION INTRAVENOUS PRN
Status: DISCONTINUED | OUTPATIENT
Start: 2025-01-13 | End: 2025-01-13 | Stop reason: HOSPADM

## 2025-01-13 RX ORDER — FENTANYL CITRATE 50 UG/ML
INJECTION, SOLUTION INTRAMUSCULAR; INTRAVENOUS
Status: DISCONTINUED | OUTPATIENT
Start: 2025-01-13 | End: 2025-01-13 | Stop reason: SDUPTHER

## 2025-01-13 RX ORDER — ALOGLIPTIN 12.5 MG/1
12.5 TABLET, FILM COATED ORAL DAILY
Status: DISCONTINUED | OUTPATIENT
Start: 2025-01-14 | End: 2025-01-22 | Stop reason: HOSPADM

## 2025-01-13 RX ORDER — OXYCODONE HYDROCHLORIDE 5 MG/1
5 TABLET ORAL EVERY 4 HOURS PRN
Status: DISCONTINUED | OUTPATIENT
Start: 2025-01-13 | End: 2025-01-22 | Stop reason: HOSPADM

## 2025-01-13 RX ORDER — LIDOCAINE HYDROCHLORIDE 20 MG/ML
INJECTION, SOLUTION EPIDURAL; INFILTRATION; INTRACAUDAL; PERINEURAL
Status: DISCONTINUED | OUTPATIENT
Start: 2025-01-13 | End: 2025-01-13 | Stop reason: SDUPTHER

## 2025-01-13 RX ORDER — DEXTROSE MONOHYDRATE 100 MG/ML
INJECTION, SOLUTION INTRAVENOUS CONTINUOUS PRN
Status: DISCONTINUED | OUTPATIENT
Start: 2025-01-13 | End: 2025-01-22 | Stop reason: HOSPADM

## 2025-01-13 RX ORDER — OXYCODONE HYDROCHLORIDE 5 MG/1
10 TABLET ORAL EVERY 4 HOURS PRN
Qty: 60 TABLET | Refills: 0 | Status: ON HOLD | OUTPATIENT
Start: 2025-01-13 | End: 2025-01-20

## 2025-01-13 RX ORDER — ACETAMINOPHEN 500 MG
1000 TABLET ORAL EVERY 6 HOURS
Status: DISCONTINUED | OUTPATIENT
Start: 2025-01-13 | End: 2025-01-22 | Stop reason: HOSPADM

## 2025-01-13 RX ORDER — IPRATROPIUM BROMIDE AND ALBUTEROL SULFATE 2.5; .5 MG/3ML; MG/3ML
1 SOLUTION RESPIRATORY (INHALATION)
Status: DISCONTINUED | OUTPATIENT
Start: 2025-01-13 | End: 2025-01-13 | Stop reason: HOSPADM

## 2025-01-13 RX ORDER — KETOROLAC TROMETHAMINE 30 MG/ML
INJECTION, SOLUTION INTRAMUSCULAR; INTRAVENOUS
Status: DISCONTINUED | OUTPATIENT
Start: 2025-01-13 | End: 2025-01-13 | Stop reason: SDUPTHER

## 2025-01-13 RX ORDER — SODIUM CHLORIDE 0.9 % (FLUSH) 0.9 %
5-40 SYRINGE (ML) INJECTION EVERY 12 HOURS SCHEDULED
Status: DISCONTINUED | OUTPATIENT
Start: 2025-01-13 | End: 2025-01-13 | Stop reason: HOSPADM

## 2025-01-13 RX ORDER — METOPROLOL TARTRATE 50 MG
50 TABLET ORAL 2 TIMES DAILY
Status: DISCONTINUED | OUTPATIENT
Start: 2025-01-13 | End: 2025-01-22 | Stop reason: HOSPADM

## 2025-01-13 RX ORDER — ONDANSETRON 4 MG/1
8 TABLET, ORALLY DISINTEGRATING ORAL EVERY 8 HOURS PRN
Status: DISCONTINUED | OUTPATIENT
Start: 2025-01-13 | End: 2025-01-13

## 2025-01-13 RX ORDER — TIZANIDINE 2 MG/1
4 TABLET ORAL 3 TIMES DAILY PRN
Status: DISCONTINUED | OUTPATIENT
Start: 2025-01-13 | End: 2025-01-22 | Stop reason: HOSPADM

## 2025-01-13 RX ORDER — OXYCODONE HYDROCHLORIDE 5 MG/1
5 TABLET ORAL
Status: DISCONTINUED | OUTPATIENT
Start: 2025-01-13 | End: 2025-01-13 | Stop reason: HOSPADM

## 2025-01-13 RX ORDER — DIPHENHYDRAMINE HCL 25 MG
25 CAPSULE ORAL EVERY 6 HOURS PRN
Status: DISCONTINUED | OUTPATIENT
Start: 2025-01-13 | End: 2025-01-22 | Stop reason: HOSPADM

## 2025-01-13 RX ORDER — INSULIN LISPRO 100 [IU]/ML
0-4 INJECTION, SOLUTION INTRAVENOUS; SUBCUTANEOUS
Status: DISCONTINUED | OUTPATIENT
Start: 2025-01-13 | End: 2025-01-22 | Stop reason: HOSPADM

## 2025-01-13 RX ORDER — SODIUM CHLORIDE 0.9 % (FLUSH) 0.9 %
5-40 SYRINGE (ML) INJECTION PRN
Status: DISCONTINUED | OUTPATIENT
Start: 2025-01-13 | End: 2025-01-22 | Stop reason: HOSPADM

## 2025-01-13 RX ORDER — DIPHENHYDRAMINE HYDROCHLORIDE 50 MG/ML
25 INJECTION INTRAMUSCULAR; INTRAVENOUS EVERY 6 HOURS PRN
Status: DISCONTINUED | OUTPATIENT
Start: 2025-01-13 | End: 2025-01-22 | Stop reason: HOSPADM

## 2025-01-13 RX ORDER — LIDOCAINE HYDROCHLORIDE 10 MG/ML
1 INJECTION, SOLUTION INFILTRATION; PERINEURAL
Status: DISCONTINUED | OUTPATIENT
Start: 2025-01-13 | End: 2025-01-13 | Stop reason: HOSPADM

## 2025-01-13 RX ORDER — SODIUM CHLORIDE 0.9 % (FLUSH) 0.9 %
5-40 SYRINGE (ML) INJECTION PRN
Status: DISCONTINUED | OUTPATIENT
Start: 2025-01-13 | End: 2025-01-13 | Stop reason: SDUPTHER

## 2025-01-13 RX ORDER — DEXAMETHASONE SODIUM PHOSPHATE 10 MG/ML
INJECTION INTRAMUSCULAR; INTRAVENOUS
Status: DISCONTINUED | OUTPATIENT
Start: 2025-01-13 | End: 2025-01-13 | Stop reason: SDUPTHER

## 2025-01-13 RX ORDER — ONDANSETRON 4 MG/1
4 TABLET, ORALLY DISINTEGRATING ORAL EVERY 8 HOURS PRN
Status: DISCONTINUED | OUTPATIENT
Start: 2025-01-13 | End: 2025-01-22 | Stop reason: HOSPADM

## 2025-01-13 RX ORDER — MIDAZOLAM HYDROCHLORIDE 2 MG/2ML
2 INJECTION, SOLUTION INTRAMUSCULAR; INTRAVENOUS
Status: COMPLETED | OUTPATIENT
Start: 2025-01-13 | End: 2025-01-13

## 2025-01-13 RX ORDER — NALOXONE HYDROCHLORIDE 0.4 MG/ML
0.4 INJECTION, SOLUTION INTRAMUSCULAR; INTRAVENOUS; SUBCUTANEOUS PRN
Status: DISCONTINUED | OUTPATIENT
Start: 2025-01-13 | End: 2025-01-22 | Stop reason: HOSPADM

## 2025-01-13 RX ORDER — ONDANSETRON 4 MG/1
4 TABLET, FILM COATED ORAL 3 TIMES DAILY PRN
Qty: 30 TABLET | Refills: 1 | Status: ON HOLD | OUTPATIENT
Start: 2025-01-13

## 2025-01-13 RX ORDER — NALOXONE HYDROCHLORIDE 0.4 MG/ML
INJECTION, SOLUTION INTRAMUSCULAR; INTRAVENOUS; SUBCUTANEOUS PRN
Status: DISCONTINUED | OUTPATIENT
Start: 2025-01-13 | End: 2025-01-13 | Stop reason: HOSPADM

## 2025-01-13 RX ORDER — SODIUM CHLORIDE 9 MG/ML
INJECTION, SOLUTION INTRAVENOUS PRN
Status: DISCONTINUED | OUTPATIENT
Start: 2025-01-13 | End: 2025-01-22 | Stop reason: HOSPADM

## 2025-01-13 RX ORDER — ACETAMINOPHEN 500 MG
1000 TABLET ORAL EVERY 6 HOURS PRN
Qty: 180 TABLET | Refills: 2 | Status: ON HOLD | OUTPATIENT
Start: 2025-01-13

## 2025-01-13 RX ORDER — SODIUM CHLORIDE 0.9 % (FLUSH) 0.9 %
5-40 SYRINGE (ML) INJECTION EVERY 12 HOURS SCHEDULED
Status: DISCONTINUED | OUTPATIENT
Start: 2025-01-13 | End: 2025-01-13 | Stop reason: SDUPTHER

## 2025-01-13 RX ORDER — HYDROXYZINE HYDROCHLORIDE 25 MG/1
25 TABLET, FILM COATED ORAL EVERY 8 HOURS PRN
Status: DISCONTINUED | OUTPATIENT
Start: 2025-01-13 | End: 2025-01-22 | Stop reason: HOSPADM

## 2025-01-13 RX ORDER — GABAPENTIN 300 MG/1
600 CAPSULE ORAL 2 TIMES DAILY
Status: DISCONTINUED | OUTPATIENT
Start: 2025-01-13 | End: 2025-01-22 | Stop reason: HOSPADM

## 2025-01-13 RX ORDER — OMEPRAZOLE 40 MG/1
40 CAPSULE, DELAYED RELEASE ORAL DAILY
Qty: 30 CAPSULE | Refills: 0 | Status: ON HOLD | OUTPATIENT
Start: 2025-01-13

## 2025-01-13 RX ORDER — ONDANSETRON 2 MG/ML
4 INJECTION INTRAMUSCULAR; INTRAVENOUS EVERY 6 HOURS PRN
Status: DISCONTINUED | OUTPATIENT
Start: 2025-01-13 | End: 2025-01-22 | Stop reason: HOSPADM

## 2025-01-13 RX ORDER — ASPIRIN 81 MG/1
81 TABLET ORAL 2 TIMES DAILY
Status: COMPLETED | OUTPATIENT
Start: 2025-01-13 | End: 2025-01-13

## 2025-01-13 RX ORDER — HYDROMORPHONE HYDROCHLORIDE 1 MG/ML
1 INJECTION, SOLUTION INTRAMUSCULAR; INTRAVENOUS; SUBCUTANEOUS
Status: DISCONTINUED | OUTPATIENT
Start: 2025-01-13 | End: 2025-01-22 | Stop reason: HOSPADM

## 2025-01-13 RX ORDER — PROPOFOL 10 MG/ML
INJECTION, EMULSION INTRAVENOUS
Status: DISCONTINUED | OUTPATIENT
Start: 2025-01-13 | End: 2025-01-13 | Stop reason: SDUPTHER

## 2025-01-13 RX ORDER — VANCOMYCIN HYDROCHLORIDE 1 G/20ML
INJECTION, POWDER, LYOPHILIZED, FOR SOLUTION INTRAVENOUS PRN
Status: DISCONTINUED | OUTPATIENT
Start: 2025-01-13 | End: 2025-01-13 | Stop reason: HOSPADM

## 2025-01-13 RX ORDER — MELOXICAM 7.5 MG/1
7.5 TABLET ORAL 2 TIMES DAILY
Status: DISCONTINUED | OUTPATIENT
Start: 2025-01-13 | End: 2025-01-22 | Stop reason: HOSPADM

## 2025-01-13 RX ORDER — SODIUM CHLORIDE 0.9 % (FLUSH) 0.9 %
5-40 SYRINGE (ML) INJECTION EVERY 12 HOURS SCHEDULED
Status: DISCONTINUED | OUTPATIENT
Start: 2025-01-13 | End: 2025-01-22 | Stop reason: HOSPADM

## 2025-01-13 RX ORDER — HALOPERIDOL 5 MG/ML
1 INJECTION INTRAMUSCULAR
Status: DISCONTINUED | OUTPATIENT
Start: 2025-01-13 | End: 2025-01-13 | Stop reason: HOSPADM

## 2025-01-13 RX ORDER — TOBRAMYCIN 1.2 G/30ML
INJECTION, POWDER, LYOPHILIZED, FOR SOLUTION INTRAVENOUS PRN
Status: DISCONTINUED | OUTPATIENT
Start: 2025-01-13 | End: 2025-01-13 | Stop reason: HOSPADM

## 2025-01-13 RX ORDER — SENNA AND DOCUSATE SODIUM 50; 8.6 MG/1; MG/1
1 TABLET, FILM COATED ORAL 2 TIMES DAILY
Status: DISCONTINUED | OUTPATIENT
Start: 2025-01-13 | End: 2025-01-22 | Stop reason: HOSPADM

## 2025-01-13 RX ORDER — ACETAMINOPHEN 500 MG
1000 TABLET ORAL ONCE
Status: COMPLETED | OUTPATIENT
Start: 2025-01-13 | End: 2025-01-13

## 2025-01-13 RX ORDER — PROCHLORPERAZINE EDISYLATE 5 MG/ML
5 INJECTION INTRAMUSCULAR; INTRAVENOUS
Status: DISCONTINUED | OUTPATIENT
Start: 2025-01-13 | End: 2025-01-13 | Stop reason: HOSPADM

## 2025-01-13 RX ORDER — ONDANSETRON 2 MG/ML
INJECTION INTRAMUSCULAR; INTRAVENOUS
Status: DISCONTINUED | OUTPATIENT
Start: 2025-01-13 | End: 2025-01-13 | Stop reason: SDUPTHER

## 2025-01-13 RX ORDER — DEXAMETHASONE SODIUM PHOSPHATE 10 MG/ML
INJECTION, SOLUTION INTRAMUSCULAR; INTRAVENOUS
Status: COMPLETED | OUTPATIENT
Start: 2025-01-13 | End: 2025-01-13

## 2025-01-13 RX ORDER — SENNOSIDES A AND B 8.6 MG/1
1 TABLET, FILM COATED ORAL 2 TIMES DAILY
Qty: 30 TABLET | Refills: 2 | Status: ON HOLD | OUTPATIENT
Start: 2025-01-13

## 2025-01-13 RX ORDER — SODIUM CHLORIDE 9 MG/ML
INJECTION, SOLUTION INTRAVENOUS CONTINUOUS
Status: DISCONTINUED | OUTPATIENT
Start: 2025-01-13 | End: 2025-01-15

## 2025-01-13 RX ORDER — OXYCODONE HYDROCHLORIDE 5 MG/1
10 TABLET ORAL EVERY 4 HOURS PRN
Status: DISCONTINUED | OUTPATIENT
Start: 2025-01-13 | End: 2025-01-22 | Stop reason: HOSPADM

## 2025-01-13 RX ORDER — TRANEXAMIC ACID 650 MG/1
1300 TABLET ORAL
Status: COMPLETED | OUTPATIENT
Start: 2025-01-13 | End: 2025-01-13

## 2025-01-13 RX ORDER — IBUPROFEN 600 MG/1
1 TABLET ORAL PRN
Status: DISCONTINUED | OUTPATIENT
Start: 2025-01-13 | End: 2025-01-22 | Stop reason: HOSPADM

## 2025-01-13 RX ADMIN — METOPROLOL TARTRATE 50 MG: 50 TABLET, FILM COATED ORAL at 20:41

## 2025-01-13 RX ADMIN — SODIUM CHLORIDE, SODIUM LACTATE, POTASSIUM CHLORIDE, AND CALCIUM CHLORIDE: 600; 310; 30; 20 INJECTION, SOLUTION INTRAVENOUS at 11:59

## 2025-01-13 RX ADMIN — ACETAMINOPHEN 1000 MG: 500 TABLET, FILM COATED ORAL at 11:31

## 2025-01-13 RX ADMIN — ONDANSETRON 4 MG: 2 INJECTION, SOLUTION INTRAMUSCULAR; INTRAVENOUS at 12:34

## 2025-01-13 RX ADMIN — KETOROLAC TROMETHAMINE 30 MG: 30 INJECTION, SOLUTION INTRAMUSCULAR; INTRAVENOUS at 13:04

## 2025-01-13 RX ADMIN — OXYCODONE 10 MG: 5 TABLET ORAL at 20:40

## 2025-01-13 RX ADMIN — MIDAZOLAM 2 MG: 1 INJECTION INTRAMUSCULAR; INTRAVENOUS at 11:47

## 2025-01-13 RX ADMIN — EPHEDRINE SULFATE 10 MG: 5 INJECTION INTRAVENOUS at 12:22

## 2025-01-13 RX ADMIN — FENTANYL CITRATE 50 MCG: 50 INJECTION, SOLUTION INTRAMUSCULAR; INTRAVENOUS at 12:54

## 2025-01-13 RX ADMIN — GABAPENTIN 600 MG: 300 CAPSULE ORAL at 20:41

## 2025-01-13 RX ADMIN — LIDOCAINE HYDROCHLORIDE 80 MG: 20 INJECTION, SOLUTION EPIDURAL; INFILTRATION; INTRACAUDAL; PERINEURAL at 12:17

## 2025-01-13 RX ADMIN — DEXAMETHASONE SODIUM PHOSPHATE 4 MG: 10 INJECTION, SOLUTION INTRAMUSCULAR; INTRAVENOUS at 11:47

## 2025-01-13 RX ADMIN — EPHEDRINE SULFATE 10 MG: 5 INJECTION INTRAVENOUS at 12:39

## 2025-01-13 RX ADMIN — PHENYLEPHRINE HYDROCHLORIDE 100 MCG: 0.1 INJECTION, SOLUTION INTRAVENOUS at 12:24

## 2025-01-13 RX ADMIN — FENTANYL CITRATE 50 MCG: 50 INJECTION, SOLUTION INTRAMUSCULAR; INTRAVENOUS at 12:56

## 2025-01-13 RX ADMIN — PHENYLEPHRINE HYDROCHLORIDE 100 MCG: 0.1 INJECTION, SOLUTION INTRAVENOUS at 12:22

## 2025-01-13 RX ADMIN — DEXAMETHASONE SODIUM PHOSPHATE 4 MG: 10 INJECTION INTRAMUSCULAR; INTRAVENOUS at 12:34

## 2025-01-13 RX ADMIN — LIDOCAINE HYDROCHLORIDE 20 MG: 20 INJECTION, SOLUTION EPIDURAL; INFILTRATION; INTRACAUDAL; PERINEURAL at 12:18

## 2025-01-13 RX ADMIN — FENTANYL CITRATE 50 MCG: 50 INJECTION INTRAMUSCULAR; INTRAVENOUS at 11:47

## 2025-01-13 RX ADMIN — PHENYLEPHRINE HYDROCHLORIDE 100 MCG: 0.1 INJECTION, SOLUTION INTRAVENOUS at 12:39

## 2025-01-13 RX ADMIN — CEFEPIME 2000 MG: 2 INJECTION, POWDER, FOR SOLUTION INTRAVENOUS at 20:41

## 2025-01-13 RX ADMIN — MELOXICAM 7.5 MG: 7.5 TABLET ORAL at 20:41

## 2025-01-13 RX ADMIN — ROPIVACAINE HYDROCHLORIDE 20 ML: 5 INJECTION, SOLUTION EPIDURAL; INFILTRATION; PERINEURAL at 11:47

## 2025-01-13 RX ADMIN — VANCOMYCIN HYDROCHLORIDE 1500 MG: 10 INJECTION, POWDER, LYOPHILIZED, FOR SOLUTION INTRAVENOUS at 12:56

## 2025-01-13 RX ADMIN — TRANEXAMIC ACID 1300 MG: 650 TABLET ORAL at 17:42

## 2025-01-13 RX ADMIN — TRANEXAMIC ACID 1300 MG: 650 TABLET ORAL at 20:41

## 2025-01-13 RX ADMIN — PROPOFOL 300 MG: 10 INJECTION, EMULSION INTRAVENOUS at 12:17

## 2025-01-13 RX ADMIN — ACETAMINOPHEN 1000 MG: 500 TABLET, FILM COATED ORAL at 17:42

## 2025-01-13 RX ADMIN — METFORMIN HYDROCHLORIDE 500 MG: 500 TABLET ORAL at 20:41

## 2025-01-13 RX ADMIN — EPHEDRINE SULFATE 10 MG: 5 INJECTION INTRAVENOUS at 12:24

## 2025-01-13 RX ADMIN — ASPIRIN 81 MG: 81 TABLET, COATED ORAL at 20:41

## 2025-01-13 RX ADMIN — SENNOSIDES AND DOCUSATE SODIUM 1 TABLET: 8.6; 5 TABLET ORAL at 20:41

## 2025-01-13 RX ADMIN — CEFAZOLIN 2000 MG: 2 INJECTION, POWDER, FOR SOLUTION INTRAMUSCULAR; INTRAVENOUS at 12:56

## 2025-01-13 ASSESSMENT — PAIN DESCRIPTION - LOCATION
LOCATION: KNEE
LOCATION: KNEE

## 2025-01-13 ASSESSMENT — PAIN DESCRIPTION - ONSET: ONSET: PROGRESSIVE

## 2025-01-13 ASSESSMENT — PAIN DESCRIPTION - PAIN TYPE: TYPE: ACUTE PAIN;SURGICAL PAIN

## 2025-01-13 ASSESSMENT — PAIN DESCRIPTION - ORIENTATION
ORIENTATION: LEFT
ORIENTATION: LEFT

## 2025-01-13 ASSESSMENT — PAIN DESCRIPTION - DESCRIPTORS
DESCRIPTORS: THROBBING
DESCRIPTORS: ACHING

## 2025-01-13 ASSESSMENT — PAIN SCALES - GENERAL
PAINLEVEL_OUTOF10: 7
PAINLEVEL_OUTOF10: 0
PAINLEVEL_OUTOF10: 0
PAINLEVEL_OUTOF10: 3

## 2025-01-13 ASSESSMENT — PAIN - FUNCTIONAL ASSESSMENT
PAIN_FUNCTIONAL_ASSESSMENT: 0-10
PAIN_FUNCTIONAL_ASSESSMENT: ACTIVITIES ARE NOT PREVENTED

## 2025-01-13 ASSESSMENT — PAIN DESCRIPTION - FREQUENCY: FREQUENCY: CONTINUOUS

## 2025-01-13 NOTE — ANESTHESIA POSTPROCEDURE EVALUATION
Department of Anesthesiology  Postprocedure Note    Patient: Maricel Gallego  MRN: [id number]  YOB: 1965  Date of evaluation: 1/13/2025    Procedure Summary       Date: 01/13/25 [place] [facility] [place] [facility] [place]  / [facility] [place]    Anesthesia Start: 1159 Anesthesia Stop: 1334    Procedure: Left LEG DEBRIDEMENT INCISION AND DRAINAGE (Left: Knee) Diagnosis:       Bacterial infection of knee joint      (Bacterial infection of knee joint [M00.869])    Surgeons: Bharat Lindo MD Responsible Provider: Rosalio Bob MD    Anesthesia Type: general ASA Status: 3            Anesthesia Type: No value filed.    Jluis Phase I: Jluis Score: 10    Jluis Phase II:      Anesthesia Post Evaluation    Patient location during evaluation: PACU  Patient participation: complete - patient participated  Level of consciousness: awake and alert  Airway patency: patent  Nausea & Vomiting: no nausea  Cardiovascular status: hemodynamically stable  Respiratory status: acceptable  Hydration status: euvolemic  Pain management: adequate and satisfactory to patient        No notable events documented.

## 2025-01-13 NOTE — ANESTHESIA PRE PROCEDURE
use: Not Currently     Comment: Occasionally                                Counseling given: Not Answered      Vital Signs (Current):   Vitals:    01/13/25 1039   BP: 120/78   Pulse: (!) 131   Resp: 18   Temp: 97.3 °F (36.3 °C)   TempSrc: Temporal   SpO2: 96%   Weight: 104.3 kg (230 lb)   Height: 1.702 m (5' 7\")                                              BP Readings from Last 3 Encounters:   01/13/25 120/78   01/07/25 120/80   12/04/24 108/78       NPO Status: Time of last liquid consumption: 0800 (32 oz half water, half cranberry juice)                        Time of last solid consumption: 2000                        Date of last liquid consumption: 01/13/25                        Date of last solid food consumption: 01/12/25    BMI:   Wt Readings from Last 3 Encounters:   01/13/25 104.3 kg (230 lb)   01/07/25 108.9 kg (240 lb)   12/04/24 117.9 kg (260 lb)     Body mass index is 36.02 kg/m².    CBC:   Lab Results   Component Value Date/Time    WBC 13.5 12/04/2024 02:31 PM    RBC 4.93 12/04/2024 02:31 PM    HGB 10.7 01/09/2025 11:06 AM    HCT 39.4 12/04/2024 02:31 PM    MCV 79.9 12/04/2024 02:31 PM    RDW 16.4 12/04/2024 02:31 PM     12/04/2024 02:31 PM       CMP:   Lab Results   Component Value Date/Time     12/04/2024 02:31 PM    K 4.3 12/04/2024 02:31 PM    CL 93 12/04/2024 02:31 PM    CO2 21 12/04/2024 02:31 PM    BUN 12 12/04/2024 02:31 PM    CREATININE 0.72 12/04/2024 02:31 PM    GFRAA >60 07/18/2022 11:58 AM    AGRATIO 1.1 03/09/2021 10:44 AM    LABGLOM >90 12/04/2024 02:31 PM    LABGLOM 84 04/01/2024 12:13 PM    GLUCOSE 126 12/04/2024 02:31 PM    CALCIUM 9.5 12/04/2024 02:31 PM    BILITOT 0.6 12/04/2024 02:31 PM    ALKPHOS 191 12/04/2024 02:31 PM    ALKPHOS 97 03/09/2021 10:44 AM    AST 30 12/04/2024 02:31 PM    ALT 13 12/04/2024 02:31 PM       POC Tests: No results for input(s): \"POCGLU\", \"POCNA\", \"POCK\", \"POCCL\", \"POCBUN\", \"POCHEMO\", \"POCHCT\" in the last 72 hours.    Coags: No results

## 2025-01-13 NOTE — PERIOP NOTE
TRANSFER - OUT REPORT:    Verbal report given to BETTE Bailey on Maricel Gallego  being transferred to [facility] for routine post-op       Report consisted of patient's Situation, Background, Assessment and   Recommendations(SBAR).     Information from the following report(s) Nurse Handoff Report and Cardiac Rhythm SR  was reviewed with the receiving nurse.           Lines:   Peripheral IV 01/13/25 Left;Posterior Wrist (Active)   Site Assessment Clean, dry & intact 01/13/25 1356   Line Status Infusing 01/13/25 1356   Phlebitis Assessment No symptoms 01/13/25 1356   Infiltration Assessment 0 01/13/25 1356   Alcohol Cap Used No 01/13/25 1356   Dressing Status Clean, dry & intact 01/13/25 1356   Dressing Type Transparent 01/13/25 1356        Opportunity for questions and clarification was provided.      Patient transported with:  O2 @ 2lpm

## 2025-01-13 NOTE — H&P
Patient ID:  Maricel Gallego  [id number]  59 y.o.  1965    Today: January 13, 2025          CC:  Left  Knee pain    HPI:   The patient has septic arthritis of left knee. Cultures taken previously in office which show enterobacter growing.    Past Medical History:  Past Medical History:   Diagnosis Date    Atherosclerosis of arteries 03/18/2024    Scattered Throughout Abdominal Vasculature per CT Abdomen/Pelvis    Bacterial infection of knee joint     Contact dermatitis 7/21/2021    COVID-19 12/2019    Diabetes ([facility])     Type 2, managed with oral medication. Does not take surgar daily. No problems with hypoglycemia. Patient reports average BS low 100's    Diabetic polyneuropathy ([facility])     DVT (deep venous thrombosis) ([facility])     Hx-right leg, on Eliquis, Followed by PCP    Elevated C-reactive protein     GERD (gastroesophageal reflux disease)     managed with medication    [place]'s disease     Hypercholesterolemia     Hypertension     managed with medication    Microcytic hypochromic anemia     Overactive bladder     Panic attacks     PRN medication    Situational mixed anxiety and depressive disorder     managed with medication    Splenic artery aneurysm ([facility]) 03/18/2024    2.5 cm    Vitamin D deficiency        Past Surgical History:  Past Surgical History:   Procedure Laterality Date    COLONOSCOPY  09/26/2019    8 mm Sessile Cecal Polyp, Moderately Tortuous Colon, Multiple Small Mouthed Diverticula in Sigmoid Colon, Small Internal Hemorrhoids    COLONOSCOPY W/ BIOPSIES  06/13/2024    Scattered Small Mouthed Diverticula in Sigmoid Colon, Internal Hemorrhoids, Tortuous Colon - Recall 2031    HYSTERECTOMY, TOTAL ABDOMINAL (CERVIX REMOVED)  04/01/1992    LASER RESURFACING      x 5 - Birthmark on Face    LEG SURGERY Left     possible ORIF tibia ~ 1998    UPPER GASTROINTESTINAL ENDOSCOPY  09/26/2019    1 cm Hiatal Hernia, Diffuse Mild Inflammation    UPPER GASTROINTESTINAL ENDOSCOPY  06/13/2024    1 cm Hiatal

## 2025-01-13 NOTE — CARE COORDINATION
Admitted for bacterial infection of knee joint with [place] today, 1/13/25. Assessment completed with patient at bedside. Sister-in-law Savanah present at time of assessment. Patient lives alone at address on file. Has caregivers from 9 am to 9 pm daily. She has been bedbound for the past 4 months. Before this time was completely independent per report. Went to [place] for SNF for two months about 7 weeks ago. Was not happy with experience there. Would not want to return. Patient has two sons that live in [place] and [place]. She lost her  a few months ago, states about a month before she got sick. She added her son in [place] as emergency contact. His name is Noe Gallego ([phone]). She is aware that infectious disease has been consulted and will make recommendations regarding antibiotic treatment. Discussed case management's role in coordinating discharge needs. Discussed fax out process for rehab if indicated including faxing out, receiving bed offers, choosing from bed offers available, and finally insurance authorization. Case management will continue to follow for therapy and ID recs to finalize discharge planning.        01/13/25 1529   Service Assessment   Patient Orientation Alert and Oriented;Person;Place;Situation   Cognition Alert   History Provided By Patient   Primary Caregiver Self   Accompanied By/Relationship sister in law   Support Systems Family Members   Patient's Healthcare Decision Maker is: Patient Declined (Legal Next of Kin Remains as Decision Maker)   PCP Verified by CM Yes   Prior Functional Level Assistance with the following:;Bathing;Dressing;Toileting;Feeding;Cooking;Housework;Shopping;Mobility   Current Functional Level Assistance with the following:;Bathing;Dressing;Toileting;Feeding;Cooking;Housework;Shopping;Mobility   Can patient return to prior living arrangement Yes   Ability to make needs known: Good   Family able to assist with home care needs: Yes   Would you like for

## 2025-01-13 NOTE — ANESTHESIA PROCEDURE NOTES
Peripheral Block    Patient location during procedure: pre-op  Reason for block: post-op pain management and at surgeon's request  Start time: 1/13/2025 11:47 AM  End time: 1/13/2025 11:48 AM  Staffing  Performed: anesthesiologist   Anesthesiologist: Rosalio Bob MD  Performed by: Rosalio Bob MD  Authorized by: Rosalio Bob MD    Preanesthetic Checklist  Completed: patient identified, IV checked, site marked, risks and benefits discussed, surgical/procedural consents, equipment checked, pre-op evaluation, timeout performed, anesthesia consent given, oxygen available and monitors applied/VS acknowledged  Peripheral Block   Patient position: supine  Prep: ChloraPrep  Provider prep: mask  Patient monitoring: cardiac monitor, continuous pulse ox, frequent blood pressure checks, IV access and oxygen  Block type: Femoral  Adductor canal  Laterality: left  Injection technique: single-shot  Guidance: ultrasound guided    Needle   Needle type: insulated echogenic nerve stimulator needle   Needle gauge: 21 G  Needle localization: ultrasound guidance  Needle length: 10 cm  Assessment   Injection assessment: negative aspiration for heme, no paresthesia on injection, local visualized surrounding nerve on ultrasound and no intravascular symptoms  Paresthesia pain: none  Slow fractionated injection: yes  Hemodynamics: stable  Outcomes: patient tolerated procedure well and uncomplicated    Additional Notes  -Block placed for post op pain at surgeon's request.     -Ultrasound used to identify anatomy of nerve bundle.    -Needle placement and local injection at perineural area confirmed with real time ultrasound guidance.     -Local visualized with ultrasound surrounding nerve.    -Permanent Image taken and placed on chart.      Medications Administered  dexAMETHasone (DECADRON) (PF) 10 mg/mL injection - Other   4 mg - 1/13/2025 11:47:00 AM  ROPivacaine 0.5% with EPINEPHrine 1:[id number] injection (ANESTHESIA USE ONLY) (Mixture 
Statement Selected

## 2025-01-13 NOTE — OP NOTE
[facility]  Total Knee Irrigation and Debridement  Patient:Maricel Gallego   : 1965  Medical Record Number:[id number]      Pre-operative Diagnosis:  Bacterial infection of knee joint [M00.869]    Post-operative Diagnosis: Same    Location: [facility]    Date of Procedure: 2025    Surgeon: Bharat Lindo MD    Assistant: Sugar Corbin CFA    Anesthesia: General and adductor nerve block    Procedure:    1.  Irrigation and Debridement (non-excisional) of Left Knee      Tourniquet Time: 0 minutes    EBL: 100cc    Complications: None    Patient Condition upon Completion of Procedure: Stable    Implants: * No implants in log *    Intra-Operative Findings: Bloody/purulent fluid encountered. Notable synovitis of the knee.    Description of Procedure:    An adductor canal block had been done in the preop holding area prior to being wheeled to the operating room.Maricel Gallego was brought to the operating room and transferred to the OR bed. After proper identification was performed general anesthesia was administered. Antibiotics were held until wound culture obtained.  The left leg and was then prepped and draped in the usual sterile manner. Prior to the incision being made a timeout was taken and documented by the circulating nurse.    One gram tranexemic acid was administered intravenously prior to incision. We did incorporate a prior scar in our skin incision. An anterior longitudinal incision was made just medial to the tibial tubercle and extending proximal. Any nonviable, friable tissue about the incision was removed.  A medial parapatellar capsular incision was performed. Upon entering the joint culture xbloody/purulent fluid was encounterd were obtained. Once cultures were taken antibiotics were given.    We debrided any nonviable, inflamed tissue. We thoroughly irrigated the wound with a combination of dilute betadine and Irrisept, Irrisept (dilute chlorhexidine)

## 2025-01-14 LAB
GLUCOSE BLD STRIP.AUTO-MCNC: 115 MG/DL (ref 65–100)
GLUCOSE BLD STRIP.AUTO-MCNC: 121 MG/DL (ref 65–100)
GLUCOSE BLD STRIP.AUTO-MCNC: 121 MG/DL (ref 65–100)
GLUCOSE BLD STRIP.AUTO-MCNC: 141 MG/DL (ref 65–100)
HCT VFR BLD AUTO: 30.6 % (ref 35.8–46.3)
HGB BLD-MCNC: 8.7 G/DL (ref 11.7–15.4)
SERVICE CMNT-IMP: ABNORMAL

## 2025-01-14 PROCEDURE — 97530 THERAPEUTIC ACTIVITIES: CPT

## 2025-01-14 PROCEDURE — 2500000003 HC RX 250 WO HCPCS: Performed by: NURSE PRACTITIONER

## 2025-01-14 PROCEDURE — 85018 HEMOGLOBIN: CPT

## 2025-01-14 PROCEDURE — 85014 HEMATOCRIT: CPT

## 2025-01-14 PROCEDURE — 6370000000 HC RX 637 (ALT 250 FOR IP): Performed by: NURSE PRACTITIONER

## 2025-01-14 PROCEDURE — 6360000002 HC RX W HCPCS: Performed by: INTERNAL MEDICINE

## 2025-01-14 PROCEDURE — 36415 COLL VENOUS BLD VENIPUNCTURE: CPT

## 2025-01-14 PROCEDURE — 82962 GLUCOSE BLOOD TEST: CPT

## 2025-01-14 PROCEDURE — 2580000003 HC RX 258: Performed by: INTERNAL MEDICINE

## 2025-01-14 RX ADMIN — CEFEPIME 2000 MG: 2 INJECTION, POWDER, FOR SOLUTION INTRAVENOUS at 13:13

## 2025-01-14 RX ADMIN — MELOXICAM 7.5 MG: 7.5 TABLET ORAL at 08:31

## 2025-01-14 RX ADMIN — SENNOSIDES AND DOCUSATE SODIUM 1 TABLET: 8.6; 5 TABLET ORAL at 21:47

## 2025-01-14 RX ADMIN — METFORMIN HYDROCHLORIDE 500 MG: 500 TABLET ORAL at 08:31

## 2025-01-14 RX ADMIN — OXYCODONE 5 MG: 5 TABLET ORAL at 22:16

## 2025-01-14 RX ADMIN — ALOGLIPTIN 12.5 MG: 12.5 TABLET, FILM COATED ORAL at 08:31

## 2025-01-14 RX ADMIN — DULOXETINE HYDROCHLORIDE 60 MG: 60 CAPSULE, DELAYED RELEASE ORAL at 21:47

## 2025-01-14 RX ADMIN — ACETAMINOPHEN 1000 MG: 500 TABLET, FILM COATED ORAL at 06:20

## 2025-01-14 RX ADMIN — MELOXICAM 7.5 MG: 7.5 TABLET ORAL at 21:47

## 2025-01-14 RX ADMIN — DULOXETINE HYDROCHLORIDE 60 MG: 60 CAPSULE, DELAYED RELEASE ORAL at 08:31

## 2025-01-14 RX ADMIN — SODIUM CHLORIDE, PRESERVATIVE FREE 10 ML: 5 INJECTION INTRAVENOUS at 08:32

## 2025-01-14 RX ADMIN — METOPROLOL TARTRATE 50 MG: 50 TABLET, FILM COATED ORAL at 21:46

## 2025-01-14 RX ADMIN — SODIUM CHLORIDE, PRESERVATIVE FREE 10 ML: 5 INJECTION INTRAVENOUS at 21:49

## 2025-01-14 RX ADMIN — CEFEPIME 2000 MG: 2 INJECTION, POWDER, FOR SOLUTION INTRAVENOUS at 06:19

## 2025-01-14 RX ADMIN — SENNOSIDES AND DOCUSATE SODIUM 1 TABLET: 8.6; 5 TABLET ORAL at 08:31

## 2025-01-14 RX ADMIN — ACETAMINOPHEN 1000 MG: 500 TABLET, FILM COATED ORAL at 11:14

## 2025-01-14 RX ADMIN — OXYCODONE 10 MG: 5 TABLET ORAL at 06:20

## 2025-01-14 RX ADMIN — CEFEPIME 2000 MG: 2 INJECTION, POWDER, FOR SOLUTION INTRAVENOUS at 22:10

## 2025-01-14 RX ADMIN — ACETAMINOPHEN 1000 MG: 500 TABLET, FILM COATED ORAL at 17:14

## 2025-01-14 RX ADMIN — GABAPENTIN 600 MG: 300 CAPSULE ORAL at 21:46

## 2025-01-14 RX ADMIN — PANTOPRAZOLE SODIUM 40 MG: 40 TABLET, DELAYED RELEASE ORAL at 06:20

## 2025-01-14 RX ADMIN — METFORMIN HYDROCHLORIDE 500 MG: 500 TABLET ORAL at 21:47

## 2025-01-14 RX ADMIN — APIXABAN 5 MG: 5 TABLET, FILM COATED ORAL at 21:47

## 2025-01-14 RX ADMIN — APIXABAN 5 MG: 5 TABLET, FILM COATED ORAL at 08:32

## 2025-01-14 RX ADMIN — OXYCODONE 10 MG: 5 TABLET ORAL at 11:14

## 2025-01-14 ASSESSMENT — PAIN DESCRIPTION - LOCATION
LOCATION: KNEE

## 2025-01-14 ASSESSMENT — PAIN SCALES - GENERAL
PAINLEVEL_OUTOF10: 3
PAINLEVEL_OUTOF10: 4
PAINLEVEL_OUTOF10: 0
PAINLEVEL_OUTOF10: 7
PAINLEVEL_OUTOF10: 7
PAINLEVEL_OUTOF10: 3
PAINLEVEL_OUTOF10: 3

## 2025-01-14 ASSESSMENT — PAIN DESCRIPTION - ONSET: ONSET: ON-GOING

## 2025-01-14 ASSESSMENT — PAIN DESCRIPTION - ORIENTATION
ORIENTATION: LEFT

## 2025-01-14 ASSESSMENT — PAIN DESCRIPTION - PAIN TYPE: TYPE: ACUTE PAIN;SURGICAL PAIN

## 2025-01-14 ASSESSMENT — PAIN DESCRIPTION - DESCRIPTORS
DESCRIPTORS: ACHING
DESCRIPTORS: ACHING;SORE
DESCRIPTORS: ACHING

## 2025-01-14 ASSESSMENT — PAIN DESCRIPTION - FREQUENCY: FREQUENCY: CONTINUOUS

## 2025-01-14 ASSESSMENT — PAIN SCALES - WONG BAKER: WONGBAKER_NUMERICALRESPONSE: HURTS A LITTLE BIT

## 2025-01-14 ASSESSMENT — PAIN - FUNCTIONAL ASSESSMENT: PAIN_FUNCTIONAL_ASSESSMENT: ACTIVITIES ARE NOT PREVENTED

## 2025-01-14 NOTE — CARE COORDINATION
CM note:    Chart reviewed for updates. Therapy recommending IPR. Pt requested for referral to be sent to [facility]. Referral sent via Epic pending review. Kaela the liaison at [facility] has been notified about the referral. If accepted, pt will need insurance auth since she has [employer]. ID consulted. DC plan pending ID recs. CM will continue to follow up with d/c planning.    MICHAEL Crawford

## 2025-01-14 NOTE — PLAN OF CARE
Problem: Pain  Goal: Verbalizes/displays adequate comfort level or baseline comfort level  Outcome: Progressing     Problem: Safety - Adult  Goal: Free from fall injury  Outcome: Progressing  Flowsheets (Taken 1/13/2025 1436 by Leo Melton, RN)  Free From Fall Injury: Instruct family/caregiver on patient safety     Problem: Chronic Conditions and Co-morbidities  Goal: Patient's chronic conditions and co-morbidity symptoms are monitored and maintained or improved  Outcome: Progressing  Flowsheets (Taken 1/13/2025 1431 by Leo Melton, RN)  Care Plan - Patient's Chronic Conditions and Co-Morbidity Symptoms are Monitored and Maintained or Improved: Monitor and assess patient's chronic conditions and comorbid symptoms for stability, deterioration, or improvement     Problem: Discharge Planning  Goal: Discharge to home or other facility with appropriate resources  Outcome: Progressing  Flowsheets (Taken 1/13/2025 1431 by Leo Melton, RN)  Discharge to home or other facility with appropriate resources: Identify barriers to discharge with patient and caregiver     Problem: ABCDS Injury Assessment  Goal: Absence of physical injury  Outcome: Progressing

## 2025-01-15 LAB
CREAT SERPL-MCNC: 0.67 MG/DL (ref 0.6–1.1)
GLUCOSE BLD STRIP.AUTO-MCNC: 106 MG/DL (ref 65–100)
GLUCOSE BLD STRIP.AUTO-MCNC: 107 MG/DL (ref 65–100)
GLUCOSE BLD STRIP.AUTO-MCNC: 117 MG/DL (ref 65–100)
GLUCOSE BLD STRIP.AUTO-MCNC: 92 MG/DL (ref 65–100)
SERVICE CMNT-IMP: ABNORMAL
SERVICE CMNT-IMP: NORMAL

## 2025-01-15 PROCEDURE — 6360000002 HC RX W HCPCS: Performed by: INTERNAL MEDICINE

## 2025-01-15 PROCEDURE — 2580000003 HC RX 258: Performed by: INTERNAL MEDICINE

## 2025-01-15 PROCEDURE — 82565 ASSAY OF CREATININE: CPT

## 2025-01-15 PROCEDURE — 36415 COLL VENOUS BLD VENIPUNCTURE: CPT

## 2025-01-15 PROCEDURE — 97530 THERAPEUTIC ACTIVITIES: CPT

## 2025-01-15 PROCEDURE — 6370000000 HC RX 637 (ALT 250 FOR IP): Performed by: NURSE PRACTITIONER

## 2025-01-15 PROCEDURE — 82962 GLUCOSE BLOOD TEST: CPT

## 2025-01-15 PROCEDURE — 1100000000 HC RM PRIVATE

## 2025-01-15 PROCEDURE — 2500000003 HC RX 250 WO HCPCS: Performed by: NURSE PRACTITIONER

## 2025-01-15 RX ADMIN — OXYCODONE 10 MG: 5 TABLET ORAL at 09:03

## 2025-01-15 RX ADMIN — CEFEPIME 2000 MG: 2 INJECTION, POWDER, FOR SOLUTION INTRAVENOUS at 05:16

## 2025-01-15 RX ADMIN — METOPROLOL TARTRATE 50 MG: 50 TABLET, FILM COATED ORAL at 21:04

## 2025-01-15 RX ADMIN — DULOXETINE HYDROCHLORIDE 60 MG: 60 CAPSULE, DELAYED RELEASE ORAL at 20:51

## 2025-01-15 RX ADMIN — MELOXICAM 7.5 MG: 7.5 TABLET ORAL at 08:39

## 2025-01-15 RX ADMIN — METOPROLOL TARTRATE 50 MG: 50 TABLET, FILM COATED ORAL at 08:40

## 2025-01-15 RX ADMIN — OXYCODONE 10 MG: 5 TABLET ORAL at 13:04

## 2025-01-15 RX ADMIN — METFORMIN HYDROCHLORIDE 500 MG: 500 TABLET ORAL at 20:51

## 2025-01-15 RX ADMIN — CEFEPIME 2000 MG: 2 INJECTION, POWDER, FOR SOLUTION INTRAVENOUS at 20:58

## 2025-01-15 RX ADMIN — PANTOPRAZOLE SODIUM 40 MG: 40 TABLET, DELAYED RELEASE ORAL at 05:17

## 2025-01-15 RX ADMIN — APIXABAN 5 MG: 5 TABLET, FILM COATED ORAL at 20:51

## 2025-01-15 RX ADMIN — SENNOSIDES AND DOCUSATE SODIUM 1 TABLET: 8.6; 5 TABLET ORAL at 20:51

## 2025-01-15 RX ADMIN — CEFEPIME 2000 MG: 2 INJECTION, POWDER, FOR SOLUTION INTRAVENOUS at 13:11

## 2025-01-15 RX ADMIN — DULOXETINE HYDROCHLORIDE 60 MG: 60 CAPSULE, DELAYED RELEASE ORAL at 08:39

## 2025-01-15 RX ADMIN — ACETAMINOPHEN 1000 MG: 500 TABLET, FILM COATED ORAL at 13:04

## 2025-01-15 RX ADMIN — APIXABAN 5 MG: 5 TABLET, FILM COATED ORAL at 08:40

## 2025-01-15 RX ADMIN — GABAPENTIN 600 MG: 300 CAPSULE ORAL at 08:39

## 2025-01-15 RX ADMIN — GABAPENTIN 600 MG: 300 CAPSULE ORAL at 20:51

## 2025-01-15 RX ADMIN — MELOXICAM 7.5 MG: 7.5 TABLET ORAL at 20:51

## 2025-01-15 RX ADMIN — SODIUM CHLORIDE, PRESERVATIVE FREE 10 ML: 5 INJECTION INTRAVENOUS at 20:54

## 2025-01-15 RX ADMIN — ACETAMINOPHEN 1000 MG: 500 TABLET, FILM COATED ORAL at 05:17

## 2025-01-15 RX ADMIN — ALOGLIPTIN 12.5 MG: 12.5 TABLET, FILM COATED ORAL at 08:39

## 2025-01-15 RX ADMIN — METFORMIN HYDROCHLORIDE 500 MG: 500 TABLET ORAL at 08:40

## 2025-01-15 RX ADMIN — VANCOMYCIN HYDROCHLORIDE 2000 MG: 10 INJECTION, POWDER, LYOPHILIZED, FOR SOLUTION INTRAVENOUS at 13:10

## 2025-01-15 RX ADMIN — SENNOSIDES AND DOCUSATE SODIUM 1 TABLET: 8.6; 5 TABLET ORAL at 08:39

## 2025-01-15 ASSESSMENT — PAIN SCALES - GENERAL
PAINLEVEL_OUTOF10: 4
PAINLEVEL_OUTOF10: 3
PAINLEVEL_OUTOF10: 8
PAINLEVEL_OUTOF10: 7

## 2025-01-15 ASSESSMENT — PAIN DESCRIPTION - LOCATION
LOCATION: KNEE
LOCATION: KNEE

## 2025-01-15 ASSESSMENT — PAIN DESCRIPTION - DESCRIPTORS
DESCRIPTORS: ACHING
DESCRIPTORS: ACHING

## 2025-01-15 ASSESSMENT — PAIN DESCRIPTION - ORIENTATION
ORIENTATION: LEFT
ORIENTATION: LEFT

## 2025-01-15 NOTE — ICUWATCH
RRT Clinical Rounding Nurse Assistance    Called to assist primary RN with IV start. [facility]    Raina Bruno RN  [facility]: [phone]  [facility]: [phone]

## 2025-01-15 NOTE — CONSULTS
-Patient chart reviewed  -Denied for inpatient rehab services at this time, currently does not meet diagnostic admission criteria for IRF level setting  -Patient is currently too low level to be able to participate in 3-4 hours of therapy a day.  Per chart review she receives assistance for mobility and ADLs in the home setting.  Appears close to baseline?  Per PT note, believes she can gain slight improvement in independence at the wheelchair level  -If patient is planning on discharging soon, and still requiring peripheral physician oversight at the time of discharge, patient may benefit from a less intense therapy schedule over a prolonged duration of time in a setting such as a SNF.  -Thank you for this consult. PM&R to continue monitoring functional needs and rehab potential during acute hospital stay     
Infectious Diseases Consult    Today's Date: 2025   Admit Date: 2025  : 1965    Impression/Plan   Possible L knee native joint septic arthritis vs hemarthrosis  Cultures from the office with enterobacter cloacae  S/p I&D 25  Fluid with 3.7 million RBC's and only 3,600 wbc's (95% PMNs)  We will follow cultures and I have her on cefepime for now. IF we feel this is an infection, and no new organism grow in the current operative cultures, then we can transition to oral cipro to complete therapy.   Even if I am not convinced that this is infection, I would err on the side of treating just in case.   DM II w/o LTUI: A1C recently 6.4  Obesity with BMI 36    Anti-infectives:   Cefepime  -  Received vanc and cefazolin pre-op    Subjective:   Date of Consultation:  2025  Referring Physician: Bharat Lindo MD for: assistance in management of the above    Patient is a 59 y.o. female who lost her  of 40 years in 2024, was on eliquis for a DVT noted in late September. She developed bilateral knee pain while her elevator was out in her condo building and she was having to navigate the stairs. The R leg, which was the worst, noted the DVT. She was admitted for 8-9 days, and then went to [facility] [facility] rehab. She was not pleased with the care there, and she developed a \"terrible yeast infection from where they would take hours before they changed me.\" After 2 months she was dc'd. She was paying $1800 per week to have a caregiver with her from 9am-9pm M-F. Her L knee became more of a problem, and she eventually was sent to Dr. Lindo. She reports that the attempted aspiration in the office did not yield much fluid, so she was sent to IR. \"They got some gelatinous material out, and this was sent for cultures.\" This is what grew the E. Cloacae.     She was then admitted by Dr. Lindo for washout. The fluid studies he sent argue more for hemarthrosis than infection. She denies f/c/s 
FLUARIX, FLULAVAL, FLUZONE (age 6 mo+) and AFLURIA, (age 3 y+), Quadv PF, 0.5mL 11/01/2016, 11/08/2017     No Known Allergies   Current Facility-Administered Medications   Medication Dose Route Frequency    [START ON 1/14/2025] apixaban (ELIQUIS) tablet 5 mg  5 mg Oral BID    DULoxetine (CYMBALTA) extended release capsule 60 mg  60 mg Oral BID    gabapentin (NEURONTIN) capsule 600 mg  600 mg Oral BID    hydrOXYzine HCl (ATARAX) tablet 25 mg  25 mg Oral Q8H PRN    metFORMIN (GLUCOPHAGE) tablet 500 mg  500 mg Oral BID    metoprolol tartrate (LOPRESSOR) tablet 50 mg  50 mg Oral BID    alogliptin (NESINA) tablet 12.5 mg  12.5 mg Oral Daily    0.9 % sodium chloride infusion   IntraVENous Continuous    sodium chloride flush 0.9 % injection 5-40 mL  5-40 mL IntraVENous 2 times per day    sodium chloride flush 0.9 % injection 5-40 mL  5-40 mL IntraVENous PRN    0.9 % sodium chloride infusion   IntraVENous PRN    acetaminophen (TYLENOL) tablet 1,000 mg  1,000 mg Oral Q6H    oxyCODONE (ROXICODONE) immediate release tablet 5 mg  5 mg Oral Q4H PRN    Or    oxyCODONE (ROXICODONE) immediate release tablet 10 mg  10 mg Oral Q4H PRN    HYDROmorphone HCl PF (DILAUDID) injection 1 mg  1 mg IntraVENous Q3H PRN    ceFAZolin (ANCEF) 2,000 mg in sterile water 20 mL IV syringe  2,000 mg IntraVENous Q8H    ondansetron (ZOFRAN-ODT) disintegrating tablet 4 mg  4 mg Oral Q8H PRN    Or    ondansetron (ZOFRAN) injection 4 mg  4 mg IntraVENous Q6H PRN    sennosides-docusate sodium (SENOKOT-S) 8.6-50 MG tablet 1 tablet  1 tablet Oral BID    aspirin EC tablet 81 mg  81 mg Oral BID    diphenhydrAMINE (BENADRYL) capsule 25 mg  25 mg Oral Q6H PRN    Or    diphenhydrAMINE (BENADRYL) injection 25 mg  25 mg IntraVENous Q6H PRN    meloxicam (MOBIC) tablet 7.5 mg  7.5 mg Oral BID    naloxone (NARCAN) injection 0.4 mg  0.4 mg IntraVENous PRN    [START ON 1/14/2025] pantoprazole (PROTONIX) tablet 40 mg  40 mg Oral QAM AC    tiZANidine (ZANAFLEX) tablet 4 mg

## 2025-01-16 LAB
ANION GAP SERPL CALC-SCNC: 9 MMOL/L (ref 7–16)
BACTERIA SPEC CULT: ABNORMAL
BUN SERPL-MCNC: 14 MG/DL (ref 8–23)
CALCIUM SERPL-MCNC: 9.1 MG/DL (ref 8.8–10.2)
CHLORIDE SERPL-SCNC: 100 MMOL/L (ref 98–107)
CO2 SERPL-SCNC: 26 MMOL/L (ref 20–29)
CREAT SERPL-MCNC: 0.51 MG/DL (ref 0.6–1.1)
FUNGAL CULT/SMEAR: NORMAL
GLUCOSE BLD STRIP.AUTO-MCNC: 108 MG/DL (ref 65–100)
GLUCOSE BLD STRIP.AUTO-MCNC: 112 MG/DL (ref 65–100)
GLUCOSE BLD STRIP.AUTO-MCNC: 115 MG/DL (ref 65–100)
GLUCOSE BLD STRIP.AUTO-MCNC: 120 MG/DL (ref 65–100)
GLUCOSE SERPL-MCNC: 115 MG/DL (ref 70–99)
GRAM STN SPEC: ABNORMAL
POTASSIUM SERPL-SCNC: 3.9 MMOL/L (ref 3.5–5.1)
SERVICE CMNT-IMP: ABNORMAL
SODIUM SERPL-SCNC: 135 MMOL/L (ref 136–145)
SPECIMEN PROCESSING: NORMAL
SPECIMEN SOURCE: NORMAL
VANCOMYCIN SERPL-MCNC: 15.9 UG/ML

## 2025-01-16 PROCEDURE — 82962 GLUCOSE BLOOD TEST: CPT

## 2025-01-16 PROCEDURE — 97530 THERAPEUTIC ACTIVITIES: CPT

## 2025-01-16 PROCEDURE — 80202 ASSAY OF VANCOMYCIN: CPT

## 2025-01-16 PROCEDURE — 99024 POSTOP FOLLOW-UP VISIT: CPT | Performed by: NURSE PRACTITIONER

## 2025-01-16 PROCEDURE — 80048 BASIC METABOLIC PNL TOTAL CA: CPT

## 2025-01-16 PROCEDURE — 2500000003 HC RX 250 WO HCPCS: Performed by: INTERNAL MEDICINE

## 2025-01-16 PROCEDURE — 6370000000 HC RX 637 (ALT 250 FOR IP): Performed by: NURSE PRACTITIONER

## 2025-01-16 PROCEDURE — 2580000003 HC RX 258: Performed by: INTERNAL MEDICINE

## 2025-01-16 PROCEDURE — 2500000003 HC RX 250 WO HCPCS: Performed by: NURSE PRACTITIONER

## 2025-01-16 PROCEDURE — 36415 COLL VENOUS BLD VENIPUNCTURE: CPT

## 2025-01-16 PROCEDURE — 6360000002 HC RX W HCPCS: Performed by: INTERNAL MEDICINE

## 2025-01-16 PROCEDURE — 1100000000 HC RM PRIVATE

## 2025-01-16 RX ADMIN — ACETAMINOPHEN 1000 MG: 500 TABLET, FILM COATED ORAL at 13:18

## 2025-01-16 RX ADMIN — APIXABAN 5 MG: 5 TABLET, FILM COATED ORAL at 08:48

## 2025-01-16 RX ADMIN — VANCOMYCIN HYDROCHLORIDE 1500 MG: 10 INJECTION, POWDER, LYOPHILIZED, FOR SOLUTION INTRAVENOUS at 00:38

## 2025-01-16 RX ADMIN — MELOXICAM 7.5 MG: 7.5 TABLET ORAL at 20:51

## 2025-01-16 RX ADMIN — SENNOSIDES AND DOCUSATE SODIUM 1 TABLET: 8.6; 5 TABLET ORAL at 08:49

## 2025-01-16 RX ADMIN — DULOXETINE HYDROCHLORIDE 60 MG: 60 CAPSULE, DELAYED RELEASE ORAL at 20:51

## 2025-01-16 RX ADMIN — SODIUM CHLORIDE, PRESERVATIVE FREE 10 ML: 5 INJECTION INTRAVENOUS at 20:50

## 2025-01-16 RX ADMIN — ALOGLIPTIN 12.5 MG: 12.5 TABLET, FILM COATED ORAL at 08:48

## 2025-01-16 RX ADMIN — WATER 2000 MG: 1 INJECTION INTRAMUSCULAR; INTRAVENOUS; SUBCUTANEOUS at 20:47

## 2025-01-16 RX ADMIN — METFORMIN HYDROCHLORIDE 500 MG: 500 TABLET ORAL at 20:51

## 2025-01-16 RX ADMIN — METFORMIN HYDROCHLORIDE 500 MG: 500 TABLET ORAL at 08:48

## 2025-01-16 RX ADMIN — METOPROLOL TARTRATE 50 MG: 50 TABLET, FILM COATED ORAL at 08:49

## 2025-01-16 RX ADMIN — SENNOSIDES AND DOCUSATE SODIUM 1 TABLET: 8.6; 5 TABLET ORAL at 20:51

## 2025-01-16 RX ADMIN — DULOXETINE HYDROCHLORIDE 60 MG: 60 CAPSULE, DELAYED RELEASE ORAL at 08:48

## 2025-01-16 RX ADMIN — APIXABAN 5 MG: 5 TABLET, FILM COATED ORAL at 20:51

## 2025-01-16 RX ADMIN — WATER 2000 MG: 1 INJECTION INTRAMUSCULAR; INTRAVENOUS; SUBCUTANEOUS at 13:20

## 2025-01-16 RX ADMIN — METOPROLOL TARTRATE 50 MG: 50 TABLET, FILM COATED ORAL at 20:51

## 2025-01-16 RX ADMIN — GABAPENTIN 600 MG: 300 CAPSULE ORAL at 20:51

## 2025-01-16 RX ADMIN — CEFEPIME 2000 MG: 2 INJECTION, POWDER, FOR SOLUTION INTRAVENOUS at 05:33

## 2025-01-16 RX ADMIN — MELOXICAM 7.5 MG: 7.5 TABLET ORAL at 08:48

## 2025-01-16 RX ADMIN — GABAPENTIN 600 MG: 300 CAPSULE ORAL at 08:48

## 2025-01-16 NOTE — CARE COORDINATION
Talked with JAMES DIAZ this am. He gave options and preferences for meds. Called [facility]. The [facility] willing/able to accept is [facility]. Long discussion with pt about need for 4 weeks of IV antibiotics. Sw talked with pt about her hired sitter as well. Informed pt about NH acceptance. Pt in agreement with [facility]. [facility] informed they are able to do the Ancef q 8 hours and will give her a private room. Travis requested she start Insurance precert today as it usually takes days to get approval. Ultimate plan is for pt to return home with her sitter and home health care.  Sw to cont to follow and assist with this difficult situation.   Samara Fitzgerald/MATTHEW

## 2025-01-17 LAB
GLUCOSE BLD STRIP.AUTO-MCNC: 105 MG/DL (ref 65–100)
GLUCOSE BLD STRIP.AUTO-MCNC: 108 MG/DL (ref 65–100)
GLUCOSE BLD STRIP.AUTO-MCNC: 115 MG/DL (ref 65–100)
GLUCOSE BLD STRIP.AUTO-MCNC: 99 MG/DL (ref 65–100)
SERVICE CMNT-IMP: ABNORMAL
SERVICE CMNT-IMP: NORMAL

## 2025-01-17 PROCEDURE — 2500000003 HC RX 250 WO HCPCS: Performed by: NURSE PRACTITIONER

## 2025-01-17 PROCEDURE — 97530 THERAPEUTIC ACTIVITIES: CPT

## 2025-01-17 PROCEDURE — 2500000003 HC RX 250 WO HCPCS: Performed by: INTERNAL MEDICINE

## 2025-01-17 PROCEDURE — 6360000002 HC RX W HCPCS: Performed by: INTERNAL MEDICINE

## 2025-01-17 PROCEDURE — 6370000000 HC RX 637 (ALT 250 FOR IP): Performed by: NURSE PRACTITIONER

## 2025-01-17 PROCEDURE — 82962 GLUCOSE BLOOD TEST: CPT

## 2025-01-17 PROCEDURE — 97535 SELF CARE MNGMENT TRAINING: CPT

## 2025-01-17 PROCEDURE — 36569 INSJ PICC 5 YR+ W/O IMAGING: CPT

## 2025-01-17 PROCEDURE — 05HY33Z INSERTION OF INFUSION DEVICE INTO UPPER VEIN, PERCUTANEOUS APPROACH: ICD-10-PCS | Performed by: ORTHOPAEDIC SURGERY

## 2025-01-17 PROCEDURE — C1751 CATH, INF, PER/CENT/MIDLINE: HCPCS

## 2025-01-17 PROCEDURE — 1100000000 HC RM PRIVATE

## 2025-01-17 RX ADMIN — METFORMIN HYDROCHLORIDE 500 MG: 500 TABLET ORAL at 20:30

## 2025-01-17 RX ADMIN — WATER 2000 MG: 1 INJECTION INTRAMUSCULAR; INTRAVENOUS; SUBCUTANEOUS at 04:17

## 2025-01-17 RX ADMIN — ACETAMINOPHEN 1000 MG: 500 TABLET, FILM COATED ORAL at 23:31

## 2025-01-17 RX ADMIN — MELOXICAM 7.5 MG: 7.5 TABLET ORAL at 09:37

## 2025-01-17 RX ADMIN — MELOXICAM 7.5 MG: 7.5 TABLET ORAL at 20:30

## 2025-01-17 RX ADMIN — METOPROLOL TARTRATE 50 MG: 50 TABLET, FILM COATED ORAL at 20:30

## 2025-01-17 RX ADMIN — APIXABAN 5 MG: 5 TABLET, FILM COATED ORAL at 20:30

## 2025-01-17 RX ADMIN — GABAPENTIN 600 MG: 300 CAPSULE ORAL at 20:30

## 2025-01-17 RX ADMIN — ACETAMINOPHEN 1000 MG: 500 TABLET, FILM COATED ORAL at 17:16

## 2025-01-17 RX ADMIN — APIXABAN 5 MG: 5 TABLET, FILM COATED ORAL at 09:37

## 2025-01-17 RX ADMIN — WATER 2000 MG: 1 INJECTION INTRAMUSCULAR; INTRAVENOUS; SUBCUTANEOUS at 20:29

## 2025-01-17 RX ADMIN — GABAPENTIN 600 MG: 300 CAPSULE ORAL at 09:37

## 2025-01-17 RX ADMIN — SENNOSIDES AND DOCUSATE SODIUM 1 TABLET: 8.6; 5 TABLET ORAL at 09:37

## 2025-01-17 RX ADMIN — PANTOPRAZOLE SODIUM 40 MG: 40 TABLET, DELAYED RELEASE ORAL at 05:14

## 2025-01-17 RX ADMIN — WATER 2000 MG: 1 INJECTION INTRAMUSCULAR; INTRAVENOUS; SUBCUTANEOUS at 12:31

## 2025-01-17 RX ADMIN — DULOXETINE HYDROCHLORIDE 60 MG: 60 CAPSULE, DELAYED RELEASE ORAL at 09:37

## 2025-01-17 RX ADMIN — METFORMIN HYDROCHLORIDE 500 MG: 500 TABLET ORAL at 09:37

## 2025-01-17 RX ADMIN — METOPROLOL TARTRATE 50 MG: 50 TABLET, FILM COATED ORAL at 09:37

## 2025-01-17 RX ADMIN — ALOGLIPTIN 12.5 MG: 12.5 TABLET, FILM COATED ORAL at 09:37

## 2025-01-17 RX ADMIN — ACETAMINOPHEN 1000 MG: 500 TABLET, FILM COATED ORAL at 00:58

## 2025-01-17 RX ADMIN — SODIUM CHLORIDE, PRESERVATIVE FREE 10 ML: 5 INJECTION INTRAVENOUS at 21:00

## 2025-01-17 RX ADMIN — ACETAMINOPHEN 1000 MG: 500 TABLET, FILM COATED ORAL at 05:14

## 2025-01-17 RX ADMIN — ACETAMINOPHEN 1000 MG: 500 TABLET, FILM COATED ORAL at 12:31

## 2025-01-17 RX ADMIN — DULOXETINE HYDROCHLORIDE 60 MG: 60 CAPSULE, DELAYED RELEASE ORAL at 20:30

## 2025-01-17 RX ADMIN — SENNOSIDES AND DOCUSATE SODIUM 1 TABLET: 8.6; 5 TABLET ORAL at 20:30

## 2025-01-17 NOTE — CARE COORDINATION
DC pending auth with [facility] and PICC placement.     ID final recommendations are in today's progress note. Per ID NP, official orders not written for SNF discharge. Will need to be added to discharge summary and AVS by attending.

## 2025-01-18 LAB
ANION GAP SERPL CALC-SCNC: 9 MMOL/L (ref 7–16)
BUN SERPL-MCNC: 12 MG/DL (ref 8–23)
CALCIUM SERPL-MCNC: 9.2 MG/DL (ref 8.8–10.2)
CHLORIDE SERPL-SCNC: 102 MMOL/L (ref 98–107)
CO2 SERPL-SCNC: 29 MMOL/L (ref 20–29)
CREAT SERPL-MCNC: 0.51 MG/DL (ref 0.6–1.1)
GLUCOSE BLD STRIP.AUTO-MCNC: 111 MG/DL (ref 65–100)
GLUCOSE BLD STRIP.AUTO-MCNC: 119 MG/DL (ref 65–100)
GLUCOSE BLD STRIP.AUTO-MCNC: 126 MG/DL (ref 65–100)
GLUCOSE BLD STRIP.AUTO-MCNC: 83 MG/DL (ref 65–100)
GLUCOSE SERPL-MCNC: 103 MG/DL (ref 70–99)
HCT VFR BLD AUTO: 31.7 % (ref 35.8–46.3)
HGB BLD-MCNC: 9.2 G/DL (ref 11.7–15.4)
POTASSIUM SERPL-SCNC: 4 MMOL/L (ref 3.5–5.1)
SERVICE CMNT-IMP: ABNORMAL
SERVICE CMNT-IMP: NORMAL
SODIUM SERPL-SCNC: 140 MMOL/L (ref 136–145)

## 2025-01-18 PROCEDURE — 2500000003 HC RX 250 WO HCPCS: Performed by: NURSE PRACTITIONER

## 2025-01-18 PROCEDURE — 2500000003 HC RX 250 WO HCPCS: Performed by: INTERNAL MEDICINE

## 2025-01-18 PROCEDURE — 6360000002 HC RX W HCPCS: Performed by: INTERNAL MEDICINE

## 2025-01-18 PROCEDURE — 85014 HEMATOCRIT: CPT

## 2025-01-18 PROCEDURE — 1100000000 HC RM PRIVATE

## 2025-01-18 PROCEDURE — 85018 HEMOGLOBIN: CPT

## 2025-01-18 PROCEDURE — 97530 THERAPEUTIC ACTIVITIES: CPT

## 2025-01-18 PROCEDURE — 36415 COLL VENOUS BLD VENIPUNCTURE: CPT

## 2025-01-18 PROCEDURE — 6370000000 HC RX 637 (ALT 250 FOR IP): Performed by: NURSE PRACTITIONER

## 2025-01-18 PROCEDURE — 82962 GLUCOSE BLOOD TEST: CPT

## 2025-01-18 PROCEDURE — 80048 BASIC METABOLIC PNL TOTAL CA: CPT

## 2025-01-18 RX ADMIN — ALOGLIPTIN 12.5 MG: 12.5 TABLET, FILM COATED ORAL at 10:00

## 2025-01-18 RX ADMIN — METFORMIN HYDROCHLORIDE 500 MG: 500 TABLET ORAL at 21:42

## 2025-01-18 RX ADMIN — METOPROLOL TARTRATE 50 MG: 50 TABLET, FILM COATED ORAL at 21:45

## 2025-01-18 RX ADMIN — MELOXICAM 7.5 MG: 7.5 TABLET ORAL at 21:42

## 2025-01-18 RX ADMIN — WATER 2000 MG: 1 INJECTION INTRAMUSCULAR; INTRAVENOUS; SUBCUTANEOUS at 21:42

## 2025-01-18 RX ADMIN — ACETAMINOPHEN 1000 MG: 500 TABLET, FILM COATED ORAL at 05:57

## 2025-01-18 RX ADMIN — METFORMIN HYDROCHLORIDE 500 MG: 500 TABLET ORAL at 10:01

## 2025-01-18 RX ADMIN — WATER 2000 MG: 1 INJECTION INTRAMUSCULAR; INTRAVENOUS; SUBCUTANEOUS at 12:27

## 2025-01-18 RX ADMIN — APIXABAN 5 MG: 5 TABLET, FILM COATED ORAL at 21:42

## 2025-01-18 RX ADMIN — PANTOPRAZOLE SODIUM 40 MG: 40 TABLET, DELAYED RELEASE ORAL at 05:57

## 2025-01-18 RX ADMIN — DULOXETINE HYDROCHLORIDE 60 MG: 60 CAPSULE, DELAYED RELEASE ORAL at 21:42

## 2025-01-18 RX ADMIN — DULOXETINE HYDROCHLORIDE 60 MG: 60 CAPSULE, DELAYED RELEASE ORAL at 10:00

## 2025-01-18 RX ADMIN — METOPROLOL TARTRATE 50 MG: 50 TABLET, FILM COATED ORAL at 10:00

## 2025-01-18 RX ADMIN — GABAPENTIN 600 MG: 300 CAPSULE ORAL at 21:42

## 2025-01-18 RX ADMIN — APIXABAN 5 MG: 5 TABLET, FILM COATED ORAL at 10:01

## 2025-01-18 RX ADMIN — GABAPENTIN 600 MG: 300 CAPSULE ORAL at 10:01

## 2025-01-18 RX ADMIN — MELOXICAM 7.5 MG: 7.5 TABLET ORAL at 10:00

## 2025-01-18 RX ADMIN — WATER 2000 MG: 1 INJECTION INTRAMUSCULAR; INTRAVENOUS; SUBCUTANEOUS at 05:57

## 2025-01-18 RX ADMIN — SODIUM CHLORIDE, PRESERVATIVE FREE 10 ML: 5 INJECTION INTRAVENOUS at 21:43

## 2025-01-18 RX ADMIN — SENNOSIDES AND DOCUSATE SODIUM 1 TABLET: 8.6; 5 TABLET ORAL at 21:42

## 2025-01-18 RX ADMIN — TIZANIDINE 4 MG: 2 TABLET ORAL at 21:42

## 2025-01-18 ASSESSMENT — PAIN DESCRIPTION - DESCRIPTORS: DESCRIPTORS: SPASM

## 2025-01-18 ASSESSMENT — PAIN DESCRIPTION - LOCATION: LOCATION: LEG

## 2025-01-18 ASSESSMENT — PAIN SCALES - GENERAL
PAINLEVEL_OUTOF10: 4
PAINLEVEL_OUTOF10: 1

## 2025-01-18 ASSESSMENT — PAIN DESCRIPTION - ORIENTATION: ORIENTATION: LEFT

## 2025-01-18 ASSESSMENT — PAIN - FUNCTIONAL ASSESSMENT: PAIN_FUNCTIONAL_ASSESSMENT: PREVENTS OR INTERFERES SOME ACTIVE ACTIVITIES AND ADLS

## 2025-01-18 NOTE — PLAN OF CARE
Problem: Pain  Goal: Verbalizes/displays adequate comfort level or baseline comfort level  1/17/2025 2223 by Katina García RN  Outcome: Progressing  1/17/2025 2222 by Katina García RN  Outcome: Progressing  1/17/2025 1841 by Mily Cleary RN  Outcome: Progressing     Problem: Safety - Adult  Goal: Free from fall injury  1/17/2025 2223 by Katina García RN  Outcome: Progressing  1/17/2025 2222 by Katina García RN  Outcome: Progressing     Problem: Chronic Conditions and Co-morbidities  Goal: Patient's chronic conditions and co-morbidity symptoms are monitored and maintained or improved  1/17/2025 2223 by Katina García RN  Outcome: Progressing  1/17/2025 2222 by Katina García RN  Outcome: Progressing     Problem: Discharge Planning  Goal: Discharge to home or other facility with appropriate resources  1/17/2025 2223 by Katina García RN  Outcome: Progressing  1/17/2025 2222 by Katina García RN  Outcome: Progressing     Problem: ABCDS Injury Assessment  Goal: Absence of physical injury  1/17/2025 2223 by Katina García RN  Outcome: Progressing  1/17/2025 2222 by Katina García RN  Outcome: Progressing

## 2025-01-18 NOTE — CARE COORDINATION
CM note:    Chart reviewed for updates. CM contacted [facility] [phone] to follow up on insurance authorization. She reports that pt's insurance auth is still pending at this time. CM will continue to follow up with d/c planning.    Yelena Nagy MSW

## 2025-01-18 NOTE — PLAN OF CARE
Problem: Pain  Goal: Verbalizes/displays adequate comfort level or baseline comfort level  1/17/2025 2222 by Katina García, RN  Outcome: Progressing  1/17/2025 1841 by Mily Cleary, RN  Outcome: Progressing     Problem: Safety - Adult  Goal: Free from fall injury  Outcome: Progressing     Problem: Chronic Conditions and Co-morbidities  Goal: Patient's chronic conditions and co-morbidity symptoms are monitored and maintained or improved  Outcome: Progressing     Problem: Discharge Planning  Goal: Discharge to home or other facility with appropriate resources  Outcome: Progressing     Problem: ABCDS Injury Assessment  Goal: Absence of physical injury  Outcome: Progressing

## 2025-01-18 NOTE — PLAN OF CARE
Problem: Pain  Goal: Verbalizes/displays adequate comfort level or baseline comfort level  1/17/2025 2223 by Katina Garcaí RN  Outcome: Progressing  1/17/2025 2223 by Katina García RN  Outcome: Progressing  1/17/2025 2222 by Katina García RN  Outcome: Progressing  1/17/2025 1841 by Mily Cleary RN  Outcome: Progressing     Problem: Safety - Adult  Goal: Free from fall injury  1/17/2025 2223 by Katina García RN  Outcome: Progressing  1/17/2025 2223 by Katina García RN  Outcome: Progressing  1/17/2025 2222 by Katina García RN  Outcome: Progressing     Problem: Chronic Conditions and Co-morbidities  Goal: Patient's chronic conditions and co-morbidity symptoms are monitored and maintained or improved  1/17/2025 2223 by Katina García RN  Outcome: Progressing  1/17/2025 2223 by Katina García RN  Outcome: Progressing  1/17/2025 2222 by Katina García RN  Outcome: Progressing     Problem: Discharge Planning  Goal: Discharge to home or other facility with appropriate resources  1/17/2025 2223 by Katina García RN  Outcome: Progressing  1/17/2025 2223 by Katina García RN  Outcome: Progressing  1/17/2025 2222 by Katina García RN  Outcome: Progressing     Problem: ABCDS Injury Assessment  Goal: Absence of physical injury  1/17/2025 2223 by Katina García RN  Outcome: Progressing  1/17/2025 2223 by Katina García RN  Outcome: Progressing  1/17/2025 2222 by Katina García RN  Outcome: Progressing

## 2025-01-19 LAB
GLUCOSE BLD STRIP.AUTO-MCNC: 112 MG/DL (ref 65–100)
GLUCOSE BLD STRIP.AUTO-MCNC: 113 MG/DL (ref 65–100)
GLUCOSE BLD STRIP.AUTO-MCNC: 121 MG/DL (ref 65–100)
GLUCOSE BLD STRIP.AUTO-MCNC: 85 MG/DL (ref 65–100)
SERVICE CMNT-IMP: ABNORMAL
SERVICE CMNT-IMP: NORMAL

## 2025-01-19 PROCEDURE — 1100000000 HC RM PRIVATE

## 2025-01-19 PROCEDURE — 6370000000 HC RX 637 (ALT 250 FOR IP): Performed by: NURSE PRACTITIONER

## 2025-01-19 PROCEDURE — 2500000003 HC RX 250 WO HCPCS: Performed by: INTERNAL MEDICINE

## 2025-01-19 PROCEDURE — 97530 THERAPEUTIC ACTIVITIES: CPT

## 2025-01-19 PROCEDURE — 6360000002 HC RX W HCPCS: Performed by: INTERNAL MEDICINE

## 2025-01-19 PROCEDURE — 82962 GLUCOSE BLOOD TEST: CPT

## 2025-01-19 PROCEDURE — 2500000003 HC RX 250 WO HCPCS: Performed by: NURSE PRACTITIONER

## 2025-01-19 RX ADMIN — WATER 2000 MG: 1 INJECTION INTRAMUSCULAR; INTRAVENOUS; SUBCUTANEOUS at 11:55

## 2025-01-19 RX ADMIN — GABAPENTIN 600 MG: 300 CAPSULE ORAL at 21:40

## 2025-01-19 RX ADMIN — APIXABAN 5 MG: 5 TABLET, FILM COATED ORAL at 21:40

## 2025-01-19 RX ADMIN — GABAPENTIN 600 MG: 300 CAPSULE ORAL at 09:29

## 2025-01-19 RX ADMIN — WATER 2000 MG: 1 INJECTION INTRAMUSCULAR; INTRAVENOUS; SUBCUTANEOUS at 06:07

## 2025-01-19 RX ADMIN — METFORMIN HYDROCHLORIDE 500 MG: 500 TABLET ORAL at 09:29

## 2025-01-19 RX ADMIN — ALOGLIPTIN 12.5 MG: 12.5 TABLET, FILM COATED ORAL at 09:29

## 2025-01-19 RX ADMIN — APIXABAN 5 MG: 5 TABLET, FILM COATED ORAL at 09:29

## 2025-01-19 RX ADMIN — SENNOSIDES AND DOCUSATE SODIUM 1 TABLET: 8.6; 5 TABLET ORAL at 21:41

## 2025-01-19 RX ADMIN — DULOXETINE HYDROCHLORIDE 60 MG: 60 CAPSULE, DELAYED RELEASE ORAL at 21:41

## 2025-01-19 RX ADMIN — MELOXICAM 7.5 MG: 7.5 TABLET ORAL at 09:29

## 2025-01-19 RX ADMIN — METOPROLOL TARTRATE 50 MG: 50 TABLET, FILM COATED ORAL at 21:41

## 2025-01-19 RX ADMIN — METOPROLOL TARTRATE 50 MG: 50 TABLET, FILM COATED ORAL at 09:29

## 2025-01-19 RX ADMIN — DULOXETINE HYDROCHLORIDE 60 MG: 60 CAPSULE, DELAYED RELEASE ORAL at 09:29

## 2025-01-19 RX ADMIN — ACETAMINOPHEN 1000 MG: 500 TABLET, FILM COATED ORAL at 06:08

## 2025-01-19 RX ADMIN — PANTOPRAZOLE SODIUM 40 MG: 40 TABLET, DELAYED RELEASE ORAL at 06:08

## 2025-01-19 RX ADMIN — SODIUM CHLORIDE, PRESERVATIVE FREE 10 ML: 5 INJECTION INTRAVENOUS at 21:39

## 2025-01-19 RX ADMIN — TIZANIDINE 4 MG: 2 TABLET ORAL at 21:40

## 2025-01-19 RX ADMIN — METFORMIN HYDROCHLORIDE 500 MG: 500 TABLET ORAL at 21:41

## 2025-01-19 RX ADMIN — WATER 2000 MG: 1 INJECTION INTRAMUSCULAR; INTRAVENOUS; SUBCUTANEOUS at 21:39

## 2025-01-19 RX ADMIN — MELOXICAM 7.5 MG: 7.5 TABLET ORAL at 21:41

## 2025-01-19 ASSESSMENT — PAIN SCALES - GENERAL: PAINLEVEL_OUTOF10: 0

## 2025-01-19 NOTE — RT PROTOCOL NOTE
Respiratory Care Services     Policy Number: [id number]    Title: Oxygen Protocol    Effective Date: 01/1996    Revised Date: 06/2013, 02/29/2016, 4/2018, 7/2019    Reviewed Date: 05/2014, 03/2015, 06/2017, 11/2020        I.  Policy: The Oxygen Protocol will be initiated for all patients upon written order from physician for administration of oxygen therapy or if a patient is found to have an oxygen saturation of 88% or less. Special consideration: the goal of oxygen therapy for COPD patients is to maintain oxygen saturation between 88% - 92% to comply with GOLD Guidelines.    II. Purpose: To provide protocol driven respiratory therapy for the administration of oxygen at concentrations greater than that in ambient air with the intent of treating or preventing the symptoms and manifestations of hypoxia.    III. Responsibility: Director Respiratory Care Services, all Respiratory Care Practitioners     IV. Indications:   Implement this protocol for patients when physician orders oxygen to be administered or when patient is found to have an oxygen saturation of 88% or less.  To assure routine monitoring of patient's oxygen saturation b.i.d. and to make appropriate adjustments in accordance with ordered oxygen saturation parameters.  To assure continuity of respiratory care that meets [facility] Clinical Practice Guidelines and GOLD Guidelines.  Hb < 8  Sickle Cell anemia crisis    V. Assessment:  Assess the following parameters to determine the need to adjust oxygen:  Measurement of patient's oxygen saturation via pulse oximetry.    Observation of patient's color, respiratory effort, and responsiveness.  Measurement of heart rate and respiratory rate.  Complete a three-step ambulatory oxygen saturation when ordered.    VI. Initiation:  Upon receipt of an order for oxygen, the RCP will:   Verify order in the patient's EMR, which should include the desired oxygen saturation to be maintained.  The patient shall be placed on 
second breath hold.  Patient may be changed to self-administer as appropriate.   Patients in isolation will be provided an individual inhaler.  Unassisted aerosol (UA) is indicated if  Ventilation is inadequate  Patient is unable to perform MDI appropriately     VII. Guidelines:   Monitor patient's vital signs and evaluate patient's clinical status. The need to change medication and/or modality may be indicated by:  A pulse greater than 120 bpm, or if a pulse increase of 20 bpm occurs with bronchodilator medications.  Significant worsening of dyspnea or wheezing occurring during or within 30 minutes of discontinuing therapy.  Worsening of patient's sensorium (e.g. patient becomes confused or obtunded, and unable to follow directions).  Worsening of patient's chest x-ray.  Change in sputum (e.g. increased pulmonary infiltrate, which might indicate need for volume expansion therapy).  Patient has difficulty coughing up secretions, which might indicate need for acetylcysteine and/or bronchial hygiene therapy.  Call physician immediately if dyspnea worsens and is not responsive to modifications allowed by protocol.    VIII. Clinical Responsibility:  The therapy assessment guidelines will be used to evaluate all patients receiving aerosolized medications with the exception of critical care areas.    RCP's will perform changes in therapy per protocol.   It will be the responsibility of RCP to provide instruction regarding respiratory medications, possible side effects, aerosol therapy and proper MDI technique, as well as, spacer usage to patients ordered MDI therapy.   Current therapy that is part of a patient's home regimen will not be discontinued.  Provide spacer and educate patient on proper inhaler technique if needed.    IX. Documentation  Document assessment findings in the respiratory assessment section of the patient's EMR.  Document changes in therapy per protocol in the respiratory orders section and in the

## 2025-01-19 NOTE — CARE COORDINATION
CM note:    Chart reviewed for updates. Pt is pending insurance auth. Plan is to go to [facility] once auth is obtained. CM will continue to follow up with d/c planning.    MICHAEL Crawford

## 2025-01-19 NOTE — PLAN OF CARE
Problem: Pain  Goal: Verbalizes/displays adequate comfort level or baseline comfort level  1/19/2025 0023 by Katina García RN  Outcome: Progressing  1/18/2025 1807 by Mily Cleary RN  Outcome: Progressing  1/18/2025 1806 by Mily Cleary RN  Outcome: Progressing     Problem: Safety - Adult  Goal: Free from fall injury  Outcome: Progressing     Problem: Chronic Conditions and Co-morbidities  Goal: Patient's chronic conditions and co-morbidity symptoms are monitored and maintained or improved  Outcome: Progressing     Problem: Discharge Planning  Goal: Discharge to home or other facility with appropriate resources  Outcome: Progressing     Problem: ABCDS Injury Assessment  Goal: Absence of physical injury  Outcome: Progressing

## 2025-01-20 ENCOUNTER — TELEPHONE (OUTPATIENT)
Dept: INTERNAL MEDICINE CLINIC | Facility: CLINIC | Age: 60
End: 2025-01-20

## 2025-01-20 LAB
BACTERIA SPEC CULT: NORMAL
FUNGUS SMEAR: NORMAL
GLUCOSE BLD STRIP.AUTO-MCNC: 103 MG/DL (ref 65–100)
GLUCOSE BLD STRIP.AUTO-MCNC: 107 MG/DL (ref 65–100)
GLUCOSE BLD STRIP.AUTO-MCNC: 95 MG/DL (ref 65–100)
GLUCOSE BLD STRIP.AUTO-MCNC: 96 MG/DL (ref 65–100)
SERVICE CMNT-IMP: ABNORMAL
SERVICE CMNT-IMP: ABNORMAL
SERVICE CMNT-IMP: NORMAL
SPECIMEN SOURCE: NORMAL

## 2025-01-20 PROCEDURE — 6370000000 HC RX 637 (ALT 250 FOR IP): Performed by: NURSE PRACTITIONER

## 2025-01-20 PROCEDURE — 2500000003 HC RX 250 WO HCPCS: Performed by: NURSE PRACTITIONER

## 2025-01-20 PROCEDURE — 97530 THERAPEUTIC ACTIVITIES: CPT

## 2025-01-20 PROCEDURE — 6360000002 HC RX W HCPCS: Performed by: INTERNAL MEDICINE

## 2025-01-20 PROCEDURE — 2500000003 HC RX 250 WO HCPCS: Performed by: INTERNAL MEDICINE

## 2025-01-20 PROCEDURE — 82962 GLUCOSE BLOOD TEST: CPT

## 2025-01-20 PROCEDURE — 1100000000 HC RM PRIVATE

## 2025-01-20 PROCEDURE — 94760 N-INVAS EAR/PLS OXIMETRY 1: CPT

## 2025-01-20 RX ADMIN — DULOXETINE HYDROCHLORIDE 60 MG: 60 CAPSULE, DELAYED RELEASE ORAL at 08:57

## 2025-01-20 RX ADMIN — ACETAMINOPHEN 1000 MG: 500 TABLET, FILM COATED ORAL at 06:01

## 2025-01-20 RX ADMIN — APIXABAN 5 MG: 5 TABLET, FILM COATED ORAL at 08:57

## 2025-01-20 RX ADMIN — WATER 2000 MG: 1 INJECTION INTRAMUSCULAR; INTRAVENOUS; SUBCUTANEOUS at 20:21

## 2025-01-20 RX ADMIN — METFORMIN HYDROCHLORIDE 500 MG: 500 TABLET ORAL at 08:57

## 2025-01-20 RX ADMIN — DULOXETINE HYDROCHLORIDE 60 MG: 60 CAPSULE, DELAYED RELEASE ORAL at 20:22

## 2025-01-20 RX ADMIN — METOPROLOL TARTRATE 50 MG: 50 TABLET, FILM COATED ORAL at 20:22

## 2025-01-20 RX ADMIN — SODIUM CHLORIDE, PRESERVATIVE FREE 10 ML: 5 INJECTION INTRAVENOUS at 20:29

## 2025-01-20 RX ADMIN — METOPROLOL TARTRATE 50 MG: 50 TABLET, FILM COATED ORAL at 08:57

## 2025-01-20 RX ADMIN — SENNOSIDES AND DOCUSATE SODIUM 1 TABLET: 8.6; 5 TABLET ORAL at 20:22

## 2025-01-20 RX ADMIN — GABAPENTIN 600 MG: 300 CAPSULE ORAL at 08:57

## 2025-01-20 RX ADMIN — APIXABAN 5 MG: 5 TABLET, FILM COATED ORAL at 20:22

## 2025-01-20 RX ADMIN — PANTOPRAZOLE SODIUM 40 MG: 40 TABLET, DELAYED RELEASE ORAL at 06:01

## 2025-01-20 RX ADMIN — ALOGLIPTIN 12.5 MG: 12.5 TABLET, FILM COATED ORAL at 08:57

## 2025-01-20 RX ADMIN — MELOXICAM 7.5 MG: 7.5 TABLET ORAL at 08:57

## 2025-01-20 RX ADMIN — MELOXICAM 7.5 MG: 7.5 TABLET ORAL at 20:22

## 2025-01-20 RX ADMIN — WATER 2000 MG: 1 INJECTION INTRAMUSCULAR; INTRAVENOUS; SUBCUTANEOUS at 06:01

## 2025-01-20 RX ADMIN — METFORMIN HYDROCHLORIDE 500 MG: 500 TABLET ORAL at 20:22

## 2025-01-20 RX ADMIN — TIZANIDINE 4 MG: 2 TABLET ORAL at 20:21

## 2025-01-20 RX ADMIN — GABAPENTIN 600 MG: 300 CAPSULE ORAL at 20:22

## 2025-01-20 RX ADMIN — WATER 2000 MG: 1 INJECTION INTRAMUSCULAR; INTRAVENOUS; SUBCUTANEOUS at 12:36

## 2025-01-20 NOTE — PLAN OF CARE
Problem: Pain  Goal: Verbalizes/displays adequate comfort level or baseline comfort level  1/20/2025 0103 by Katina García, RN  Outcome: Progressing  1/19/2025 1834 by Mily Cleary, RN  Outcome: Progressing     Problem: Safety - Adult  Goal: Free from fall injury  Outcome: Progressing     Problem: Chronic Conditions and Co-morbidities  Goal: Patient's chronic conditions and co-morbidity symptoms are monitored and maintained or improved  Outcome: Progressing     Problem: Discharge Planning  Goal: Discharge to home or other facility with appropriate resources  Outcome: Progressing     Problem: ABCDS Injury Assessment  Goal: Absence of physical injury  Outcome: Progressing

## 2025-01-20 NOTE — TELEPHONE ENCOUNTER
----- Message from Jo Ann Mccauley PA-C sent at 1/7/2025  7:33 PM EST -----  Please let patient know that she is already established with a rheumatologist @ [facility] - Dr. An and she should schedule an appointment to see them - would likely get in much sooner than the new one she was referred to by Marianela who is not able to see her until April 2025.

## 2025-01-20 NOTE — CARE COORDINATION
CM note:    Chart reviewed for updates. Spoke with [facility] the liaison at [facility] who reported that pt insurance auth is pending review. CM will continue to follow up with d/c planning.    MICHAEL Crawford

## 2025-01-21 LAB
GLUCOSE BLD STRIP.AUTO-MCNC: 123 MG/DL (ref 65–100)
GLUCOSE BLD STRIP.AUTO-MCNC: 84 MG/DL (ref 65–100)
GLUCOSE BLD STRIP.AUTO-MCNC: 87 MG/DL (ref 65–100)
GLUCOSE BLD STRIP.AUTO-MCNC: 97 MG/DL (ref 65–100)
SERVICE CMNT-IMP: ABNORMAL
SERVICE CMNT-IMP: NORMAL

## 2025-01-21 PROCEDURE — 2500000003 HC RX 250 WO HCPCS: Performed by: NURSE PRACTITIONER

## 2025-01-21 PROCEDURE — 6360000002 HC RX W HCPCS: Performed by: INTERNAL MEDICINE

## 2025-01-21 PROCEDURE — 97530 THERAPEUTIC ACTIVITIES: CPT

## 2025-01-21 PROCEDURE — 1100000000 HC RM PRIVATE

## 2025-01-21 PROCEDURE — 82962 GLUCOSE BLOOD TEST: CPT

## 2025-01-21 PROCEDURE — 6370000000 HC RX 637 (ALT 250 FOR IP): Performed by: NURSE PRACTITIONER

## 2025-01-21 PROCEDURE — 2500000003 HC RX 250 WO HCPCS: Performed by: INTERNAL MEDICINE

## 2025-01-21 PROCEDURE — 97535 SELF CARE MNGMENT TRAINING: CPT

## 2025-01-21 RX ADMIN — WATER 2000 MG: 1 INJECTION INTRAMUSCULAR; INTRAVENOUS; SUBCUTANEOUS at 13:49

## 2025-01-21 RX ADMIN — PANTOPRAZOLE SODIUM 40 MG: 40 TABLET, DELAYED RELEASE ORAL at 05:29

## 2025-01-21 RX ADMIN — SENNOSIDES AND DOCUSATE SODIUM 1 TABLET: 8.6; 5 TABLET ORAL at 20:33

## 2025-01-21 RX ADMIN — DULOXETINE HYDROCHLORIDE 60 MG: 60 CAPSULE, DELAYED RELEASE ORAL at 20:33

## 2025-01-21 RX ADMIN — APIXABAN 5 MG: 5 TABLET, FILM COATED ORAL at 20:33

## 2025-01-21 RX ADMIN — MELOXICAM 7.5 MG: 7.5 TABLET ORAL at 09:47

## 2025-01-21 RX ADMIN — WATER 2000 MG: 1 INJECTION INTRAMUSCULAR; INTRAVENOUS; SUBCUTANEOUS at 20:33

## 2025-01-21 RX ADMIN — GABAPENTIN 600 MG: 300 CAPSULE ORAL at 09:47

## 2025-01-21 RX ADMIN — WATER 2000 MG: 1 INJECTION INTRAMUSCULAR; INTRAVENOUS; SUBCUTANEOUS at 03:36

## 2025-01-21 RX ADMIN — METFORMIN HYDROCHLORIDE 500 MG: 500 TABLET ORAL at 09:47

## 2025-01-21 RX ADMIN — SODIUM CHLORIDE, PRESERVATIVE FREE 10 ML: 5 INJECTION INTRAVENOUS at 20:35

## 2025-01-21 RX ADMIN — TIZANIDINE 4 MG: 2 TABLET ORAL at 17:19

## 2025-01-21 RX ADMIN — DULOXETINE HYDROCHLORIDE 60 MG: 60 CAPSULE, DELAYED RELEASE ORAL at 09:47

## 2025-01-21 RX ADMIN — METFORMIN HYDROCHLORIDE 500 MG: 500 TABLET ORAL at 20:33

## 2025-01-21 RX ADMIN — METOPROLOL TARTRATE 50 MG: 50 TABLET, FILM COATED ORAL at 09:47

## 2025-01-21 RX ADMIN — METOPROLOL TARTRATE 50 MG: 50 TABLET, FILM COATED ORAL at 20:33

## 2025-01-21 RX ADMIN — GABAPENTIN 600 MG: 300 CAPSULE ORAL at 20:33

## 2025-01-21 RX ADMIN — MELOXICAM 7.5 MG: 7.5 TABLET ORAL at 20:33

## 2025-01-21 RX ADMIN — ALOGLIPTIN 12.5 MG: 12.5 TABLET, FILM COATED ORAL at 09:47

## 2025-01-21 RX ADMIN — APIXABAN 5 MG: 5 TABLET, FILM COATED ORAL at 09:47

## 2025-01-21 RX ADMIN — ACETAMINOPHEN 1000 MG: 500 TABLET, FILM COATED ORAL at 17:19

## 2025-01-21 NOTE — CARE COORDINATION
CM note:    Update note: Auth has been approved. [facility] reports that they can admit patient tomorrow. Pt will go to  [phone] and number for report is [phone]. [facility] transport placed on WILL CALL. CM will continue to follow up with d/c planning.    Initial note: Chart reviewed for updates. Spoke with [facility] the liaison at [facility] and she reported that pt's insurance auth is still pending review. CM will continue to follow up with d/c planning.    MICHAEL Crawford

## 2025-01-22 VITALS
BODY MASS INDEX: 42.5 KG/M2 | HEIGHT: 67 IN | WEIGHT: 270.8 LBS | RESPIRATION RATE: 18 BRPM | DIASTOLIC BLOOD PRESSURE: 76 MMHG | HEART RATE: 76 BPM | OXYGEN SATURATION: 96 % | SYSTOLIC BLOOD PRESSURE: 123 MMHG | TEMPERATURE: 98.1 F

## 2025-01-22 LAB
GLUCOSE BLD STRIP.AUTO-MCNC: 128 MG/DL (ref 65–100)
GLUCOSE BLD STRIP.AUTO-MCNC: 89 MG/DL (ref 65–100)
SERVICE CMNT-IMP: ABNORMAL
SERVICE CMNT-IMP: NORMAL

## 2025-01-22 PROCEDURE — 6360000002 HC RX W HCPCS: Performed by: INTERNAL MEDICINE

## 2025-01-22 PROCEDURE — 2500000003 HC RX 250 WO HCPCS: Performed by: INTERNAL MEDICINE

## 2025-01-22 PROCEDURE — 97530 THERAPEUTIC ACTIVITIES: CPT

## 2025-01-22 PROCEDURE — 82962 GLUCOSE BLOOD TEST: CPT

## 2025-01-22 PROCEDURE — 6370000000 HC RX 637 (ALT 250 FOR IP): Performed by: NURSE PRACTITIONER

## 2025-01-22 PROCEDURE — 2500000003 HC RX 250 WO HCPCS: Performed by: NURSE PRACTITIONER

## 2025-01-22 PROCEDURE — 99238 HOSP IP/OBS DSCHRG MGMT 30/<: CPT | Performed by: NURSE PRACTITIONER

## 2025-01-22 RX ADMIN — MELOXICAM 7.5 MG: 7.5 TABLET ORAL at 09:01

## 2025-01-22 RX ADMIN — SODIUM CHLORIDE, PRESERVATIVE FREE 10 ML: 5 INJECTION INTRAVENOUS at 09:03

## 2025-01-22 RX ADMIN — ACETAMINOPHEN 1000 MG: 500 TABLET, FILM COATED ORAL at 05:56

## 2025-01-22 RX ADMIN — WATER 2000 MG: 1 INJECTION INTRAMUSCULAR; INTRAVENOUS; SUBCUTANEOUS at 11:34

## 2025-01-22 RX ADMIN — WATER 2000 MG: 1 INJECTION INTRAMUSCULAR; INTRAVENOUS; SUBCUTANEOUS at 05:55

## 2025-01-22 RX ADMIN — METFORMIN HYDROCHLORIDE 500 MG: 500 TABLET ORAL at 09:01

## 2025-01-22 RX ADMIN — ACETAMINOPHEN 1000 MG: 500 TABLET, FILM COATED ORAL at 11:34

## 2025-01-22 RX ADMIN — ALOGLIPTIN 12.5 MG: 12.5 TABLET, FILM COATED ORAL at 09:01

## 2025-01-22 RX ADMIN — METOPROLOL TARTRATE 50 MG: 50 TABLET, FILM COATED ORAL at 09:01

## 2025-01-22 RX ADMIN — APIXABAN 5 MG: 5 TABLET, FILM COATED ORAL at 09:01

## 2025-01-22 RX ADMIN — SENNOSIDES AND DOCUSATE SODIUM 1 TABLET: 8.6; 5 TABLET ORAL at 09:01

## 2025-01-22 RX ADMIN — PANTOPRAZOLE SODIUM 40 MG: 40 TABLET, DELAYED RELEASE ORAL at 05:55

## 2025-01-22 RX ADMIN — GABAPENTIN 600 MG: 300 CAPSULE ORAL at 09:00

## 2025-01-22 RX ADMIN — DULOXETINE HYDROCHLORIDE 60 MG: 60 CAPSULE, DELAYED RELEASE ORAL at 09:01

## 2025-01-22 NOTE — PROGRESS NOTES
Hospitalist Progress Note   Admit Date:  2025 10:32 AM   Name:  Maricel Gallego   Age:  59 y.o.  Sex:  female  :  1965   MRN:  [id number]   Room:  [facility]/    Presenting/Chief Complaint: No chief complaint on file.     Reason(s) for Admission: Bacterial infection of knee joint [M00.869]  Infection of left knee [M00.9]     Hospital Course:   Maricel Gallego is a 59 y.o. female with medical history of HTN, T2DM, HL, neuropathic pain, obesity, facial hemangioma who presented with L knee septic arthritis requiring I&D with Dr Lindo of orthopedics on 2024. Hospitalist following for assistance with medical management. Wound culture growing MRSA and ID was consulted. Did have an in office arthrocentesis on 1/3/2024 with culture showing enterobacter cloacae. No blood cultures were obtained. Patient placed on cefazolin 2g IV q8h with planned end of treatment 2/10/2025. She is currently pending discharge to a facility in [facility] and is waiting for insurance approval. She is medically ready for discharge.    Subjective & 24hr Events:   Seen at bedside. No complaints. Did have a BM this AM. Pain controlled. No fevers. Discussed plan of care. She is worried about    Assessment & Plan:     Principal Problem:    MRSA/bacterial infection of knee joint s/p I&D with Dr Lindo/ortho  - continue cefazolin 2g TID with EOT 2/10/2025  - will need PICC placement prior to d/c  - WBAT, LLE - PT, DVT ppx, and dispo planning per ortho    Active Problems:    Essential hypertension  - continue lopressor      Type 2 diabetes mellitus with diabetic polyneuropathy, without long-term current use of insulin (HCC), with most recent A1c 6.4  - continue algoliptin, glucose has been acceptable    Obesity with BMI 36 kg/  - increases overall morbidity and mortality    Anemia  - Hgb dropped to 8.7 on  from 10.7, will recheck today    Anticipated Discharge Arrangements:   Skilled Nursing Facility    PT/OT evals ordered?  
       Hospitalist Progress Note   Admit Date:  2025 10:32 AM   Name:  Maricel Gallego   Age:  59 y.o.  Sex:  female  :  1965   MRN:  [id number]   Room:  [facility]/    Presenting/Chief Complaint: No chief complaint on file.     Reason(s) for Admission: Bacterial infection of knee joint [M00.869]  Infection of left knee [M00.9]     Hospital Course:   Maricel Gallego is a 59 y.o. female with medical history of  hypertension, type 2 diabetes, hyperlipidemia, neuropathic pain, obesity, facial hemangioma; here for left knee septic arthritis requiring an I&D with orthopedics; Dr. Lindo 24. Hospitalist were asked to see in consultation for medical management     Patient seen at the bedside; no complaints.  Female family friend at bedside.  Patient reports no nausea, vomiting, diarrhea, abdominal pain, chest pain, fever/chills  Patient is aware that with infection, blood sugars can elevate.  She is a type II diabetic.  She is currently on 2 oral agents.  I will also asked staff to check her blood sugars 4 times a day and cover with sliding scale insulin if needed  Diabetic diet has been ordered  Wound cultures were taking during procedure today and are pending -defer antibiotic treatment to Ortho  All questions were asked at the bedside.  Patient is resting comfortably      Subjective & 24hr Events:   Patient reports she is feeling better today.  Has a good appetite.  Tells me about her poor experiences of care at [facility] as well as [facility] but she is currently very satisfied with her care here at [facility].       Assessment & Plan:     Principal Problem:  Possible left knee septic arthritis versus hemarthrosis  -Culture from office with Enterobacter cloacae  -Status post I&D 2025 by Dr. Lindo/orthopedic surgery  -Infectious disease following and guiding antibiotic therapy currently on IV cefepime  -Follow intraoperative cultures  -PT OT recommending 
       Hospitalist Progress Note   Admit Date:  2025 10:32 AM   Name:  Maricel Gallego   Age:  59 y.o.  Sex:  female  :  1965   MRN:  [id number]   Room:  [facility]/    Presenting/Chief Complaint: No chief complaint on file.     Reason(s) for Admission: Bacterial infection of knee joint [M00.869]  Infection of left knee [M00.9]     Hospital Course:   Maricel Gallego is a 59 y.o. female with medical history of  hypertension, type 2 diabetes, hyperlipidemia, neuropathic pain, obesity, facial hemangioma; here for left knee septic arthritis requiring an I&D with orthopedics; Dr. Lindo 24. Hospitalist were asked to see in consultation for medical management     Patient seen at the bedside; no complaints.  Female family friend at bedside.  Patient reports no nausea, vomiting, diarrhea, abdominal pain, chest pain, fever/chills  Patient is aware that with infection, blood sugars can elevate.  She is a type II diabetic.  She is currently on 2 oral agents.  I will also asked staff to check her blood sugars 4 times a day and cover with sliding scale insulin if needed  Diabetic diet has been ordered  Wound cultures were taking during procedure today and are pending -defer antibiotic treatment to Ortho  All questions were asked at the bedside.  Patient is resting comfortably    Subjective & 24hr Events:   Patient resting in bed.  No overnight complaints or concerns.  Blood pressure 119/81. Not requiring oxygen.  No AM labs for review.   Urine culture returned negative  Wound culture growing scant staphylococcal aureus; MRSA.   Needs PICC placed, ordered   ID is recommending cefazolin 2 g IV every 8 hours with an end treatment date of 2/10/2025 , weekly labs, and follow-up in their office on 2/10.  See below  She denies any chest pain, abdominal pain, nausea/vomiting/diarrhea, fever/chills.  Patient is eating, drinking, urinating and accepting of care.  She is medically cleared for discharge when bed is 
    2025         Post Op day: 1 Day Post-Op     Admit Date: 2025    Admit Diagnosis: Bacterial infection of knee joint [M00.869]  Infection of left knee [M00.9]        Subjective: Patient stable.No acute events.      Objective:     Vitals:    25 0650   BP:    Pulse:    Resp: 18   Temp:    SpO2:     Temp (24hrs), Av.7 °F (36.5 °C), Min:97.3 °F (36.3 °C), Max:98 °F (36.7 °C)      Lab Results   Component Value Date/Time    HGB 8.7 2025 03:36 AM       Patient Active Problem List   Diagnosis    Essential hypertension    Mixed hyperlipidemia    Vitamin D deficiency    Contact dermatitis    Weight gain    Facial hemangioma    Morbid obesity with BMI of 50.0-59.9, adult    Shortness of breath on exertion    Acute cystitis with hematuria    Balance problems    Birth yokasta    Capillary malformation    Dermatochalasis of both upper eyelids    GERD (gastroesophageal reflux disease)    Lesion of right lower eyelid    Localized edema    Myogenic ptosis of eyelid of both eyes    Onychomycosis of left great toe    Polyp of colon    Panic attacks    Viral URI with cough    Venous stasis dermatitis of both lower extremities    Hemorrhage of varicose veins of both lower extremities    Skin tag    Pruritus    Type 2 diabetes mellitus with diabetic polyneuropathy, without long-term current use of insulin (HCC)    Acute medial meniscus tear of right knee    Arthritis of right knee    Polyarthralgia    Family history of colon cancer    Body mass index [BMI] 37.0-37.9, adult (Z68.37)    Bacterial infection of knee joint    Infection of left knee       Current Facility-Administered Medications   Medication Dose Route Frequency    apixaban (ELIQUIS) tablet 5 mg  5 mg Oral BID    DULoxetine (CYMBALTA) extended release capsule 60 mg  60 mg Oral BID    gabapentin (NEURONTIN) capsule 600 mg  600 mg Oral BID    hydrOXYzine HCl (ATARAX) tablet 25 mg  25 mg Oral Q8H PRN    metFORMIN (GLUCOPHAGE) tablet 500 mg  500 mg 
    2025         Post Op day: 3 Days Post-Op     Admit Date: 2025    Admit Diagnosis: Bacterial infection of knee joint [M00.869]  Infection of left knee [M00.9]        Subjective: Patient stable. No acute events.      Objective:     Vitals:    25 0715   BP: 118/76   Pulse: (!) 105   Resp: 16   Temp: 98.8 °F (37.1 °C)   SpO2: 93%    Temp (24hrs), Av.6 °F (37 °C), Min:98.4 °F (36.9 °C), Max:98.8 °F (37.1 °C)      Lab Results   Component Value Date/Time    HGB 8.7 2025 03:36 AM       Patient Active Problem List   Diagnosis    Essential hypertension    Mixed hyperlipidemia    Vitamin D deficiency    Contact dermatitis    Weight gain    Facial hemangioma    Morbid obesity with BMI of 50.0-59.9, adult    Shortness of breath on exertion    Acute cystitis with hematuria    Balance problems    Birth yokasta    Capillary malformation    Dermatochalasis of both upper eyelids    GERD (gastroesophageal reflux disease)    Lesion of right lower eyelid    Localized edema    Myogenic ptosis of eyelid of both eyes    Onychomycosis of left great toe    Polyp of colon    Panic attacks    Viral URI with cough    Venous stasis dermatitis of both lower extremities    Hemorrhage of varicose veins of both lower extremities    Skin tag    Pruritus    Type 2 diabetes mellitus with diabetic polyneuropathy, without long-term current use of insulin (HCC)    Acute medial meniscus tear of right knee    Arthritis of right knee    Polyarthralgia    Family history of colon cancer    Body mass index [BMI] 37.0-37.9, adult (Z68.37)    Bacterial infection of knee joint    Infection of left knee       Current Facility-Administered Medications   Medication Dose Route Frequency    vancomycin (VANCOCIN) 1500 mg in sodium chloride 0.9 % 250 mL IVPB  1,500 mg IntraVENous Q12H    apixaban (ELIQUIS) tablet 5 mg  5 mg Oral BID    DULoxetine (CYMBALTA) extended release capsule 60 mg  60 mg Oral BID    gabapentin (NEURONTIN) capsule 
    2025         Post Op day: 4 Days Post-Op     Admit Date: 2025    Admit Diagnosis: Bacterial infection of knee joint [M00.869]  Infection of left knee [M00.9]        Subjective: Patient stable.No acute events.      Objective:     Vitals:    25   BP: 120/76   Pulse: 84   Resp: 16   Temp: 98.2 °F (36.8 °C)   SpO2: 93%    Temp (24hrs), Av.5 °F (36.9 °C), Min:98.2 °F (36.8 °C), Max:98.8 °F (37.1 °C)      Lab Results   Component Value Date/Time    HGB 8.7 2025 03:36 AM       Patient Active Problem List   Diagnosis    Essential hypertension    Mixed hyperlipidemia    Vitamin D deficiency    Contact dermatitis    Weight gain    Facial hemangioma    Morbid obesity with BMI of 50.0-59.9, adult    Shortness of breath on exertion    Acute cystitis with hematuria    Balance problems    Birth yokasta    Capillary malformation    Dermatochalasis of both upper eyelids    GERD (gastroesophageal reflux disease)    Lesion of right lower eyelid    Localized edema    Myogenic ptosis of eyelid of both eyes    Onychomycosis of left great toe    Polyp of colon    Panic attacks    Viral URI with cough    Venous stasis dermatitis of both lower extremities    Hemorrhage of varicose veins of both lower extremities    Skin tag    Pruritus    Type 2 diabetes mellitus with diabetic polyneuropathy, without long-term current use of insulin (HCC)    Acute medial meniscus tear of right knee    Arthritis of right knee    Polyarthralgia    Family history of colon cancer    Body mass index [BMI] 37.0-37.9, adult (Z68.37)    Bacterial infection of knee joint    Infection of left knee       Current Facility-Administered Medications   Medication Dose Route Frequency    ceFAZolin (ANCEF) 2,000 mg in sterile water 20 mL IV syringe  2,000 mg IntraVENous Q8H    apixaban (ELIQUIS) tablet 5 mg  5 mg Oral BID    DULoxetine (CYMBALTA) extended release capsule 60 mg  60 mg Oral BID    gabapentin (NEURONTIN) capsule 600 mg  600 
    2025         Post Op day: 7 Days Post-Op     Admit Date: 2025    Admit Diagnosis: Bacterial infection of knee joint [M00.869]  Infection of left knee [M00.9]        Subjective: Patient stable.No acute events.      Objective:     Vitals:    25   BP: 113/67   Pulse: 79   Resp: 20   Temp: 98.6 °F (37 °C)   SpO2: 98%    Temp (24hrs), Av.1 °F (36.7 °C), Min:97.5 °F (36.4 °C), Max:98.6 °F (37 °C)      Lab Results   Component Value Date/Time    HGB 9.2 2025 11:15 AM       Patient Active Problem List   Diagnosis    Essential hypertension    Mixed hyperlipidemia    Vitamin D deficiency    Contact dermatitis    Weight gain    Facial hemangioma    Morbid obesity with BMI of 50.0-59.9, adult    Shortness of breath on exertion    Acute cystitis with hematuria    Balance problems    Birth yokasta    Capillary malformation    Dermatochalasis of both upper eyelids    GERD (gastroesophageal reflux disease)    Lesion of right lower eyelid    Localized edema    Myogenic ptosis of eyelid of both eyes    Onychomycosis of left great toe    Polyp of colon    Panic attacks    Viral URI with cough    Venous stasis dermatitis of both lower extremities    Hemorrhage of varicose veins of both lower extremities    Skin tag    Pruritus    Type 2 diabetes mellitus with diabetic polyneuropathy, without long-term current use of insulin (HCC)    Acute medial meniscus tear of right knee    Arthritis of right knee    Polyarthralgia    Family history of colon cancer    Body mass index [BMI] 37.0-37.9, adult (Z68.37)    Bacterial infection of knee joint    Infection of left knee       Current Facility-Administered Medications   Medication Dose Route Frequency    ceFAZolin (ANCEF) 2,000 mg in sterile water 20 mL IV syringe  2,000 mg IntraVENous Q8H    apixaban (ELIQUIS) tablet 5 mg  5 mg Oral BID    DULoxetine (CYMBALTA) extended release capsule 60 mg  60 mg Oral BID    gabapentin (NEURONTIN) capsule 600 mg  600 mg 
    2025         Post Op day: 9 Days Post-Op     Admit Date: 2025    Admit Diagnosis: Bacterial infection of knee joint [M00.869]  Infection of left knee [M00.9]        Subjective: Patient stable.No acute events.      Objective:     Vitals:    25   BP: 97/63   Pulse: 85   Resp: 18   Temp: 98.2 °F (36.8 °C)   SpO2: 96%    Temp (24hrs), Av.1 °F (36.7 °C), Min:97.9 °F (36.6 °C), Max:98.2 °F (36.8 °C)      Lab Results   Component Value Date/Time    HGB 9.2 2025 11:15 AM       Patient Active Problem List   Diagnosis    Essential hypertension    Mixed hyperlipidemia    Vitamin D deficiency    Contact dermatitis    Weight gain    Facial hemangioma    Morbid obesity with BMI of 50.0-59.9, adult    Shortness of breath on exertion    Acute cystitis with hematuria    Balance problems    Birth yokasta    Capillary malformation    Dermatochalasis of both upper eyelids    GERD (gastroesophageal reflux disease)    Lesion of right lower eyelid    Localized edema    Myogenic ptosis of eyelid of both eyes    Onychomycosis of left great toe    Polyp of colon    Panic attacks    Viral URI with cough    Venous stasis dermatitis of both lower extremities    Hemorrhage of varicose veins of both lower extremities    Skin tag    Pruritus    Type 2 diabetes mellitus with diabetic polyneuropathy, without long-term current use of insulin (HCC)    Acute medial meniscus tear of right knee    Arthritis of right knee    Polyarthralgia    Family history of colon cancer    Body mass index [BMI] 37.0-37.9, adult (Z68.37)    Bacterial infection of knee joint    Infection of left knee       Current Facility-Administered Medications   Medication Dose Route Frequency    ceFAZolin (ANCEF) 2,000 mg in sterile water 20 mL IV syringe  2,000 mg IntraVENous Q8H    apixaban (ELIQUIS) tablet 5 mg  5 mg Oral BID    DULoxetine (CYMBALTA) extended release capsule 60 mg  60 mg Oral BID    gabapentin (NEURONTIN) capsule 600 mg  600 
   01/13/25 1455   Oxygen Therapy/Pulse Ox   O2 Therapy Oxygen   O2 Device Nasal cannula   Pulse 83   SpO2 93 %     Patient is eating and unable to perform IS. Patient encouraged to do 10 breaths every hour while awake-patient agreed. No shortness of breath or distress noted. BS are clear b/l.   [facility] notes reviewed- continuous Sat monitor order noted HS.  
  [facility]        2025         Post Op day: 5 Days Post-Op Procedure(s) (LRB):  Left LEG DEBRIDEMENT INCISION AND DRAINAGE (Left)      Admit Date: 2025  Admit Diagnosis: Bacterial infection of knee joint [M00.869]  Infection of left knee [M00.9]       Principle Problem: Bacterial infection of knee joint.           Subjective: Doing well, No complaints, No SOB, No Chest Pain, No Nausea or Vomiting     Objective:   Vital Signs are Stable, No Acute Distress, Alert,  Dressing is Dry,  Neurovascular exam is normal.     Assessment / Plan :  Patient Active Problem List   Diagnosis    Essential hypertension    Mixed hyperlipidemia    Vitamin D deficiency    Contact dermatitis    Weight gain    Facial hemangioma    Morbid obesity with BMI of 50.0-59.9, adult    Shortness of breath on exertion    Acute cystitis with hematuria    Balance problems    Birth yokasta    Capillary malformation    Dermatochalasis of both upper eyelids    GERD (gastroesophageal reflux disease)    Lesion of right lower eyelid    Localized edema    Myogenic ptosis of eyelid of both eyes    Onychomycosis of left great toe    Polyp of colon    Panic attacks    Viral URI with cough    Venous stasis dermatitis of both lower extremities    Hemorrhage of varicose veins of both lower extremities    Skin tag    Pruritus    Type 2 diabetes mellitus with diabetic polyneuropathy, without long-term current use of insulin (HCC)    Acute medial meniscus tear of right knee    Arthritis of right knee    Polyarthralgia    Family history of colon cancer    Body mass index [BMI] 37.0-37.9, adult (Z68.37)    Bacterial infection of knee joint    Infection of left knee    Patient Vitals for the past 8 hrs:   BP Temp Temp src Pulse Resp SpO2   25 0711 120/78 97.7 °F (36.5 °C) Oral 67 14 93 %    Temp (24hrs), Av.7 °F (36.5 °C), Min:97.7 °F (36.5 °C), Max:97.7 °F (36.5 °C)    Body mass index is 36.02 kg/m².    Lab Results   Component Value 
4 Eyes Skin Assessment     NAME:  Maricel Gallego  YOB: 1965  MEDICAL RECORD NUMBER:  [id number]    The patient is being assessed for  Post-Op Surgical    I agree that at least one RN has performed a thorough Head to Toe Skin Assessment on the patient. ALL assessment sites listed below have been assessed.      Areas assessed by both nurses:    Head, Face, Ears, Shoulders, Back, Chest, Arms, Elbows, Hands, Sacrum. Buttock, Coccyx, Ischium, Legs. Feet and Heels, and Under Medical Devices         Does the Patient have a Wound? No noted wound(s)       Micah Prevention initiated by RN: Yes  Wound Care Orders initiated by RN: No    Pressure Injury (Stage 3,4, Unstageable, DTI, NWPT, and Complex wounds) if present, place Wound referral order by RN under : No    New Ostomies, if present place, Ostomy referral order under : No     Nurse 1 eSignature: Electronically signed by ELEANOR CARTER RN on 1/13/25 at 2:37 PM EST    **SHARE this note so that the co-signing nurse can place an eSignature**    Nurse 2 eSignature: Electronically signed by Claritza Dela Cruz RN on 1/13/25 at 2:38 PM EST   
ACUTE PHYSICAL THERAPY GOALS:   (Developed with and agreed upon by patient and/or caregiver.)  DISCHARGE GOALS :  (1.)Ms. Gallego will move from supine to sit and sit to supine  with STAND BY ASSIST.  (2.)Ms. Gallego will transfer from bed to chair and chair to bed with CONTACT GUARD ASSIST using slide board.  (3.)Ms. Gallego will perform HEP 3-4 x a day with written guidelines.  4) pt out of bed for all meals.  ____________________________________     PHYSICAL THERAPY Daily Note and AM  (Link to Caseload Tracking: PT Visit Days : 2  Acknowledge Orders  Time In/Out  PT Charge Capture  Rehab Caseload Tracker    Maricel Gallego is a 59 y.o. female   PRIMARY DIAGNOSIS: Bacterial infection of knee joint  Bacterial infection of knee joint [M00.869]  Infection of left knee [M00.9]  Procedure(s) (LRB):  Left LEG DEBRIDEMENT INCISION AND DRAINAGE (Left)  1 Day Post-Op  Reason for Referral: Generalized Muscle Weakness (M62.81)  Other lack of cordination (R27.8)  Outpatient in a bed: [place] / [place] / Product Type: *No Product type* /     ASSESSMENT:     REHAB RECOMMENDATIONS:   Recommendation to date pending progress:  Setting:  Inpatient Rehab Facility  [facility]    Equipment:    To Be Determined     ASSESSMENT:  Ms. Gallego presented as alert & oriented followed cues & participated well. Pt is s/p I&D left knee but has reportedly been non-ambulatory for the last 4 months due to LE progressive weakness & likely disuse atrophy. Pt does have 12 hr a day assist for personal & household care. Pt was unsafe to WB during evaluation due to in- adequate muscle activity in LE's, so pt worked on slide board transfer mechanics. This pt has potential to be more functional, at least from a WC level ,& will benefit from follow up therapy. On hospital DC would recommend a more aggressive & structure rehab approach in an acute in-pt rehab facility like [facility] PT will try to progress this pt's the level of 
ACUTE PHYSICAL THERAPY GOALS:   (Developed with and agreed upon by patient and/or caregiver.)  DISCHARGE GOALS :  (1.)Ms. Gallego will move from supine to sit and sit to supine  with STAND BY ASSIST.  (2.)Ms. Gallego will transfer from bed to chair and chair to bed with CONTACT GUARD ASSIST using slide board.  (3.)Ms. Gallego will perform HEP 3-4 x a day with written guidelines.  4) pt out of bed for all meals.  ____________________________________     PHYSICAL THERAPY Daily Note and AM  (Link to Caseload Tracking: PT Visit Days : 3  Acknowledge Orders  Time In/Out  PT Charge Capture  Rehab Caseload Tracker    Maricel Gallego is a 59 y.o. female   PRIMARY DIAGNOSIS: Bacterial infection of knee joint  Bacterial infection of knee joint [M00.869]  Infection of left knee [M00.9]  Procedure(s) (LRB):  Left LEG DEBRIDEMENT INCISION AND DRAINAGE (Left)  2 Days Post-Op  Reason for Referral: Generalized Muscle Weakness (M62.81)  Other lack of cordination (R27.8)  [place]: [place] / Product Type: *No Product type* /     ASSESSMENT:     REHAB RECOMMENDATIONS:   Recommendation to date pending progress:  Setting:  Inpatient Rehab Facility  [facility]    Equipment:    To Be Determined     ASSESSMENT:  Ms. Gallego presented as alert & oriented followed cues & participated well. Pt is s/p I&D left knee but has reportedly been non-ambulatory for the last 4 months due to LE progressive weakness & likely disuse atrophy. Pt does have 12 hr a day assist for personal & household care. Pt was unsafe to WB during evaluation due to in- adequate muscle activity in LE's, so pt worked on slide board transfer mechanics. This pt has potential to be more functional, at least from a WC level ,& will benefit from follow up therapy. On hospital DC would recommend a more aggressive & structure rehab approach in an acute in-pt rehab facility like [facility]. PT will try to progress this pt's the level of independence 
ACUTE PHYSICAL THERAPY GOALS:   (Developed with and agreed upon by patient and/or caregiver.)  DISCHARGE GOALS :  (1.)Ms. Gallego will move from supine to sit and sit to supine  with STAND BY ASSIST.  (2.)Ms. Gallego will transfer from bed to chair and chair to bed with CONTACT GUARD ASSIST using slide board.  (3.)Ms. Gallego will perform HEP 3-4 x a day with written guidelines.  4) pt out of bed for all meals.  ____________________________________     PHYSICAL THERAPY Daily Note and AM  (Link to Caseload Tracking: PT Visit Days : 5  Acknowledge Orders  Time In/Out  PT Charge Capture  Rehab Caseload Tracker    Maricel Gallego is a 59 y.o. female   PRIMARY DIAGNOSIS: Bacterial infection of knee joint  Bacterial infection of knee joint [M00.869]  Infection of left knee [M00.9]  Procedure(s) (LRB):  Left LEG DEBRIDEMENT INCISION AND DRAINAGE (Left)  4 Days Post-Op  Reason for Referral: Generalized Muscle Weakness (M62.81)  Other lack of cordination (R27.8)  [place] / Product Type: *No Product type* /     ASSESSMENT:     REHAB RECOMMENDATIONS:   Recommendation to date pending progress:  Setting:  Inpatient Rehab Facility  [facility]    Equipment:    To Be Determined     ASSESSMENT:  Ms. Gallego presented as alert & oriented followed cues & participated well. Pt is s/p I&D left knee but has reportedly been non-ambulatory for the last 4 months due to LE progressive weakness & likely disuse atrophy. Pt does have 12 hr a day assist for personal & household care. Pt was unsafe to WB during evaluation due to in- adequate muscle activity in LE's, so pt worked on slide board transfer mechanics. This pt has potential to be more functional, at least from a WC level ,& will benefit from follow up therapy. On hospital DC would recommend a more aggressive & structure rehab approach in an acute in-pt rehab facility like [facility]. PT will try to progress this pt's the level of independence 
ACUTE PHYSICAL THERAPY GOALS:   (Developed with and agreed upon by patient and/or caregiver.)  DISCHARGE GOALS :  (1.)Ms. Gallego will move from supine to sit and sit to supine  with STAND BY ASSIST.  (2.)Ms. Gallego will transfer from bed to chair and chair to bed with CONTACT GUARD ASSIST using slide board.  (3.)Ms. Gallego will perform HEP 3-4 x a day with written guidelines.  4) pt out of bed for all meals.  ____________________________________     PHYSICAL THERAPY Daily Note and PM  (Link to Caseload Tracking: PT Visit Days : 5  Acknowledge Orders  Time In/Out  PT Charge Capture  Rehab Caseload Tracker    Maricel Gallego is a 59 y.o. female   PRIMARY DIAGNOSIS: Bacterial infection of knee joint  Bacterial infection of knee joint [M00.869]  Infection of left knee [M00.9]  Procedure(s) (LRB):  Left LEG DEBRIDEMENT INCISION AND DRAINAGE (Left)  4 Days Post-Op  Reason for Referral: Generalized Muscle Weakness (M62.81)  Other lack of cordination (R27.8)  [place] / Product Type: *No Product type* /     ASSESSMENT:     REHAB RECOMMENDATIONS:   Recommendation to date pending progress:  Setting:  Inpatient Rehab Facility  [facility]    Equipment:    To Be Determined     ASSESSMENT:  Ms. Gallego presented as alert & oriented followed cues & participated well. Pt is s/p I&D left knee but has reportedly been non-ambulatory for the last 4 months due to LE progressive weakness & likely disuse atrophy. Pt does have 12 hr a day assist for personal & household care. Pt was unsafe to WB during evaluation due to in- adequate muscle activity in LE's, so pt worked on slide board transfer mechanics. This pt has potential to be more functional, at least from a WC level ,& will benefit from follow up therapy. On hospital DC would recommend a more aggressive & structure rehab approach in an acute in-pt rehab facility like [facility]. PT will try to progress this pt's the level of independence 
ACUTE PHYSICAL THERAPY GOALS:   (Developed with and agreed upon by patient and/or caregiver.)  DISCHARGE GOALS :  (1.)Ms. Gallego will move from supine to sit and sit to supine  with STAND BY ASSIST.  (2.)Ms. Gallego will transfer from bed to chair and chair to bed with CONTACT GUARD ASSIST using slide board.  (3.)Ms. Gallego will perform HEP 3-4 x a day with written guidelines.  4) pt out of bed for all meals.  ____________________________________     PHYSICAL THERAPY Initial Assessment and PM  (Link to Caseload Tracking: PT Visit Days : 1  Acknowledge Orders  Time In/Out  PT Charge Capture  Rehab Caseload Tracker    Maricel Gallego is a 59 y.o. female   PRIMARY DIAGNOSIS: Bacterial infection of knee joint  Bacterial infection of knee joint [M00.869]  Infection of left knee [M00.9]  Procedure(s) (LRB):  Left LEG DEBRIDEMENT INCISION AND DRAINAGE (Left)  Day of Surgery  Reason for Referral: Generalized Muscle Weakness (M62.81)  Other lack of cordination (R27.8)  Outpatient in a bed: [place] / Product Type: *No Product type* /     ASSESSMENT:     REHAB RECOMMENDATIONS:   Recommendation to date pending progress:  Setting:  Inpatient Rehab Facility  [facility]    Equipment:    To Be Determined     ASSESSMENT:  Ms. Gallego presented as alert & oriented followed cues & participated well. Pt is s/p I&D left knee but has reportedly been non-ambulatory for the last 4 months due to LE progressive weakness & likely disuse atrophy. Pt does have 12 hr a day assist for personal & household care. Pt was unsafe to WB during evaluation due to in- adequate muscle activity in LE's, so pt worked on slide board transfer mechanics. This pt has potential to be more functional, at least from a WC level ,& will benefit from follow up therapy. On hospital DC would recommend a more aggressive & structure rehab approach in an acute in-pt rehab facility like [facility]. PT will try to progress this pt's the level 
ACUTE PHYSICAL THERAPY GOALS:   (Developed with and agreed upon by patient and/or caregiver.)  DISCHARGE GOALS :  (1.)Ms. Gallego will move from supine to sit and sit to supine  with SUPERVISION within 5 treatment days.  (2.)Ms. Gallego will transfer between sitting and standing with ROLLING WALKER and moderate assist within 5 treatment days.  (3.)Ms. Gallego will perform HEP 3 x a day with written guidelines within 5 treatment days.    Goals updated 1/18/25  ____________________________________     PHYSICAL THERAPY Daily Note and AM  (Link to Caseload Tracking: PT Visit Days : 2  Acknowledge Orders  Time In/Out  PT Charge Capture  Rehab Caseload Tracker    Maricel Gallego is a 59 y.o. female   PRIMARY DIAGNOSIS: Bacterial infection of knee joint  Bacterial infection of knee joint [M00.869]  Infection of left knee [M00.9]  Procedure(s) (LRB):  Left LEG DEBRIDEMENT INCISION AND DRAINAGE (Left)  6 Days Post-Op  Reason for Referral: Generalized Muscle Weakness (M62.81)  Other lack of cordination (R27.8)  [place] / Product Type: *No Product type* /     ASSESSMENT:     REHAB RECOMMENDATIONS:   Recommendation to date pending progress:  Setting:  Inpatient Rehab Facility    Equipment:    To Be Determined     ASSESSMENT:  Ms. Gallego participated well today. Managing bed mobility without assist and did not need to use Ues to maneuver L LE.  Able to pull to standing at steady and use the steady to transfer patient to bathroom/shower chair.  Patient able to shower herself while seated.  A bit of assist needed to pull to standing from low shower chair back into steady but continues to do very well with it for sit <> stand transitions.  Still need to address more sit <> stand with walker to be able to address mobility/ambulation but patient is able to weight shift while supported in steady.  Patient very motivated to participate with therapy and wants as much therapy as she can 
ACUTE PHYSICAL THERAPY GOALS:   (Developed with and agreed upon by patient and/or caregiver.)  DISCHARGE GOALS :  (1.)Ms. Gallego will move from supine to sit and sit to supine  with SUPERVISION within 5 treatment days.  (2.)Ms. Gallego will transfer between sitting and standing with ROLLING WALKER and moderate assist within 5 treatment days.  (3.)Ms. Gallego will perform HEP 3 x a day with written guidelines within 5 treatment days.    Goals updated 1/18/25  ____________________________________     PHYSICAL THERAPY Daily Note and AM  (Link to Caseload Tracking: PT Visit Days : 4  Acknowledge Orders  Time In/Out  PT Charge Capture  Rehab Caseload Tracker    Maricel Gallego is a 59 y.o. female   PRIMARY DIAGNOSIS: Bacterial infection of knee joint  Bacterial infection of knee joint [M00.869]  Infection of left knee [M00.9]  Procedure(s) (LRB):  Left LEG DEBRIDEMENT INCISION AND DRAINAGE (Left)  8 Days Post-Op  Reason for Referral: Generalized Muscle Weakness (M62.81)  Other lack of cordination (R27.8)  [place] / Product Type: *No Product type* /     ASSESSMENT:     REHAB RECOMMENDATIONS:   Recommendation to date pending progress:  Setting:  Inpatient Rehab Facility    Equipment:    To Be Determined     ASSESSMENT:  Ms. Gallego participated well today. Performs B LE HEP see below that she can do throughout the day.  Supine>scooting>eob with Supervision and extra time.  Sat on eob and work on dynamic/static balance without support.   Therapist position steady so work on sit>stand and then transfer.  Work on sit><stand x 2 in the steady with SBA.  Stood last time and but paddles behind, so pt could rest.  While in the steady put work on shifting wt from L><R several times with SBA.  Work on standing tall and doing small marching in place got a little fatigue so rested few min.  Therapist move the steady over to the chair and pt slowly sat down .  Remain in the chair with needs 
ACUTE PHYSICAL THERAPY GOALS:   (Developed with and agreed upon by patient and/or caregiver.)  DISCHARGE GOALS :  (1.)Ms. Gallego will move from supine to sit and sit to supine  with SUPERVISION within 5 treatment days.  (2.)Ms. Gallego will transfer between sitting and standing with ROLLING WALKER and moderate assist within 5 treatment days.  (3.)Ms. Gallego will perform HEP 3 x a day with written guidelines within 5 treatment days.    Goals updated 1/18/25  ____________________________________     PHYSICAL THERAPY Daily Note and PM  (Link to Caseload Tracking: PT Visit Days : 3  Acknowledge Orders  Time In/Out  PT Charge Capture  Rehab Caseload Tracker    Maricel Gallego is a 59 y.o. female   PRIMARY DIAGNOSIS: Bacterial infection of knee joint  Bacterial infection of knee joint [M00.869]  Infection of left knee [M00.9]  Procedure(s) (LRB):  Left LEG DEBRIDEMENT INCISION AND DRAINAGE (Left)  7 Days Post-Op  Reason for Referral: Generalized Muscle Weakness (M62.81)  Other lack of cordination (R27.8)  [place] / Product Type: *No Product type* /     ASSESSMENT:     REHAB RECOMMENDATIONS:   Recommendation to date pending progress:  Setting:  Inpatient Rehab Facility    Equipment:    To Be Determined     ASSESSMENT:  Ms. Gallego participated well today.  Performs B LE see below with guidance.  Work on bed with  Supervision.  Therapist raised the bed up a little.  Pt practice sit><stand  x 2  CGA-SBA with steady in front and verbal cues for  hand placement.  Therapist put pad behind pt for her to rest.  Work on dynamic/static balance in the steady with CGA-SBA.   Therapist removed the pad and pt work on lower herself back on the bed nice and slow.  Sat on the eob few min.  Then return to supine with Supervision.  Pt work on scooting herself up in the bed with CGA-SBA.  Remain in the bed with needs in reach, ice to R knee and instructed to call for assist, before getting up. 
ACUTE PHYSICAL THERAPY GOALS:   (Developed with and agreed upon by patient and/or caregiver.)  DISCHARGE GOALS :  (1.)Ms. Gallego will move from supine to sit and sit to supine  with SUPERVISION within 5 treatment days.  (2.)Ms. Gallego will transfer between sitting and standing with ROLLING WALKER and moderate assist within 5 treatment days.  (3.)Ms. Gallego will perform HEP 3 x a day with written guidelines within 5 treatment days.    Goals updated 1/18/25  ____________________________________     PHYSICAL THERAPY Daily Note and PM  (Link to Caseload Tracking: PT Visit Days : 4  Acknowledge Orders  Time In/Out  PT Charge Capture  Rehab Caseload Tracker    Maricel Gallego is a 59 y.o. female   PRIMARY DIAGNOSIS: Bacterial infection of knee joint  Bacterial infection of knee joint [M00.869]  Infection of left knee [M00.9]  Procedure(s) (LRB):  Left LEG DEBRIDEMENT INCISION AND DRAINAGE (Left)  8 Days Post-Op  Reason for Referral: Generalized Muscle Weakness (M62.81)  Other lack of cordination (R27.8)  [place] / Product Type: *No Product type* /     ASSESSMENT:     REHAB RECOMMENDATIONS:   Recommendation to date pending progress:  Setting:  Inpatient Rehab Facility    Equipment:    To Be Determined     ASSESSMENT:  Ms. Gallego participated well today. Performs B LE HEP see below that she can do throughout the day.  Supine>scooting>eob with Supervision and extra time.  Sat on eob and work on dynamic/static balance without support.   Therapist position steady so work on sit>stand and then transfer.  Work on sit><stand x 2 in the steady with SBA.  Stood last time and but paddles behind, so pt could rest.  While in the steady put work on shifting wt from L><R several times with SBA.  Work on standing tall and doing small marching in place got a little fatigue so rested few min.  Therapist move the steady over to the chair and pt slowly sat down .  Remain in the chair with needs 
Infectious Diseases Progess Note    Today's Date: 1/15/2025   Admit Date: 2025  : 1965    Impression/Plan   Possible L knee native joint septic arthritis vs hemarthrosis  Cultures from the office with enterobacter cloacae  S/p I&D 25  Fluid with 3.7 million RBC's and only 3,600 wbc's (95% PMNs).  Cultures now with Staph aureus in a few cultures with sens pending  Given the current cultures, we will add vanc and await sens  We will continue cefepime awaiting whether or not the proteus will recur.   She will now need a PICC line before she goes to a SNF.   DM II w/o LTUI: A1C recently 6.4  Obesity with BMI 36    Anti-infectives:   Cefepime  -  Resume Vanc 1/15 -   Received vanc and cefazolin pre-op    Subjective and 24 hour events:     No new c/o's today. She is tearful over the news of the cultures, but I tried to reassure her that this can be treated.     No Known Allergies     Objective:     Visit Vitals  /67   Pulse 85   Temp 98.6 °F (37 °C)   Resp 16   Ht 1.702 m (5' 7\")   Wt 104.3 kg (230 lb)   SpO2 98%   BMI 36.02 kg/m²     Temp (24hrs), Av.3 °F (36.8 °C), Min:98.1 °F (36.7 °C), Max:98.6 °F (37 °C)       Intake/Output Summary (Last 24 hours) at 1/15/2025 0903  Last data filed at 1/15/2025 0624  Gross per 24 hour   Intake --   Output 800 ml   Net -800 ml       Patient examined on 1/15/2025 and, apart from changes noted below, exam was unchanged from previously documented.    Physical Exam:   General:  NAD  CV:   RRR, no M/R/G  Lungs:  No W/R/R. Symmetric expansion  Abdomen:  Appears soft, NT, ND  Extremities: L knee in large surgical brace/wrap  Skin:   No rashes, normal coloration, warm and dry  Psych:  Normal mood and affect    Lines:        Data Review:   I have personally reviewed labs, micro, and other relevant tests:    Labs: Results:       BMP, Mg, Phos No results for input(s): \"NA\", \"K\", \"CL\", \"CO2\", \"ANIONGAP\", \"BUN\", \"CREATININE\", \"LABGLOM\", \"GFRAA\", \"CALCIUM\", 
Infectious Diseases Progess Note    Today's Date: 2025   Admit Date: 2025  : 1965    Impression/Plan   L knee native joint septic arthritis with hemarthrosis  Cultures from the office with enterobacter cloacae... likely contaminant  S/p I&D 25  Fluid with 3.7 million RBC's and only 3,600 wbc's (95% PMNs).  Cultures now with MSSA in a few cultures that are now complete and not showing the e. Cloace  Plan is cefazolin 2 grams IV q 8 hours with EOT 2/10/25.  She will need a cbc with diff, crp, and creatinine weekly while on the cefazolin.  I would not plan on oral antibiotics after po if her numbers look good at the EOT.   She has f/u in our office on 2/10/25 at 8:40 am with Dr. Natasha GODOY d/w the patient and her friend, who is a retired nurse. All ?'s answered.  PICC ordered yesterday, but not yet placed.   ID will sign off.  DM II w/o LTUI: A1C recently 6.4  Obesity with BMI 36    Anti-infectives:   Cefazolin  -  Cefepime  -   Resume Vanc 1/15 -   Received vanc and cefazolin pre-op    Subjective and 24 hour events:     No c/o's today. In better spirits anticipating dc.     No Known Allergies     Objective:     Visit Vitals  /81   Pulse 75   Temp 97.9 °F (36.6 °C) (Oral)   Resp 18   Ht 1.702 m (5' 7\")   Wt 104.3 kg (230 lb)   SpO2 97%   BMI 36.02 kg/m²     Temp (24hrs), Av.1 °F (36.7 °C), Min:97.9 °F (36.6 °C), Max:98.2 °F (36.8 °C)       Intake/Output Summary (Last 24 hours) at 2025 0819  Last data filed at 2025 0527  Gross per 24 hour   Intake --   Output 900 ml   Net -900 ml       Patient examined on 2025 and, apart from changes noted below, exam was unchanged from previously documented.    Physical Exam:   General:  NAD  CV:   RRR, no M/R/G  Lungs:  No W/R/R. Symmetric expansion  Abdomen:  Appears soft, NT, ND  Extremities: No cyanosis or clubbing, no edema  Skin:   No rashes, normal coloration, warm and dry  Psych:  Normal mood and affect    Lines:    
Infectious Diseases Progess Note    Today's Date: 2025   Admit Date: 2025  : 1965    Impression/Plan   L knee native joint septic arthritis with hemarthrosis  Cultures from the office with enterobacter cloacae... likely contaminant  S/p I&D 25  Fluid with 3.7 million RBC's and only 3,600 wbc's (95% PMNs).  Cultures now with MSSA in a few cultures that are now complete and not showing the e. cloace  DC vanc and cefepime and move to cefazolin. The final drug will depend on what the SNF can do.   Best option is cefazoline 2 grams q8, but if they cannot do that, then consider vanc if the dosing would be q 12 or daily as a \"plan B\"  She will now need a PICC line before she goes to a SNF.   I would plan on 4 weeks total from the time of I&D.  DM II w/o LTUI: A1C recently 6.4  Obesity with BMI 36    Anti-infectives:   Cefazolin  -  Cefepime  -   Resume Vanc 1/15 -   Received vanc and cefazolin pre-op    Subjective and 24 hour events:     Seen with PT. She remains tearful simply due to the gravity of everything she has been through in the last 6 months.    No Known Allergies     Objective:     Visit Vitals  /76   Pulse (!) 105   Temp 98.8 °F (37.1 °C)   Resp 16   Ht 1.702 m (5' 7\")   Wt 104.3 kg (230 lb)   SpO2 93%   BMI 36.02 kg/m²     Temp (24hrs), Av.6 °F (37 °C), Min:98.4 °F (36.9 °C), Max:98.8 °F (37.1 °C)       Intake/Output Summary (Last 24 hours) at 2025 0937  Last data filed at 1/15/2025 2011  Gross per 24 hour   Intake --   Output 700 ml   Net -700 ml       Patient examined on 2025 and, apart from changes noted below, exam was unchanged from previously documented.    Physical Exam:   General:  NAD  CV:   RRR, no M/R/G  Lungs:  No W/R/R. Symmetric expansion  Abdomen:  Appears soft, NT, ND  Extremities: Vac on knee on L  Skin:   No rashes, normal coloration, warm and dry  Psych:  Normal mood and affect    Lines:        Data Review:   I have personally 
Medication hold clearance received via fax, scanned into Media if needed for reference.    
OCCUPATIONAL THERAPY Daily Note and AM      (Link to Caseload Tracking: OT Visit Days: 2  OT Orders   Time  OT Charge Capture  Rehab Caseload Tracker  Episode     Maricel Gallego is a 59 y.o. female   PRIMARY DIAGNOSIS: Bacterial infection of knee joint  Bacterial infection of knee joint [M00.869]  Infection of left knee [M00.9]  Procedure(s) (LRB):  Left LEG DEBRIDEMENT INCISION AND DRAINAGE (Left)  4 Days Post-Op  Reason for Referral: Pain in Left Knee (M25.562)  Stiffness of Left Knee, Not elsewhere classified (M25.662)  Generalized Muscle Weakness (M62.81)  Difficulty in walking, Not elsewhere classified (R26.2)  [place] / Product Type: *No Product type* /     ASSESSMENT:     REHAB RECOMMENDATIONS:   Recommendation to date pending progress:  Setting:  Inpatient Rehab Facility     Equipment:    To Be Determined     ASSESSMENT:  Ms. Gallego is s/p left leg/knee debridement and drainage. Per chart and pt she was independent with all mobility and self care. Pt was at [facility] in October and went to [facility] after that and never regained her independence. She has a private caregiver from 9 am to 9 pm. This pt has been non ambulatory and is hopeful this surgery and subsequent treatment for infection will allow her to return to her prior level of independence. Pt is noted with generalized weakness (particularly bilateral LE), increased leg pain and overall decreased tolerance to minimal activity. Pt would benefit from OT to maximize safety and independence with self care and functional mobility. Pt could benefit from an inpatient rehab facility as she was independent 4 months ago, participates well and could tolerate an extended amount of therapy. OT will follow.      Patient seen in room with family member present. Pt worked on bed mobility, edge of bed sitting tolerance and sliding board transfer to and from bed to recliner. Pt should progress well.       1/17/2025  Pt seen in 
OCCUPATIONAL THERAPY Daily Note and AM      (Link to Caseload Tracking: OT Visit Days: 3  OT Orders   Time  OT Charge Capture  Rehab Caseload Tracker  Episode     Maricel Gallego is a 59 y.o. female   PRIMARY DIAGNOSIS: Bacterial infection of knee joint  Bacterial infection of knee joint [M00.869]  Infection of left knee [M00.9]  Procedure(s) (LRB):  Left LEG DEBRIDEMENT INCISION AND DRAINAGE (Left)  5 Days Post-Op  Reason for Referral: Pain in Left Knee (M25.562)  Stiffness of Left Knee, Not elsewhere classified (M25.662)  Generalized Muscle Weakness (M62.81)  Difficulty in walking, Not elsewhere classified (R26.2)  [place] / Product Type: *No Product type* /     ASSESSMENT:     REHAB RECOMMENDATIONS:   Recommendation to date pending progress:  Setting:  Inpatient Rehab Facility     Equipment:    To Be Determined     ASSESSMENT:  Ms. Gallego is s/p left leg/knee debridement and drainage. Per chart and pt she was independent with all mobility and self care. Pt was at [facility] in October and went to [facility] after that and never regained her independence. She has a private caregiver from 9 am to 9 pm. This pt has been non ambulatory and is hopeful this surgery and subsequent treatment for infection will allow her to return to her prior level of independence. Pt is noted with generalized weakness (particularly bilateral LE), increased leg pain and overall decreased tolerance to minimal activity. Pt would benefit from OT to maximize safety and independence with self care and functional mobility. Pt could benefit from an inpatient rehab facility as she was independent 4 months ago, participates well and could tolerate an extended amount of therapy. OT will follow.      Patient seen in room with family member present. Pt worked on bed mobility, edge of bed sitting tolerance and sliding board transfer to and from bed to recliner. Pt should progress well.       1/17/2025  AM: Pt seen in 
OCCUPATIONAL THERAPY Daily Note and AM      (Link to Caseload Tracking: OT Visit Days: 4  OT Orders   Time  OT Charge Capture  Rehab Caseload Tracker  Episode     Maricel Gallego is a 59 y.o. female   PRIMARY DIAGNOSIS: Bacterial infection of knee joint  Bacterial infection of knee joint [M00.869]  Infection of left knee [M00.9]  Procedure(s) (LRB):  Left LEG DEBRIDEMENT INCISION AND DRAINAGE (Left)  8 Days Post-Op  Reason for Referral: Pain in Left Knee (M25.562)  Stiffness of Left Knee, Not elsewhere classified (M25.662)  Generalized Muscle Weakness (M62.81)  Difficulty in walking, Not elsewhere classified (R26.2)  [place] / Product Type: *No Product type* /     ASSESSMENT:     REHAB RECOMMENDATIONS:   Recommendation to date pending progress:  Setting:  Inpatient Rehab Facility     Equipment:    To Be Determined     ASSESSMENT:  Ms. Gallego is s/p left leg/knee debridement and drainage. Per chart and pt she was independent with all mobility and self care. Pt was at [facility] in October and went to [facility] after that and never regained her independence. She has a private caregiver from 9 am to 9 pm. This pt has been non ambulatory and is hopeful this surgery and subsequent treatment for infection will allow her to return to her prior level of independence. Pt is noted with generalized weakness (particularly bilateral LE), increased leg pain and overall decreased tolerance to minimal activity. Pt would benefit from OT to maximize safety and independence with self care and functional mobility. Pt could benefit from an inpatient rehab facility as she was independent 4 months ago, participates well and could tolerate an extended amount of therapy. OT will follow.      Patient seen in room with family member present. Pt worked on bed mobility, edge of bed sitting tolerance and sliding board transfer to and from bed to recliner. Pt should progress well.       1/17/2025  AM: Pt seen in 
OCCUPATIONAL THERAPY Initial Assessment, Daily Note, and PM      (Link to Caseload Tracking: OT Visit Days: 1  OT Orders   Time  OT Charge Capture  Rehab Caseload Tracker  Episode     Maricel Gallego is a 59 y.o. female   PRIMARY DIAGNOSIS: Bacterial infection of knee joint  Bacterial infection of knee joint [M00.869]  Infection of left knee [M00.9]  Procedure(s) (LRB):  Left LEG DEBRIDEMENT INCISION AND DRAINAGE (Left)  Day of Surgery  Reason for Referral: Pain in Left Knee (M25.562)  Stiffness of Left Knee, Not elsewhere classified (M25.662)  Generalized Muscle Weakness (M62.81)  Difficulty in walking, Not elsewhere classified (R26.2)  Outpatient in a bed: [place] / [place] / Product Type: *No Product type* /     ASSESSMENT:     REHAB RECOMMENDATIONS:   Recommendation to date pending progress:  Setting:  Inpatient Rehab Facility     Equipment:    To Be Determined     ASSESSMENT:  Ms. Gallego is s/p left leg/knee debridement and drainage. Per chart and pt she was independent with all mobility and self care. Pt was at [facility] in October and went to [facility] after that and never regained her independence. She has a private caregiver from 9 am to 9 pm. This pt has been non ambulatory and is hopeful this surgery and subsequent treatment for infection will allow her to return to her prior level of independence. Pt is noted with generalized weakness (particularly bilateral LE), increased leg pain and overall decreased tolerance to minimal activity. Pt would benefit from OT to maximize safety and independence with self care and functional mobility. Pt could benefit from an inpatient rehab facility as she was independent 4 months ago, participates well and could tolerate an extended amount of therapy. OT will follow.      Patient seen in room with family member present. Pt worked on bed mobility, edge of bed sitting tolerance and sliding board transfer to and from bed to recliner. Pt should progress well. 
PICC Placement Note    PRE-PROCEDURE VERIFICATION    Correct Procedure: yes  Time out completed with assistant RIYA Huffman, RN, [facility] and all persons present in agreement with time out.  Risks and benefits reviewed with pt  and informed consent obtained prior to assessment and procedure.     Correct Site: yes  Temperature: Temp: 97.9 °F (36.6 °C), Temperature Source:    Recent Labs     01/16/25  0643   BUN 14     Allergies: Patient has no known allergies.  Education materials for PICC Care given to patient or family.    PROCEDURE DETAIL  A single lumen PICC line was started for Long term IV/antibiotic administration. The following documentation is in addition to the PICC properties in the lines/airways flowsheet:    Xylocaine used: Yes  Mid-Arm Circumference: 42 (cm)  Internal Catheter Length: 51 (cm)  External Catheter Length: 0 (cm)  Total Catheter Length: 51 (cm)  Vein Selection for PICC: left basilic  Central Line Insertion Bundle followed: Yes  Complication Related to Insertion: none    Both the insertion guidewire and ECG guidewire were removed intact all ports have positive blood return and were flush well with normal saline.    The location of the tip of the PICC is verified using ECG technology.  The tip is in the SVC per ECG reading.  See image below.             Line is okay to use: yes      Meggan Olea, RN, [facility]  
Pt received to Rm 324. Pt alert and oriented. Oriented pt to room and bed controls.   
Report called to Elma winter [facility].  
TRANSFER - IN REPORT:    Verbal report received from Ara brody Maricel Gallego  being received from PACU for routine post-op      Report consisted of patient's Situation, Background, Assessment and   Recommendations(SBAR).     Information from the following report(s) Nurse Handoff Report was reviewed with the receiving nurse.    Opportunity for questions and clarification was provided.      Assessment completed upon patient's arrival to unit and care assumed.    
VANCO DAILY FOLLOW UP NOTE  [facility]   Pharmacy Pharmacokinetic Monitoring Service - Vancomycin    Consulting Provider: Dr Venegas   Indication: BJI  Target Concentration: Goal AUC/PAPO 400-600 mg*hr/L  Day of Therapy: 1  Additional Antimicrobials: Cefepime    Pertinent Laboratory Values:   Wt Readings from Last 1 Encounters:   01/13/25 104.3 kg (230 lb)     Temp Readings from Last 1 Encounters:   01/15/25 98.6 °F (37 °C)     Recent Labs     01/15/25  1523   CREATININE 0.67     Estimated Creatinine Clearance: 110 mL/min (based on SCr of 0.67 mg/dL).    No results found for: \"VANCOTROUGH\", \"VANCORANDOM\"    MRSA Nasal Swab: N/A. Non-respiratory infection    Assessment:  Date/Time Dose Concentration AUC         Note: Serum concentrations collected for AUC dosing may appear elevated if collected in close proximity to the dose administered, this is not necessarily an indication of toxicity    Plan:  Dosing recommendations based on Bayesian software  Start vancomycin 2000 mg x 1 dose, then 1500 mg q12h  Anticipated AUC of 538 and trough concentration of 13.1 at steady state  Renal labs as indicated   Vancomycin concentration ordered for 1/16 @ 0600  Pharmacy will continue to monitor patient and adjust therapy as indicated      Thank you for the consult,    Leslie Painter, DorindaD    
Results   Component Value Date/Time    HGB 9.2 01/18/2025 11:15 AM          S/P Procedure(s) (LRB):  Left LEG DEBRIDEMENT INCISION AND DRAINAGE (Left)      Medical Mgmt per hospitalist  ID recs made for appropriate abx treatment- made adjustment to D/C summary as requested  Anticoagulation plan: Eliquis 5mg   Continue PT/OT as ordered  Fall Precautions  DC disp: SNF  with PICC   F/U: Per arranged  with [facility] Team        Signed By: CARSON Palacio - CNP  1/19/2025,  8:28 AM     
   []      COGNITION/  PERCEPTION: Intact Impaired (Comments):   Orientation [x]     Vision [x]     Hearing [x]     Cognition  [x]       MOBILITY: I Mod I S SBA CGA Min Mod Max Total  NT x2 Comments:   Bed Mobility    Rolling [] [] [] [] [] [] [] [] [] [] []    Supine to Sit [] [] [x] [] [] [] [] [] [] [] []    Scooting [] [] [x] [] [] [] [] [] [] [] []    Sit to Supine [] [] [] [] [] [] [] [] [] [] []    Transfers    Sit to Stand [] [] [] [] [x] [] [] [] [] [] [] In the steady    Bed to Chair [] [] [] [] [] [] [] [] [x] [] [] With steady   Stand to Sit [] [] [] [] [x] [] [] [] [] [] []     [] [] [] [] [] [] [] [] [] [] []    I=Independent, Mod I=Modified Independent, S=Supervision, SBA=Standby Assistance, CGA=Contact Guard Assistance,   Min=Minimal Assistance, Mod=Moderate Assistance, Max=Maximal Assistance, Total=Total Assistance, NT=Not Tested    GAIT: I Mod I S SBA CGA Min Mod Max Total  NT x2 Comments:   Level of Assistance [] [] [] [] [] [] [] [] [] [x] []    Distance       DME N/A    Gait Quality N/A    Weightbearing Status Restrictions/Precautions  Restrictions/Precautions: Weight Bearing  Lower Extremity Weight Bearing Restrictions  Left Lower Extremity Weight Bearing: Weight Bearing As Tolerated    Stairs      I=Independent, Mod I=Modified Independent, S=Supervision, SBA=Standby Assistance, CGA=Contact Guard Assistance,   Min=Minimal Assistance, Mod=Moderate Assistance, Max=Maximal Assistance, Total=Total Assistance, NT=Not Tested    PLAN:   FREQUENCY AND DURATION: Daily for duration of hospital stay or until stated goals are met, whichever comes first.    THERAPY PROGNOSIS: Good    PROBLEM LIST:   (Skilled intervention is medically necessary to address:)  Decreased ADL/Functional Activities  Decreased Activity Tolerance  Decreased Coordination  Decreased Safety Awareness  Decreased Strength  Decreased Transfer Abilities  Increased Pain INTERVENTIONS PLANNED:   (Benefits and precautions of physical therapy 
  Vision [x]     Hearing [x]     Cognition  [x]       MOBILITY: I Mod I S SBA CGA Min Mod Max Total  NT x2 Comments:   Bed Mobility    Rolling [] [] [x] [] [] [] [] [] [] [] []    Supine to Sit [] [] [] [] [] [] [] [] [] [] []    Scooting [] [] [x] [] [] [] [] [] [] [] []    Sit to Supine [] [] [x] [] [] [] [] [] [] [] []    Transfers    Sit to Stand [] [] [] [x] [x] [] [] [] [] [] [] In the steady   Bed to Chair [] [] [] [] [] [] [] [] [x] [] [] In the steady   Stand to Sit [] [] [] [x] [] [] [] [] [] [] [] In the steady     [] [] [] [] [] [] [] [] [] [] []    I=Independent, Mod I=Modified Independent, S=Supervision, SBA=Standby Assistance, CGA=Contact Guard Assistance,   Min=Minimal Assistance, Mod=Moderate Assistance, Max=Maximal Assistance, Total=Total Assistance, NT=Not Tested    GAIT: I Mod I S SBA CGA Min Mod Max Total  NT x2 Comments:   Level of Assistance [] [] [] [] [] [] [] [] [] [x] []    Distance       DME N/A    Gait Quality N/A    Weightbearing Status      Stairs      I=Independent, Mod I=Modified Independent, S=Supervision, SBA=Standby Assistance, CGA=Contact Guard Assistance,   Min=Minimal Assistance, Mod=Moderate Assistance, Max=Maximal Assistance, Total=Total Assistance, NT=Not Tested    PLAN:   FREQUENCY AND DURATION: Daily for duration of hospital stay or until stated goals are met, whichever comes first.    THERAPY PROGNOSIS: Good    PROBLEM LIST:   (Skilled intervention is medically necessary to address:)  Decreased ADL/Functional Activities  Decreased Activity Tolerance  Decreased Coordination  Decreased Safety Awareness  Decreased Strength  Decreased Transfer Abilities  Increased Pain INTERVENTIONS PLANNED:   (Benefits and precautions of physical therapy have been discussed with the patient.)  Therapeutic Activity  Therapeutic Exercise/HEP  Education       TREATMENT:       TREATMENT:   Therapeutic Activity (53 Minutes): Therapeutic activity included Supine to Sit, Sit to Supine, Scooting, 
1/15/2025 1006  Last data filed at 1/15/2025 0624  Gross per 24 hour   Intake --   Output 800 ml   Net -800 ml         Physical Exam:     General:    Well nourished.  No acute distress  Head:  Normocephalic, atraumatic. Large right sided facial hemangioma  Eyes:  Sclerae appear normal.  Pupils equally round.  ENT:  Nares appear normal.  Moist oral mucosa  Neck:  No restricted ROM.  Trachea midline   CV:   RRR.  No m/r/g.  No jugular venous distension.  Lungs:   CTAB.  No wheezing, rhonchi, or rales.  Symmetric expansion.  Abdomen:   Soft, obese, nontender, nondistended.  Extremities: No cyanosis or clubbing.  No edema  Skin:     No rashes.  Normal coloration.   Warm and dry.    Neuro:  CN II-XII grossly intact.    Psych:  Normal mood and affect.      I have personally reviewed labs and tests:  Recent Labs:  Recent Results (from the past 48 hour(s))   POCT Glucose    Collection Time: 01/13/25 11:27 AM   Result Value Ref Range    POC Glucose 158 (H) 65 - 100 mg/dL    Performed by: Veronica    Culture, Wound (with Gram Stain)    Collection Time: 01/13/25 12:56 PM    Specimen: Knee   Result Value Ref Range    Special Requests LEFT  KNEE  1        Gram Stain OCCASIONAL WBCS SEEN PER LPF      Gram Stain NO DEFINITE ORGANISM SEEN      Culture LIGHT Staphylococcus aureus (A)      Culture CULTURE IN PROGRESS,FURTHER UPDATES TO FOLLOW     Culture, Wound (with Gram Stain)    Collection Time: 01/13/25 12:57 PM    Specimen: Knee   Result Value Ref Range    Special Requests LEFT  KNEE  2        Gram Stain OCCASIONAL WBCS SEEN PER LPF      Gram Stain NO DEFINITE ORGANISM SEEN      Culture SCANT Staphylococcus aureus (A)      Culture CULTURE IN PROGRESS,FURTHER UPDATES TO FOLLOW     Culture, Wound (with Gram Stain)    Collection Time: 01/13/25 12:58 PM    Specimen: Knee   Result Value Ref Range    Special Requests LEFT  KNEE  3        Gram Stain OCCASIONAL WBCS SEEN PER LPF      Gram Stain NO DEFINITE ORGANISM SEEN      
Bearing As Tolerated              GROSS EVALUATION:  Intact Impaired (Comments):   AROM []  UE decreased but functional, LE's decreased    PROM []    Strength []  UE decreased but functional, LE's decreased - functional for supported standing   Balance [] Sitting - Static: Good  Sitting - Dynamic: Good  Standing - Static: Fair (in steady)  Standing - Dynamic: Fair (in steady)   Posture [] N/A   Sensation [x]  grossly   Coordination []   UE decreased but functional, LE's decreased    Tone []     Edema []    Activity Tolerance []  Generally decreased - improving daily    []      COGNITION/  PERCEPTION: Intact Impaired (Comments):   Orientation [x]     Vision [x]     Hearing [x]     Cognition  [x]       MOBILITY: I Mod I S SBA CGA Min Mod Max Total  NT x2 Comments:   Bed Mobility    Rolling [] [] [x] [] [] [] [] [] [] [] []    Supine to Sit [] [] [x] [] [] [] [] [] [] [] []    Scooting [] [] [x] [] [] [] [] [] [] [] []    Sit to Supine [] [] [x] [] [] [] [] [] [] [] []    Transfers    Sit to Stand [] [] [] [x] [] [] [] [] [] [] [] In the steady    Bed to Chair [] [] [] [] [] [] [] [] [x] [] [] With steady   Stand to Sit [] [] [] [x] [] [] [] [] [] [] []     [] [] [] [] [] [] [] [] [] [] []    I=Independent, Mod I=Modified Independent, S=Supervision, SBA=Standby Assistance, CGA=Contact Guard Assistance,   Min=Minimal Assistance, Mod=Moderate Assistance, Max=Maximal Assistance, Total=Total Assistance, NT=Not Tested    GAIT: I Mod I S SBA CGA Min Mod Max Total  NT x2 Comments:   Level of Assistance [] [] [] [] [] [] [] [] [] [x] []    Distance       DME N/A    Gait Quality N/A    Weightbearing Status Restrictions/Precautions  Restrictions/Precautions: Weight Bearing  Lower Extremity Weight Bearing Restrictions  Left Lower Extremity Weight Bearing: Weight Bearing As Tolerated    Stairs      I=Independent, Mod I=Modified Independent, S=Supervision, SBA=Standby Assistance, CGA=Contact Guard Assistance,   Min=Minimal 
[id number]    POCT Glucose    Collection Time: 01/19/25  7:12 AM   Result Value Ref Range    POC Glucose 112 (H) 65 - 100 mg/dL    Performed by: Valdemar    POCT Glucose    Collection Time: 01/19/25 10:44 AM   Result Value Ref Range    POC Glucose 121 (H) 65 - 100 mg/dL    Performed by: Fortino        No results for input(s): \"COVID19\" in the last 72 hours.    Current Meds:  Current Facility-Administered Medications   Medication Dose Route Frequency    ceFAZolin (ANCEF) 2,000 mg in sterile water 20 mL IV syringe  2,000 mg IntraVENous Q8H    apixaban (ELIQUIS) tablet 5 mg  5 mg Oral BID    DULoxetine (CYMBALTA) extended release capsule 60 mg  60 mg Oral BID    gabapentin (NEURONTIN) capsule 600 mg  600 mg Oral BID    hydrOXYzine HCl (ATARAX) tablet 25 mg  25 mg Oral Q8H PRN    metFORMIN (GLUCOPHAGE) tablet 500 mg  500 mg Oral BID    metoprolol tartrate (LOPRESSOR) tablet 50 mg  50 mg Oral BID    alogliptin (NESINA) tablet 12.5 mg  12.5 mg Oral Daily    sodium chloride flush 0.9 % injection 5-40 mL  5-40 mL IntraVENous 2 times per day    sodium chloride flush 0.9 % injection 5-40 mL  5-40 mL IntraVENous PRN    0.9 % sodium chloride infusion   IntraVENous PRN    acetaminophen (TYLENOL) tablet 1,000 mg  1,000 mg Oral Q6H    oxyCODONE (ROXICODONE) immediate release tablet 5 mg  5 mg Oral Q4H PRN    Or    oxyCODONE (ROXICODONE) immediate release tablet 10 mg  10 mg Oral Q4H PRN    HYDROmorphone HCl PF (DILAUDID) injection 1 mg  1 mg IntraVENous Q3H PRN    ondansetron (ZOFRAN-ODT) disintegrating tablet 4 mg  4 mg Oral Q8H PRN    Or    ondansetron (ZOFRAN) injection 4 mg  4 mg IntraVENous Q6H PRN    sennosides-docusate sodium (SENOKOT-S) 8.6-50 MG tablet 1 tablet  1 tablet Oral BID    diphenhydrAMINE (BENADRYL) capsule 25 mg  25 mg Oral Q6H PRN    Or    diphenhydrAMINE (BENADRYL) injection 25 mg  25 mg IntraVENous Q6H PRN    meloxicam (MOBIC) tablet 7.5 mg  7.5 mg Oral BID    naloxone (NARCAN) 
Mod=Moderate Assistance, Max=Maximal Assistance, Total=Total Assistance, NT=Not Tested    GAIT: I Mod I S SBA CGA Min Mod Max Total  NT x2 Comments:   Level of Assistance [] [] [] [] [] [] [] [] [] [x] []    Distance       DME N/A    Gait Quality N/A    Weightbearing Status      Stairs  N/A    I=Independent, Mod I=Modified Independent, S=Supervision, SBA=Standby Assistance, CGA=Contact Guard Assistance,   Min=Minimal Assistance, Mod=Moderate Assistance, Max=Maximal Assistance, Total=Total Assistance, NT=Not Tested    PLAN:   FREQUENCY AND DURATION: Daily for duration of hospital stay or until stated goals are met, whichever comes first.    THERAPY PROGNOSIS: Good    PROBLEM LIST:   (Skilled intervention is medically necessary to address:)  Decreased ADL/Functional Activities  Decreased Activity Tolerance  Decreased Coordination  Decreased Safety Awareness  Decreased Strength  Decreased Transfer Abilities  Increased Pain INTERVENTIONS PLANNED:   (Benefits and precautions of physical therapy have been discussed with the patient.)  Therapeutic Activity  Therapeutic Exercise/HEP  Education       TREATMENT:   EVALUATION: LOW COMPLEXITY: (Untimed Charge)  The initial evaluation charge encompasses clinical chart review, objective assessment, interpretation of assessment, and skilled monitoring of the patient's response to treatment in order to develop a plan of care.     TREATMENT:   Therapeutic Activity (42 Minutes): Therapeutic activity included Supine to Sit, Sit to Supine, Scooting, Sitting balance , and Standing balance to improve functional Activity tolerance, Balance, Coordination, Mobility, Strength, and ROM.    TREATMENT GRID:   Date:  1/14 Date:  1/15   Date:  1/16     Activity/Exercise Parameters Parameters Parameters   Ankle pumps B 12 12 12   Quad sets 12 12 12   Heel slides 12 12 12   Hip abd/ad 12 aa L 12 aa l 12   SAQ 12  aa  L 12 aa l 12 aa L                   N/A    AFTER TREATMENT PRECAUTIONS: Bed, 
assistance required.     AFTER TREATMENT PRECAUTIONS: Bed, Bed/Chair Locked, Call light within reach, Needs within reach, RN notified, and Visitors at bedside    INTERDISCIPLINARY COLLABORATION:  RN/ PCT, PT/ PTA, and OT/ DANIELS    Interdisciplinary Patient Education  (Reference education tab)    [] Safe And Effective Hygiene  [x] Fall Precautions  [x] Precautions  [] D/C Instruction Review [] Self Care Training and Home Safety  [x] Walker Management/Safety  [x] Adaptive Equipment as Needed  [x] Therapeutic Resting Position of Joint     TOTAL TREATMENT DURATION AND TIME:  Time In: 1515  Time Out: 1540  Minutes: 25    Martin Ventura, OT             
mL  125 mL IntraVENous PRN    Or    dextrose bolus 10% 250 mL  250 mL IntraVENous PRN    Glucagon Emergency KIT 1 mg  1 mg SubCUTAneous PRN    dextrose 10 % infusion   IntraVENous Continuous PRN       Signed:  CARSON Leroy - CNP    Part of this note may have been written by using a voice dictation software.  The note has been proof read but may still contain some grammatical/other typographical errors.

## 2025-01-22 NOTE — DISCHARGE SUMMARY
Total Joint Discharge Summary      Patient ID:  Maricel Gallego  [id number]  59 y.o.  1965    Admit date: 1/13/2025  Discharge date: No discharge date for patient encounter.  Admitting Surgeon: Bharat Lindo MD  Admission Diagnoses: Bacterial infection of knee joint [M00.869]  Infection of left knee [M00.9]  Discharge Diagnoses: Principal Problem:    Bacterial infection of knee joint  Active Problems:    Essential hypertension    Mixed hyperlipidemia    Type 2 diabetes mellitus with diabetic polyneuropathy, without long-term current use of insulin (HCC)    Infection of left knee  Resolved Problems:    * No resolved hospital problems. *    Procedure: Procedure(s) (LRB):  Left LEG DEBRIDEMENT INCISION AND DRAINAGE (Left)    Brief History: Was admitted 1/13/2025 for Procedure(s) (LRB):  Left LEG DEBRIDEMENT INCISION AND DRAINAGE (Left)    Hospital Course: Patient underwent surgery 1/13/2025 and under went Procedure(s) (LRB):  Left LEG DEBRIDEMENT INCISION AND DRAINAGE (Left). Patient did well postop. Pain was well controlled postop. Physical therapy and occupational therapy were initiated the day of surgery. The patient was placed on dual both chemical and mechanical DVT prophylaxis after surgery. The patient progressed well with therapy postop and was deemed safe and appropriate for discharge after surgery. Patient was discharged No discharge date for patient encounter..    Complications in Hospital: None                             Perioperative Antibiotics: Antibiotics per administered per protocol based on weight and prior drug allergies. If the patient was undergoing revision surgery or had a positive MRSA nasal swab the patient also received vancomycin.      Hospital Medications given:   Current Facility-Administered Medications   Medication Dose Route Frequency    ceFAZolin (ANCEF) 2,000 mg in sterile water 20 mL IV syringe  2,000 mg IntraVENous Q8H    apixaban (ELIQUIS) tablet 5 mg  5 mg Oral BID 
EXAM:

  XR Chest, 2 Views

 

CLINICAL HISTORY:

  Cough, sepsis

 

TECHNIQUE:

  Frontal and lateral views of the chest.

 

COMPARISON:

  No relevant prior studies available.

 

FINDINGS:

  Lungs:  Subsegmental opacities are noted in the perihilar regions and 

right lower lobe on the lateral projection but not confirmed in the 

frontal projection.

  Pleural space:  Unremarkable.  No pneumothorax.

  Heart:  Cardiac silhouette is within normal limits.

  Mediastinum:  Mediastinal contours are unremarkable.  The trachea is 

midline.

  Bones/joints:  Unremarkable.

 

IMPRESSION:     

  Subsegmental opacities are noted in the perihilar regions and right 

lower lobe on the lateral projection but not confirmed in the frontal 

projection.  Primary consideration is transient subsegmental atelectasis. 

 No lobar consolidation identified.  No large pleural effusion or 

pneumothorax.
substance prescription history, as maintained in the [place] prescription monitoring program, so that the prescription(s) for a  controlled substance can be given.    Signed:  CARSON MORENO CNP  1/22/2025  8:24 AM

## 2025-01-22 NOTE — PLAN OF CARE
Problem: Pain  Goal: Verbalizes/displays adequate comfort level or baseline comfort level  1/21/2025 2210 by Katina García, RN  Outcome: Progressing  1/21/2025 1100 by Mily Cleary, RN  Outcome: Progressing     Problem: Safety - Adult  Goal: Free from fall injury  Outcome: Progressing     Problem: Chronic Conditions and Co-morbidities  Goal: Patient's chronic conditions and co-morbidity symptoms are monitored and maintained or improved  Outcome: Progressing     Problem: Discharge Planning  Goal: Discharge to home or other facility with appropriate resources  Outcome: Progressing     Problem: ABCDS Injury Assessment  Goal: Absence of physical injury  Outcome: Progressing

## 2025-01-22 NOTE — DISCHARGE INSTRUCTIONS
[facility]      Patient Discharge Instructions    Maricel Gallego/[id number] : 1965    Admitted 2025 Discharged: 2025       IF YOU HAVE ANY PROBLEMS ONCE YOU ARE AT HOME CALL THE FOLLOWING NUMBERS:   Main office number: [phone]      Medications    The medications you are to continue on are listed on the medication reconciliation sheet.   Narcotic pain medications as well as supplemental iron can cause constipation. If this occurs try stopping the narcotic pain medication and/or the iron.   It is important that you take the medication exactly as they are prescribed.  Medications which increase your risk of blood clots are listed to stop for 5 weeks after surgery as well as medications or supplements which increase your risk of bleeding complications.   Keep your medication in the bottles provided by the pharmacist and keep a list of the medication names, dosages, and times to be taken in your wallet.   Do not take other medications without consulting your doctor.       Important Information    Do NOT smoke as this will greatly increase your risk of infection!    Resume your prehospital diet. If you have excessive nausea or vomitting call your doctor's office     Leg swelling and warmth is normal for 6 months after surgery. If you experience swelling in your leg elevate you leg while laying down with your toes above your heart. If you have sudden onset severe swelling with leg pain call our office. Use Nikolai Hose stockings until we see you in the office for your follow up appointment.    The stitches deep inside take approximately 6 months to dissolve. There will be sharp shooting, stinging and burning pain. This is normal and will resolve between 3-6 months after surgery.     Difficulty sleeping is normal following total Knee and Hip replacement. You may try melatonin, an over-the-counter sleep aid or benadryl to help with sleep. Most patients will resume sleeping through the night

## 2025-01-22 NOTE — CARE COORDINATION
Pt received Ins authorization/approval to go to Rehab. Sw talked with pt and attempted to encourage her to work as hard as she can with PT/OT in order to be able to walk and go home with [facility] after she finishes her IV antibiotics. Sw arranged for her w/c to be picked up by her sister in law.   Packet completed and ambulance arranged for transport. Rn called report.  Samara Fitzgerald/MATTHEW

## 2025-01-23 ENCOUNTER — CARE COORDINATION (OUTPATIENT)
Dept: CARE COORDINATION | Facility: CLINIC | Age: 60
End: 2025-01-23

## 2025-01-23 NOTE — CARE COORDINATION
Transition of care outreach postponed for 7 days due to patient's discharge to [facility].

## 2025-01-27 ENCOUNTER — TELEPHONE (OUTPATIENT)
Dept: ORTHOPEDIC SURGERY | Age: 60
End: 2025-01-27

## 2025-01-27 NOTE — TELEPHONE ENCOUNTER
I spoke to the patient and informed her that staples may come out today. She will relay the information, and the rehab facility may call us with any questions.

## 2025-01-27 NOTE — TELEPHONE ENCOUNTER
I called and left the patient a voicemail letting her know I got her scheduled to come in tomorrow w/ [facility] at 10:15AM at [address] To remove her kirk.

## 2025-01-27 NOTE — TELEPHONE ENCOUNTER
[facility] called and needed verbal orders to remove her staples so I have given her the verbal orders and told her to apply steristrips after the staples are removed.

## 2025-01-27 NOTE — TELEPHONE ENCOUNTER
She says today is post op day 14 and she still has her staples. She does not have a follow up appt. Can you let her know when these will come out.

## 2025-01-27 NOTE — TELEPHONE ENCOUNTER
She says [facility] is waiting to hear from you guys about her getting her stitches out. Please call.

## 2025-01-30 ENCOUNTER — CARE COORDINATION (OUTPATIENT)
Dept: CARE COORDINATION | Facility: CLINIC | Age: 60
End: 2025-01-30

## 2025-01-30 NOTE — CARE COORDINATION
Transition of care outreach postponed for 7 days due to patient's discharge to and continued stay at [facility].  Spoke directly with patient.  IV antibiotic infusions continues, last does scheduled 02/09/2025, has follow up with ID on 02/10/2025 and is hopeful for discharge home at that time.  Has scheduled follow up with orthopedics 02/04/2025 and with PCP on 02/13/2025 all of which she states she will be attending.  Reports rehab is going well and that she was able to ambulate over 150 feet with use of her walker yesterday.  Reports good appetite and hydration.  Denies falls, states pain is well controlled, and that left leg incision site is healing well.  Will continue to follow and document as per SENA guidelines and recommendations.

## 2025-02-04 ENCOUNTER — OFFICE VISIT (OUTPATIENT)
Dept: ORTHOPEDIC SURGERY | Age: 60
End: 2025-02-04

## 2025-02-04 DIAGNOSIS — G89.29 CHRONIC PAIN OF LEFT KNEE: Primary | ICD-10-CM

## 2025-02-04 DIAGNOSIS — M25.562 CHRONIC PAIN OF LEFT KNEE: Primary | ICD-10-CM

## 2025-02-04 PROCEDURE — 99024 POSTOP FOLLOW-UP VISIT: CPT | Performed by: ORTHOPAEDIC SURGERY

## 2025-02-04 NOTE — PROGRESS NOTES
Patient ID:  Maricel Gallego  [id number]  59 y.o.  1965    Today: February 4, 2025    CHIEF COMPLAINT:  Follow-up I&D left knee    HISTORY:  The patient presents today for 3-week follow-up after I&D left native knee.  Cultures grew MSSA and patient was placed on IV Ancef per infectious disease.   She is transferred here today from [facility] on a stretcher where she is doing PT and OT daily.   Patient reports she tolerates using a walker for short distances.  She continues Eliquis for DVT prophylaxis.    PE:  Incision is examined which is well healed.  No erythema, induration or drainage. No significant fluid accumulation around the surgical site.  ROM is 0 to 50 degrees.  Distally able to plantar and dorsiflex foot and ankle.  Sensation intact.  Limb is perfused.  No sign of DVT.    X-RAYS:    None    ASSESSMENT:  3 Weeks S/P I&D Left Knee    PLAN:  Continue activity and weight bearing as tolerated.  Continue PT/OT to focus on strengthening and stretching.  Continue to take pain medication appropriately.  She is scheduled to follow up with ID on 2/10.  Physical therapy referral will be sent to this office so patient can transition to outpatient therapy when she's discharged from [facility].  I will plan to check the patient back in 3 months for a routine 4 month follow-up.      Signed By: CARSON MORENO - EFREN  February 4, 2025

## 2025-02-06 ENCOUNTER — CARE COORDINATION (OUTPATIENT)
Dept: CARE COORDINATION | Facility: CLINIC | Age: 60
End: 2025-02-06

## 2025-02-06 NOTE — CARE COORDINATION
Transition of care outreach postponed for 7 days due to patient's discharge to and continued stay at [facility].  Was able to speak directly with the patient.  Anticipated discharge for 02/11/2025 if follow up appointment with ID no longer requiring IV antibiotic therapy.  Will continue to follow and document as per SENA workflow guidelines and recommendations.

## 2025-02-10 DIAGNOSIS — I10 ESSENTIAL HYPERTENSION: ICD-10-CM

## 2025-02-10 RX ORDER — METOPROLOL TARTRATE 50 MG
50 TABLET ORAL 2 TIMES DAILY
Qty: 180 TABLET | Refills: 0 | OUTPATIENT
Start: 2025-02-10

## 2025-02-11 ENCOUNTER — TELEPHONE (OUTPATIENT)
Dept: INTERNAL MEDICINE CLINIC | Facility: CLINIC | Age: 60
End: 2025-02-11

## 2025-02-11 PROBLEM — E78.5 HYPERLIPIDEMIA, UNSPECIFIED: Status: ACTIVE | Noted: 2021-02-02

## 2025-02-11 PROBLEM — I10 ESSENTIAL (PRIMARY) HYPERTENSION: Status: ACTIVE | Noted: 2021-02-02

## 2025-02-11 PROBLEM — I82.4Y1 ACUTE EMBOLISM AND THROMBOSIS OF UNSPECIFIED DEEP VEINS OF RIGHT PROXIMAL LOWER EXTREMITY (HCC): Status: ACTIVE | Noted: 2024-10-16

## 2025-02-11 PROBLEM — M00.062 STAPHYLOCOCCAL ARTHRITIS OF LEFT KNEE (HCC): Status: ACTIVE | Noted: 2025-01-25

## 2025-02-11 PROBLEM — R53.81 DEBILITY: Status: ACTIVE | Noted: 2025-01-25

## 2025-02-11 PROBLEM — Z86.718 HISTORY OF DEEP VEIN THROMBOSIS (DVT) OF LOWER EXTREMITY: Status: ACTIVE | Noted: 2024-10-07

## 2025-02-11 PROBLEM — I80.201: Status: ACTIVE | Noted: 2024-10-16

## 2025-02-11 PROBLEM — D50.9 MICROCYTIC ANEMIA: Status: ACTIVE | Noted: 2025-01-25

## 2025-02-11 RX ORDER — OXYCODONE HYDROCHLORIDE 5 MG/1
TABLET ORAL EVERY 4 HOURS PRN
COMMUNITY
Start: 2025-01-22

## 2025-02-11 NOTE — TELEPHONE ENCOUNTER
Care Transitions Initial Follow Up Call    Outreach made within 2 business days of discharge: Yes    Patient: Maricel Gallego Patient : 1965   MRN: [id number]  Reason for Admission: Bacterial infection of knee joint  Discharge Date: 25       Spoke with: Patient    Discharge department/facility: [facility]    TCM Interactive Patient Contact:  Was patient able to fill all prescriptions: Yes  Was patient instructed to bring all medications to the follow-up visit: Yes  Is patient taking all medications as directed in the discharge summary? Yes  Does patient understand their discharge instructions: Yes  Does patient have questions or concerns that need addressed prior to 7-14 day follow up office visit: no    Additional needs identified to be addressed with provider  No needs identified             Scheduled appointment with PCP within 7-14 days    Follow Up  Future Appointments   Date Time Provider Department Center   2025  9:00 AM Jo Ann Mccauley PA-C [facility] DEP   2025  1:00 PM Adan Garcia MD [facility] AMB   2025 11:30 AM Jo Ann Mccauley PA-C [facility] DEP   2025  2:00 PM Bharat Lindo MD [facility] AMB   2025  9:00 AM Miguel Domingo DO [facility]       Leni Macias LPN

## 2025-02-11 NOTE — TELEPHONE ENCOUNTER
Manuel from [facility] called to inform the office that the pt was admitted at the hospital for Pyogenic Arthritis . Requesting verbal orders for Home Health services at home.    Please advise...

## 2025-02-12 ENCOUNTER — CARE COORDINATION (OUTPATIENT)
Dept: CARE COORDINATION | Facility: CLINIC | Age: 60
End: 2025-02-12

## 2025-02-12 DIAGNOSIS — M00.869 BACTERIAL INFECTION OF KNEE JOINT: Primary | ICD-10-CM

## 2025-02-12 LAB
FUNGAL CULT/SMEAR: NORMAL
FUNGUS (MYCOLOGY) CULTURE: NEGATIVE
FUNGUS SMEAR: NORMAL
REFLEX TO ID: NORMAL
SPECIMEN PROCESSING: NORMAL
SPECIMEN SOURCE: NORMAL

## 2025-02-12 NOTE — CARE COORDINATION
Care Transitions Note    Initial Call - Call within 2 business days of discharge: Yes    Patient Current Location:  Home: [address] [place] [address]    Care Transition Nurse contacted the patient by telephone to perform post hospital discharge assessment, verified name and  as identifiers. Provided introduction to self, and explanation of the Care Transition Nurse role.     Patient: Maricel Gallego    Patient : 1965   MRN: [id number]    Reason for Admission: Bacterial infection of knee joint   Discharge Date: 25  RURS: Readmission Risk Score: 6.3      Last Discharge Facility       Date Complaint Diagnosis Description Type Department Provider    25   Admission (Discharged) Bharat Wang MD            Was this an external facility discharge? Yes. Discharge Date: 2025. Facility Name: [facility]    Additional needs identified to be addressed with provider   No needs identified             Method of communication with provider: none.    Patients top risk factors for readmission: medical condition-I&D left native knee-recent infection.Cultures grew MSSA and patient was placed on IV Ancef per infectious disease.       Interventions to address risk factors:   Review of patient management of conditions/medications: verbalized understanding.  Home Health: [facility] pending SOC on this day, 2025     Care Summary Note: Patient states they are doing ok. Motivated to progress with therapies. No concerns or questions at this time.    Care Transition Nurse reviewed discharge instructions with patient. The patient was given an opportunity to ask questions; all questions answered at this time.. The patient verbalized understanding.   Were discharge instructions available to patient? Yes.   Reviewed appropriate site of care based on symptoms and resources available to patient including: PCP  Specialist  Urgent care clinics  [facility]  When to call 911. The patient

## 2025-02-13 ENCOUNTER — OFFICE VISIT (OUTPATIENT)
Dept: INTERNAL MEDICINE CLINIC | Facility: CLINIC | Age: 60
End: 2025-02-13

## 2025-02-13 DIAGNOSIS — D50.9 MICROCYTIC HYPOCHROMIC ANEMIA: ICD-10-CM

## 2025-02-13 DIAGNOSIS — M00.062 STAPHYLOCOCCAL ARTHRITIS OF LEFT KNEE: ICD-10-CM

## 2025-02-13 DIAGNOSIS — Z09 HOSPITAL DISCHARGE FOLLOW-UP: Primary | ICD-10-CM

## 2025-02-13 DIAGNOSIS — R00.0 TACHYCARDIA: ICD-10-CM

## 2025-02-13 DIAGNOSIS — Z79.2 LONG TERM (CURRENT) USE OF ANTIBIOTICS: ICD-10-CM

## 2025-02-13 DIAGNOSIS — D50.9 IRON DEFICIENCY ANEMIA, UNSPECIFIED IRON DEFICIENCY ANEMIA TYPE: ICD-10-CM

## 2025-02-13 DIAGNOSIS — F43.23 SITUATIONAL MIXED ANXIETY AND DEPRESSIVE DISORDER: ICD-10-CM

## 2025-02-13 LAB
ALBUMIN SERPL-MCNC: 3 G/DL (ref 3.2–4.6)
ALBUMIN/GLOB SERPL: 0.6 (ref 1–1.9)
ALP SERPL-CCNC: 124 U/L (ref 35–104)
ALT SERPL-CCNC: 12 U/L (ref 8–45)
ANION GAP SERPL CALC-SCNC: 11 MMOL/L (ref 7–16)
AST SERPL-CCNC: 39 U/L (ref 15–37)
BASOPHILS # BLD: 0.04 K/UL (ref 0–0.2)
BASOPHILS NFR BLD: 0.5 % (ref 0–2)
BILIRUB SERPL-MCNC: 0.5 MG/DL (ref 0–1.2)
BUN SERPL-MCNC: 10 MG/DL (ref 8–23)
CALCIUM SERPL-MCNC: 9.7 MG/DL (ref 8.8–10.2)
CHLORIDE SERPL-SCNC: 102 MMOL/L (ref 98–107)
CO2 SERPL-SCNC: 26 MMOL/L (ref 20–29)
CREAT SERPL-MCNC: 0.63 MG/DL (ref 0.6–1.1)
DIFFERENTIAL METHOD BLD: ABNORMAL
EOSINOPHIL # BLD: 0.13 K/UL (ref 0–0.8)
EOSINOPHIL NFR BLD: 1.7 % (ref 0.5–7.8)
ERYTHROCYTE [DISTWIDTH] IN BLOOD BY AUTOMATED COUNT: 18.9 % (ref 11.9–14.6)
FERRITIN SERPL-MCNC: 128 NG/ML (ref 8–388)
GLOBULIN SER CALC-MCNC: 5 G/DL (ref 2.3–3.5)
GLUCOSE SERPL-MCNC: 126 MG/DL (ref 70–99)
HCT VFR BLD AUTO: 37.8 % (ref 35.8–46.3)
HGB BLD-MCNC: 11.2 G/DL (ref 11.7–15.4)
IMM GRANULOCYTES # BLD AUTO: 0.03 K/UL (ref 0–0.5)
IMM GRANULOCYTES NFR BLD AUTO: 0.4 % (ref 0–5)
IRON SATN MFR SERPL: 11 % (ref 20–50)
IRON SERPL-MCNC: 26 UG/DL (ref 35–100)
LYMPHOCYTES # BLD: 2.02 K/UL (ref 0.5–4.6)
LYMPHOCYTES NFR BLD: 27.2 % (ref 13–44)
MAGNESIUM SERPL-MCNC: 1.9 MG/DL (ref 1.8–2.4)
MCH RBC QN AUTO: 22.8 PG (ref 26.1–32.9)
MCHC RBC AUTO-ENTMCNC: 29.6 G/DL (ref 31.4–35)
MCV RBC AUTO: 77 FL (ref 82–102)
MONOCYTES # BLD: 0.52 K/UL (ref 0.1–1.3)
MONOCYTES NFR BLD: 7 % (ref 4–12)
NEUTS SEG # BLD: 4.69 K/UL (ref 1.7–8.2)
NEUTS SEG NFR BLD: 63.2 % (ref 43–78)
NRBC # BLD: 0 K/UL (ref 0–0.2)
PLATELET # BLD AUTO: 328 K/UL (ref 150–450)
PMV BLD AUTO: 10.1 FL (ref 9.4–12.3)
POTASSIUM SERPL-SCNC: 3.9 MMOL/L (ref 3.5–5.1)
PROT SERPL-MCNC: 8 G/DL (ref 6.3–8.2)
RBC # BLD AUTO: 4.91 M/UL (ref 4.05–5.2)
SODIUM SERPL-SCNC: 139 MMOL/L (ref 136–145)
TIBC SERPL-MCNC: 242 UG/DL (ref 240–450)
UIBC SERPL-MCNC: 216 UG/DL (ref 112–347)
WBC # BLD AUTO: 7.4 K/UL (ref 4.3–11.1)

## 2025-02-13 RX ORDER — CEFADROXIL 500 MG/1
1000 CAPSULE ORAL 2 TIMES DAILY
COMMUNITY
Start: 2025-02-11

## 2025-02-13 RX ORDER — IRON ASPGLY,PS/C/B12/FA/CA/SUC 150-25-1
1 CAPSULE ORAL DAILY
COMMUNITY
Start: 2025-02-10 | End: 2025-02-13

## 2025-02-13 RX ORDER — FERROUS SULFATE 325(65) MG
1 TABLET ORAL DAILY
COMMUNITY
Start: 2025-02-10 | End: 2025-02-13 | Stop reason: SDUPTHER

## 2025-02-13 RX ORDER — IRON,FM,PS/FOLIC/B,C18/L.CASEI 130-1.25MG
1 CAPSULE ORAL DAILY
Qty: 30 CAPSULE | Refills: 5 | Status: SHIPPED | OUTPATIENT
Start: 2025-02-13

## 2025-02-13 ASSESSMENT — ENCOUNTER SYMPTOMS
DIARRHEA: 1
SHORTNESS OF BREATH: 0

## 2025-02-13 NOTE — RESULT ENCOUNTER NOTE
Hemoglobin levels are improving post surgery - currently 11.2 up from 9.2 last month so good progress already but iron levels are low despite her taking ferrous sulfate which is why I want her on the Fusion Plus as we discussed - it should absorb better.

## 2025-02-14 VITALS
HEART RATE: 120 BPM | OXYGEN SATURATION: 96 % | SYSTOLIC BLOOD PRESSURE: 138 MMHG | WEIGHT: 268 LBS | BODY MASS INDEX: 42.06 KG/M2 | TEMPERATURE: 96.6 F | HEIGHT: 67 IN | DIASTOLIC BLOOD PRESSURE: 86 MMHG

## 2025-02-14 DIAGNOSIS — E11.42 TYPE 2 DIABETES MELLITUS WITH DIABETIC POLYNEUROPATHY, WITHOUT LONG-TERM CURRENT USE OF INSULIN (HCC): ICD-10-CM

## 2025-02-14 DIAGNOSIS — D50.9 IRON DEFICIENCY ANEMIA, UNSPECIFIED IRON DEFICIENCY ANEMIA TYPE: Primary | ICD-10-CM

## 2025-02-14 DIAGNOSIS — E78.2 MIXED HYPERLIPIDEMIA: ICD-10-CM

## 2025-02-14 DIAGNOSIS — I10 ESSENTIAL HYPERTENSION: ICD-10-CM

## 2025-02-14 DIAGNOSIS — R74.8 ELEVATED LIVER ENZYMES: ICD-10-CM

## 2025-02-14 NOTE — PATIENT INSTRUCTIONS
Asked to go home and take Metoprolol immediately  Recommend restriction of caffeine to help reduce heart rate  Encouraged to monitor for any signs of Cymbalta side effects including but not limited to dry mouth, constipation, constipation, nausea, dizziness, etc  Will plan on doing fasting labs through home health the first week of March  Discussed the importance of elevating lower legs when sitting/lying down - ask physical therapist for best location for her to sit and what legs should be propped up on  Reminded of the importance of staying well hydrated with plenty of water  Consider a daily probiotic while on extended course of antibiotics unless taking Fusion Plus which already includes a probiotic - shared easy access through [employer]  Discussed the benefits of getting on Fusion Plus for optimal absorption potential and quickest recovery

## 2025-02-19 ENCOUNTER — CARE COORDINATION (OUTPATIENT)
Dept: CARE COORDINATION | Facility: CLINIC | Age: 60
End: 2025-02-19

## 2025-02-19 NOTE — CARE COORDINATION
5/13/2025 2:00 PM (Arrive by 1:45 PM) Bharat Lindo MD Orthopedic Surgery [phone]    12/16/2025 9:00 AM (Arrive by 8:45 AM) Miguel Domingo,  Rheumatology [phone]            N Care Coordinator provided contact information.  Plan for follow-up call in 6-10 days based on severity of symptoms and risk factors.  Plan for next call: self management-diet, hydration, exercise, follow up appointments.       Jennifer Villa LPN

## 2025-02-26 ENCOUNTER — CARE COORDINATION (OUTPATIENT)
Dept: CARE COORDINATION | Facility: CLINIC | Age: 60
End: 2025-02-26

## 2025-02-26 NOTE — CARE COORDINATION
Care Transitions Note    Final Call     Patient Current Location:  Home: [address] [place], [place] [id number]    LPN Care Coordinator contacted the patient by telephone. Verified name and  as identifiers.    Patient graduated from the Care Transitions program on 2025.  Patient/family verbalizes confidence in the ability to self-manage at this time. has the ability to self manage at this time..      Advance Care Planning:   Does patient have an Advance Directive: reviewed during previous call, see note. .    Handoff:   Patient was not referred to the ACM team due to no additional needs identified.       Care Summary Note: Continues to do very well.  Reports she is feeling very good, increased energy and endurance.  Continues with HH therapy sessions.  Reports good appetite and hydration.  No needs at this time, expresses gratitude for follow up calls.  Has contact information if needed, Jennifer Villa LPN  [phone].    Assessments:  Please see above care summary note.     Upcoming Appointments:    Future Appointments         Provider Specialty Dept Phone    3/10/2025 2:25 PM (Arrive by 2:10 PM) Mily White, PT Physical Therapy [phone]    3/27/2025 11:00 AM Jo Ann Mccauley PA-C Internal Medicine [phone]    2025 11:30 AM Jo Ann Mccauley PA-C Internal Medicine [phone]    2025 2:00 PM (Arrive by 1:45 PM) Bharat Lindo MD Orthopedic Surgery [phone]    2025 9:00 AM (Arrive by 8:45 AM) Miguel Domingo, DO Rheumatology [phone]            Patient has agreed to contact primary care provider and/or specialist for any further questions, concerns, or needs.    Jennifer Villa LPN

## 2025-03-10 ENCOUNTER — EVALUATION (OUTPATIENT)
Age: 60
End: 2025-03-10
Payer: COMMERCIAL

## 2025-03-10 DIAGNOSIS — M25.562 CHRONIC PAIN OF LEFT KNEE: ICD-10-CM

## 2025-03-10 DIAGNOSIS — M25.561 ACUTE BILATERAL KNEE PAIN: ICD-10-CM

## 2025-03-10 DIAGNOSIS — M25.662 KNEE STIFFNESS, LEFT: Primary | ICD-10-CM

## 2025-03-10 DIAGNOSIS — M25.562 ACUTE BILATERAL KNEE PAIN: ICD-10-CM

## 2025-03-10 DIAGNOSIS — Z74.09 IMPAIRED FUNCTIONAL MOBILITY, BALANCE, GAIT, AND ENDURANCE: ICD-10-CM

## 2025-03-10 DIAGNOSIS — M25.462 EFFUSION OF LEFT KNEE: ICD-10-CM

## 2025-03-10 DIAGNOSIS — M25.661 STIFFNESS OF RIGHT KNEE: ICD-10-CM

## 2025-03-10 DIAGNOSIS — G89.29 CHRONIC PAIN OF LEFT KNEE: ICD-10-CM

## 2025-03-10 DIAGNOSIS — R26.2 DIFFICULTY WALKING: ICD-10-CM

## 2025-03-10 DIAGNOSIS — M62.81 MUSCLE WEAKNESS: ICD-10-CM

## 2025-03-10 PROCEDURE — 97162 PT EVAL MOD COMPLEX 30 MIN: CPT | Performed by: PHYSICAL THERAPIST

## 2025-03-10 NOTE — PROGRESS NOTES
[facility] - [facility]  [address] [place] [id number]  Dept: [phone]      Physical Therapy Initial Assessment     Referring MD: Eric Basilio APRN *  Diagnosis:     ICD-10-CM    1. Knee stiffness, left  M25.662       2. Stiffness of right knee  M25.661       3. Effusion of left knee  M25.462       4. Muscle weakness  M62.81       5. Difficulty walking  R26.2       6. Impaired functional mobility, balance, gait, and endurance  Z74.09       7. Acute bilateral knee pain  M25.561     M25.562          Surgery: Left LEG DEBRIDEMENT INCISION AND DRAINAGE - Left Date: 1/13/2025  Therapy precautions:Fall risk and Gait belt for dynamic standing activity, eliquis for several more months  Co-morbidities affecting plan of care: ORIF tibia ~1998 that limited knee flexion, medial meniscus tear R knee, B DVT 2025, diabetes with neuoropathy    Payor: Payor: [place] [employer] /  /  /  Billing pattern: Commercial- substantial/midpoint time each CPT  Total Timed Codes: 0 min, Total Treatment Time: 40 min Modifier needed: No    Episode visit count:  1     PERTINENT MEDICAL HISTORY     Past medical and surgical history:   Past Medical History:   Diagnosis Date    Atherosclerosis of arteries 03/18/2024    Scattered Throughout Abdominal Vasculature per CT Abdomen/Pelvis    Bacterial infection of knee joint (HCC)     Contact dermatitis 7/21/2021    COVID-19 12/2019    Diabetes (HCC)     Type 2, managed with oral medication. Does not take surgar daily. No problems with hypoglycemia. Patient reports average BS low 100's    Diabetic polyneuropathy (HCC)     DVT (deep venous thrombosis) (HCC)     Hx-right leg, on Eliquis, Followed by PCP    Elevated C-reactive protein     GERD (gastroesophageal reflux disease)     managed with medication    Chetek's disease     Hypercholesterolemia     Hypertension     managed with medication    Microcytic hypochromic anemia     Overactive bladder     Panic attacks     PRN

## 2025-03-13 ENCOUNTER — TREATMENT (OUTPATIENT)
Age: 60
End: 2025-03-13

## 2025-03-13 DIAGNOSIS — M25.661 STIFFNESS OF RIGHT KNEE: ICD-10-CM

## 2025-03-13 DIAGNOSIS — M25.561 ACUTE BILATERAL KNEE PAIN: ICD-10-CM

## 2025-03-13 DIAGNOSIS — M62.81 MUSCLE WEAKNESS: ICD-10-CM

## 2025-03-13 DIAGNOSIS — Z74.09 IMPAIRED FUNCTIONAL MOBILITY, BALANCE, GAIT, AND ENDURANCE: ICD-10-CM

## 2025-03-13 DIAGNOSIS — R26.2 DIFFICULTY WALKING: ICD-10-CM

## 2025-03-13 DIAGNOSIS — M25.662 KNEE STIFFNESS, LEFT: Primary | ICD-10-CM

## 2025-03-13 DIAGNOSIS — M25.562 ACUTE BILATERAL KNEE PAIN: ICD-10-CM

## 2025-03-13 DIAGNOSIS — M25.462 EFFUSION OF LEFT KNEE: ICD-10-CM

## 2025-03-13 NOTE — PROGRESS NOTES
[facility] - [facility]  [address] [place] [id number]  Dept: [phone]      Physical Therapy Daily Note     Referring MD: Eric Basilio APRN *  Diagnosis:     ICD-10-CM    1. Knee stiffness, left  M25.662       2. Stiffness of right knee  M25.661       3. Effusion of left knee  M25.462       4. Muscle weakness  M62.81       5. Difficulty walking  R26.2       6. Impaired functional mobility, balance, gait, and endurance  Z74.09       7. Acute bilateral knee pain  M25.561     M25.562          Surgery: Left LEG DEBRIDEMENT INCISION AND DRAINAGE - Left Date: 1/13/2025  Therapy precautions:Fall risk and Gait belt for dynamic standing activity, eliquis for several more months  Co-morbidities affecting plan of care: ORIF tibia ~1998 that limited knee flexion, medial meniscus tear R knee, B DVT 2025, diabetes with neuropathy  Chief complaints/history of injury: Pt noted R knee discomfort with every step while walking her dog ~3/25/23 and then felt a loud pop in the knee while going up the steps to the house. Pt diagnosed with medial meniscus tear by Dr. JOLEEN Lindo and she completed 4 sessions of PT at this location 5/17/23-5/30/23, stopping due to extended out of town travel. Pt followed up with Dr. JOLEEN Lindo 9/12/23 and elected to hold off on injection. She was referred ankle/foot for R ankle injury where she received a R ankle lace up brace to address arthritis and calcaneonavicular coalition. Pt caught R toe on steps 9/30/24 and was seen in ED due to increased R knee pain. Pt was discharged home in knee immobilizer with referral to outpatient ortho ([facility]) where she had joint aspiration and steroid injection to the R knee. Pt returned to the ED 10/7/24 with worsening R knee pain and pain B lower legs. She was admitted and treated for B lower extremity DVTs and then discharged to short term rehab 10/16/24. Pt states that she had a poor experience in rehab with minimal

## 2025-03-16 NOTE — PROGRESS NOTES
activity and was discharged home after ~ 2 months. Pt reports minimal out of bed and essentially stayed in bed all the time due to pain/weakness. On 12/13/24- pt seen for OP MRI of lumbar spine and was referred to the ED with L knee swelling. Pt discharged home with ortho follow up. Pt saw Dr. SEA Lindo 12/17/24 who was unable to aspirate the knee so sent for IR guided US/CT knee aspiration completed 1/3/25. Pt diagnosed with septic arthritis L knee and underwent I&D 1/13/25. Pt transferred to short term rehab through 2/10/25 with PICC line in place and discharged home once IV medication completed. Pt now on 90 days of oral antibiotics. Pt had home health PT ending 3/5/25. Pt is WBAT LLE.   Describe current symptoms: Significant stiffness B knees after sitting for even a short period of time making transfers/walking difficult. Pt reports LE and overall weakness as primarily in bed for the past 6 months. Knee pain present in certain positions.   Patient Stated Goals: walking without a limp and for longer distances, gain confidence and balance    Payor: Pay[place] [employer] /  /  /  Billing pattern: Commercial- substantial/midpoint time each CPT   Total Timed Procedure Codes: 55 min, Total Time: 55 min Modifier needed: No  Episode visit count:  3     SUBJECTIVE     Pt says feels her overall mobility is improving. Primary complaint of ongoing L knee swelling, tightness and anterior knee pain with motion.       Medications: no changes since last session    OBJECTIVE       ROM Measures:    R 3/10 L 3/10 Comment   Knee Extension Lacks 10° Lacks 25°    Knee Flexion 70° 80° L knee limited since ORIF   Hip WFL WFL    Ankle WFL WFL Restricted DF           Strength/MMT (0-5 Scale):   R 3/10 L 3/10 Comment   Hip Flexion 3+ 3+    Hip Extension   NT   Hip Abduction 3+ 4    Knee Extension 5 3-    Knee Flexion      Quad set fair fair    Ankle DF 5 5    Ankle PF 5 5              Treatment provided today:  Therapeutic exercise ([id number]) x

## 2025-03-17 ENCOUNTER — TREATMENT (OUTPATIENT)
Age: 60
End: 2025-03-17
Payer: COMMERCIAL

## 2025-03-17 DIAGNOSIS — M25.561 ACUTE BILATERAL KNEE PAIN: ICD-10-CM

## 2025-03-17 DIAGNOSIS — M25.661 STIFFNESS OF RIGHT KNEE: ICD-10-CM

## 2025-03-17 DIAGNOSIS — M62.81 MUSCLE WEAKNESS: ICD-10-CM

## 2025-03-17 DIAGNOSIS — M25.462 EFFUSION OF LEFT KNEE: ICD-10-CM

## 2025-03-17 DIAGNOSIS — R26.2 DIFFICULTY WALKING: ICD-10-CM

## 2025-03-17 DIAGNOSIS — M25.562 ACUTE BILATERAL KNEE PAIN: ICD-10-CM

## 2025-03-17 DIAGNOSIS — M25.662 KNEE STIFFNESS, LEFT: Primary | ICD-10-CM

## 2025-03-17 DIAGNOSIS — Z74.09 IMPAIRED FUNCTIONAL MOBILITY, BALANCE, GAIT, AND ENDURANCE: ICD-10-CM

## 2025-03-17 PROCEDURE — 97110 THERAPEUTIC EXERCISES: CPT | Performed by: PHYSICAL THERAPY ASSISTANT

## 2025-03-17 PROCEDURE — 97140 MANUAL THERAPY 1/> REGIONS: CPT | Performed by: PHYSICAL THERAPY ASSISTANT

## 2025-03-19 NOTE — PROGRESS NOTES
[facility] - [facility]  [address] [place] [id number]  Dept: [phone]      Physical Therapy Daily Note     Referring MD: Eric Basilio APRN *  Diagnosis:     ICD-10-CM    1. Knee stiffness, left  M25.662       2. Stiffness of right knee  M25.661       3. Effusion of left knee  M25.462       4. Muscle weakness  M62.81       5. Difficulty walking  R26.2       6. Impaired functional mobility, balance, gait, and endurance  Z74.09       7. Acute bilateral knee pain  M25.561     M25.562              Surgery: Left LEG DEBRIDEMENT INCISION AND DRAINAGE - Left Date: 1/13/2025  Therapy precautions:Fall risk and Gait belt for dynamic standing activity, eliquis for several more months  Co-morbidities affecting plan of care: ORIF tibia ~1998 that limited knee flexion, medial meniscus tear R knee, B DVT 2025, diabetes with neuropathy  Chief complaints/history of injury: Pt noted R knee discomfort with every step while walking her dog ~3/25/23 and then felt a loud pop in the knee while going up the steps to the house. Pt diagnosed with medial meniscus tear by Dr. JOLEEN Lindo and she completed 4 sessions of PT at this location 5/17/23-5/30/23, stopping due to extended out of town travel. Pt followed up with Dr. JOLEEN Lindo 9/12/23 and elected to hold off on injection. She was referred ankle/foot for R ankle injury where she received a R ankle lace up brace to address arthritis and calcaneonavicular coalition. Pt caught R toe on steps 9/30/24 and was seen in ED due to increased R knee pain. Pt was discharged home in knee immobilizer with referral to outpatient ortho ([facility]) where she had joint aspiration and steroid injection to the R knee. Pt returned to the ED 10/7/24 with worsening R knee pain and pain B lower legs. She was admitted and treated for B lower extremity DVTs and then discharged to short term rehab 10/16/24. Pt states that she had a poor experience in rehab with minimal

## 2025-03-20 ENCOUNTER — TREATMENT (OUTPATIENT)
Age: 60
End: 2025-03-20

## 2025-03-20 DIAGNOSIS — M25.561 ACUTE BILATERAL KNEE PAIN: ICD-10-CM

## 2025-03-20 DIAGNOSIS — M25.662 KNEE STIFFNESS, LEFT: Primary | ICD-10-CM

## 2025-03-20 DIAGNOSIS — Z74.09 IMPAIRED FUNCTIONAL MOBILITY, BALANCE, GAIT, AND ENDURANCE: ICD-10-CM

## 2025-03-20 DIAGNOSIS — M25.462 EFFUSION OF LEFT KNEE: ICD-10-CM

## 2025-03-20 DIAGNOSIS — M62.81 MUSCLE WEAKNESS: ICD-10-CM

## 2025-03-20 DIAGNOSIS — R26.2 DIFFICULTY WALKING: ICD-10-CM

## 2025-03-20 DIAGNOSIS — M25.661 STIFFNESS OF RIGHT KNEE: ICD-10-CM

## 2025-03-20 DIAGNOSIS — M25.562 ACUTE BILATERAL KNEE PAIN: ICD-10-CM

## 2025-03-25 ENCOUNTER — TREATMENT (OUTPATIENT)
Age: 60
End: 2025-03-25
Payer: COMMERCIAL

## 2025-03-25 DIAGNOSIS — M25.662 KNEE STIFFNESS, LEFT: Primary | ICD-10-CM

## 2025-03-25 DIAGNOSIS — M62.81 MUSCLE WEAKNESS: ICD-10-CM

## 2025-03-25 DIAGNOSIS — Z74.09 IMPAIRED FUNCTIONAL MOBILITY, BALANCE, GAIT, AND ENDURANCE: ICD-10-CM

## 2025-03-25 DIAGNOSIS — M25.661 STIFFNESS OF RIGHT KNEE: ICD-10-CM

## 2025-03-25 DIAGNOSIS — M25.462 EFFUSION OF LEFT KNEE: ICD-10-CM

## 2025-03-25 DIAGNOSIS — R26.2 DIFFICULTY WALKING: ICD-10-CM

## 2025-03-25 PROCEDURE — 97116 GAIT TRAINING THERAPY: CPT | Performed by: PHYSICAL THERAPY ASSISTANT

## 2025-03-25 PROCEDURE — 97110 THERAPEUTIC EXERCISES: CPT | Performed by: PHYSICAL THERAPY ASSISTANT

## 2025-03-25 NOTE — PROGRESS NOTES
[facility]  [address] [place] [id number]  Dept: [phone]      Physical Therapy Daily Note     Referring MD: Eric Basilio APRN *  Diagnosis:     ICD-10-CM    1. Knee stiffness, left  M25.662       2. Stiffness of right knee  M25.661       3. Effusion of left knee  M25.462       4. Muscle weakness  M62.81       5. Impaired functional mobility, balance, gait, and endurance  Z74.09       6. Difficulty walking  R26.2             Surgery: Left LEG DEBRIDEMENT INCISION AND DRAINAGE - Left Date: 1/13/2025  Therapy precautions:Fall risk and Gait belt for dynamic standing activity, eliquis for several more months  Co-morbidities affecting plan of care: ORIF tibia ~1998 that limited knee flexion, medial meniscus tear R knee, B DVT 2025, diabetes with neuropathy  Chief complaints/history of injury: Pt noted R knee discomfort with every step while walking her dog ~3/25/23 and then felt a loud pop in the knee while going up the steps to the house. Pt diagnosed with medial meniscus tear by Dr. JOLEEN Lindo and she completed 4 sessions of PT at this location 5/17/23-5/30/23, stopping due to extended out of town travel. Pt followed up with Dr. JOLEEN Lindo 9/12/23 and elected to hold off on injection. She was referred ankle/foot for R ankle injury where she received a R ankle lace up brace to address arthritis and calcaneonavicular coalition. Pt caught R toe on steps 9/30/24 and was seen in ED due to increased R knee pain. Pt was discharged home in knee immobilizer with referral to outpatient ortho ([facility]) where she had joint aspiration and steroid injection to the R knee. Pt returned to the ED 10/7/24 with worsening R knee pain and pain B lower legs. She was admitted and treated for B lower extremity DVTs and then discharged to short term rehab 10/16/24. Pt states that she had a poor experience in rehab with minimal activity and was discharged home after ~ 2 months. Pt

## 2025-03-26 PROBLEM — E11.9 TYPE 2 DIABETES MELLITUS WITHOUT COMPLICATIONS: Status: ACTIVE | Noted: 2021-02-02

## 2025-03-26 PROBLEM — E78.2 MIXED HYPERLIPIDEMIA: Status: ACTIVE | Noted: 2021-02-02

## 2025-03-26 PROBLEM — M17.12 ARTHRITIS OF LEFT KNEE: Status: ACTIVE | Noted: 2023-04-24

## 2025-03-26 PROBLEM — M00.9 SUPPURATIVE ARTHRITIS (HCC): Status: ACTIVE | Noted: 2025-01-22

## 2025-03-26 ASSESSMENT — ENCOUNTER SYMPTOMS: SHORTNESS OF BREATH: 0

## 2025-03-26 NOTE — PROGRESS NOTES
compression stockings). Negative for chest pain and palpitations.   Gastrointestinal:  Negative for blood in stool.        Negative for melena   Endocrine: Negative for polydipsia.   Genitourinary:  Positive for enuresis (nocturnal) and frequency. Negative for dysuria, hematuria and urgency.   Musculoskeletal:  Negative for myalgias.   Neurological:  Negative for dizziness, numbness and headaches.          /80 (BP Site: Left Upper Arm, Patient Position: Sitting, BP Cuff Size: Large Adult)   Pulse 73   Temp 97.3 °F (36.3 °C) (Temporal)   Ht 1.702 m (5' 7\")   Wt 121.6 kg (268 lb)   LMP  (LMP Unknown)   SpO2 98%   BMI 41.97 kg/m²         Objective   Physical Exam  Constitutional:       Appearance: Normal appearance. She is obese.   HENT:      Head: Normocephalic and atraumatic.   Eyes:      Conjunctiva/sclera: Conjunctivae normal.      Pupils: Pupils are equal, round, and reactive to light.   Neck:      Vascular: No carotid bruit.   Cardiovascular:      Rate and Rhythm: Normal rate and regular rhythm.      Heart sounds: Normal heart sounds.   Pulmonary:      Effort: Pulmonary effort is normal.      Breath sounds: Normal breath sounds.   Musculoskeletal:         General: Normal range of motion.      Cervical back: Normal range of motion.      Right lower leg: Edema (1+ pitting) present.      Left lower leg: Edema (1+ pitting) present.   Skin:     General: Skin is warm and dry.   Neurological:      Mental Status: She is alert and oriented to person, place, and time.   Psychiatric:         Mood and Affect: Mood normal.         Behavior: Behavior normal.         Thought Content: Thought content normal.         Judgment: Judgment normal.               1/7/2025     8:26 AM 4/6/2024    11:16 AM 12/14/2023     1:42 PM 9/6/2023     1:26 PM 5/26/2023     9:18 AM 4/24/2023     3:27 PM 1/12/2023     8:56 AM   PHQ-9 Questionaire   Little interest or pleasure in doing things 1 0 0 1 0 0 0   Feeling down, depressed, or

## 2025-03-27 ENCOUNTER — OFFICE VISIT (OUTPATIENT)
Dept: INTERNAL MEDICINE CLINIC | Facility: CLINIC | Age: 60
End: 2025-03-27
Payer: COMMERCIAL

## 2025-03-27 ENCOUNTER — RESULTS FOLLOW-UP (OUTPATIENT)
Dept: INTERNAL MEDICINE CLINIC | Facility: CLINIC | Age: 60
End: 2025-03-27

## 2025-03-27 ENCOUNTER — TREATMENT (OUTPATIENT)
Age: 60
End: 2025-03-27
Payer: COMMERCIAL

## 2025-03-27 VITALS
HEART RATE: 73 BPM | HEIGHT: 67 IN | OXYGEN SATURATION: 98 % | TEMPERATURE: 97.3 F | SYSTOLIC BLOOD PRESSURE: 128 MMHG | WEIGHT: 268 LBS | BODY MASS INDEX: 42.06 KG/M2 | DIASTOLIC BLOOD PRESSURE: 80 MMHG

## 2025-03-27 DIAGNOSIS — R26.2 DIFFICULTY WALKING: ICD-10-CM

## 2025-03-27 DIAGNOSIS — M62.81 MUSCLE WEAKNESS: ICD-10-CM

## 2025-03-27 DIAGNOSIS — R74.01 ELEVATED AST (SGOT): ICD-10-CM

## 2025-03-27 DIAGNOSIS — M25.662 KNEE STIFFNESS, LEFT: Primary | ICD-10-CM

## 2025-03-27 DIAGNOSIS — N39.44 NOCTURNAL ENURESIS: ICD-10-CM

## 2025-03-27 DIAGNOSIS — R35.0 URINARY FREQUENCY: ICD-10-CM

## 2025-03-27 DIAGNOSIS — I10 ESSENTIAL HYPERTENSION: ICD-10-CM

## 2025-03-27 DIAGNOSIS — E11.42 TYPE 2 DIABETES MELLITUS WITH DIABETIC POLYNEUROPATHY, WITHOUT LONG-TERM CURRENT USE OF INSULIN (HCC): Primary | ICD-10-CM

## 2025-03-27 DIAGNOSIS — E78.2 MIXED HYPERLIPIDEMIA: ICD-10-CM

## 2025-03-27 DIAGNOSIS — D50.9 IRON DEFICIENCY ANEMIA, UNSPECIFIED IRON DEFICIENCY ANEMIA TYPE: ICD-10-CM

## 2025-03-27 DIAGNOSIS — M25.661 STIFFNESS OF RIGHT KNEE: ICD-10-CM

## 2025-03-27 DIAGNOSIS — Z74.09 IMPAIRED FUNCTIONAL MOBILITY, BALANCE, GAIT, AND ENDURANCE: ICD-10-CM

## 2025-03-27 DIAGNOSIS — E11.42 TYPE 2 DIABETES MELLITUS WITH DIABETIC POLYNEUROPATHY, WITHOUT LONG-TERM CURRENT USE OF INSULIN (HCC): ICD-10-CM

## 2025-03-27 DIAGNOSIS — M25.462 EFFUSION OF LEFT KNEE: ICD-10-CM

## 2025-03-27 DIAGNOSIS — I82.491 ACUTE DEEP VEIN THROMBOSIS (DVT) OF OTHER SPECIFIED VEIN OF RIGHT LOWER EXTREMITY: ICD-10-CM

## 2025-03-27 LAB
ALBUMIN SERPL-MCNC: 3.4 G/DL (ref 3.2–4.6)
ALBUMIN/GLOB SERPL: 0.7 (ref 1–1.9)
ALP SERPL-CCNC: 109 U/L (ref 35–104)
ALT SERPL-CCNC: 20 U/L (ref 8–45)
ANION GAP SERPL CALC-SCNC: 9 MMOL/L (ref 7–16)
AST SERPL-CCNC: 29 U/L (ref 15–37)
BASOPHILS # BLD: 0.05 K/UL (ref 0–0.2)
BASOPHILS NFR BLD: 0.7 % (ref 0–2)
BILIRUB SERPL-MCNC: 0.4 MG/DL (ref 0–1.2)
BUN SERPL-MCNC: 15 MG/DL (ref 8–23)
CALCIUM SERPL-MCNC: 9.9 MG/DL (ref 8.8–10.2)
CHLORIDE SERPL-SCNC: 102 MMOL/L (ref 98–107)
CO2 SERPL-SCNC: 28 MMOL/L (ref 20–29)
CREAT SERPL-MCNC: 0.76 MG/DL (ref 0.6–1.1)
DIFFERENTIAL METHOD BLD: ABNORMAL
EOSINOPHIL # BLD: 0.27 K/UL (ref 0–0.8)
EOSINOPHIL NFR BLD: 4 % (ref 0.5–7.8)
ERYTHROCYTE [DISTWIDTH] IN BLOOD BY AUTOMATED COUNT: 17.3 % (ref 11.9–14.6)
FERRITIN SERPL-MCNC: 74 NG/ML (ref 8–388)
GLOBULIN SER CALC-MCNC: 4.6 G/DL (ref 2.3–3.5)
GLUCOSE SERPL-MCNC: 91 MG/DL (ref 70–99)
HBA1C MFR BLD: 5.7 %
HCT VFR BLD AUTO: 40.3 % (ref 35.8–46.3)
HGB BLD-MCNC: 12.4 G/DL (ref 11.7–15.4)
IMM GRANULOCYTES # BLD AUTO: 0.01 K/UL (ref 0–0.5)
IMM GRANULOCYTES NFR BLD AUTO: 0.1 % (ref 0–5)
IRON SATN MFR SERPL: 23 % (ref 20–50)
IRON SERPL-MCNC: 66 UG/DL (ref 35–100)
LYMPHOCYTES # BLD: 2.79 K/UL (ref 0.5–4.6)
LYMPHOCYTES NFR BLD: 41.5 % (ref 13–44)
MCH RBC QN AUTO: 23.6 PG (ref 26.1–32.9)
MCHC RBC AUTO-ENTMCNC: 30.8 G/DL (ref 31.4–35)
MCV RBC AUTO: 76.8 FL (ref 82–102)
MONOCYTES # BLD: 0.49 K/UL (ref 0.1–1.3)
MONOCYTES NFR BLD: 7.3 % (ref 4–12)
NEUTS SEG # BLD: 3.12 K/UL (ref 1.7–8.2)
NEUTS SEG NFR BLD: 46.4 % (ref 43–78)
NRBC # BLD: 0 K/UL (ref 0–0.2)
PLATELET # BLD AUTO: 274 K/UL (ref 150–450)
PMV BLD AUTO: 11 FL (ref 9.4–12.3)
POTASSIUM SERPL-SCNC: 4.5 MMOL/L (ref 3.5–5.1)
PROT SERPL-MCNC: 8 G/DL (ref 6.3–8.2)
RBC # BLD AUTO: 5.25 M/UL (ref 4.05–5.2)
SODIUM SERPL-SCNC: 139 MMOL/L (ref 136–145)
TIBC SERPL-MCNC: 290 UG/DL (ref 240–450)
UIBC SERPL-MCNC: 224 UG/DL (ref 112–347)
WBC # BLD AUTO: 6.7 K/UL (ref 4.3–11.1)

## 2025-03-27 PROCEDURE — 3074F SYST BP LT 130 MM HG: CPT | Performed by: PHYSICIAN ASSISTANT

## 2025-03-27 PROCEDURE — 3044F HG A1C LEVEL LT 7.0%: CPT | Performed by: PHYSICIAN ASSISTANT

## 2025-03-27 PROCEDURE — 3079F DIAST BP 80-89 MM HG: CPT | Performed by: PHYSICIAN ASSISTANT

## 2025-03-27 PROCEDURE — 99215 OFFICE O/P EST HI 40 MIN: CPT | Performed by: PHYSICIAN ASSISTANT

## 2025-03-27 PROCEDURE — 83036 HEMOGLOBIN GLYCOSYLATED A1C: CPT | Performed by: PHYSICIAN ASSISTANT

## 2025-03-27 PROCEDURE — 97110 THERAPEUTIC EXERCISES: CPT | Performed by: PHYSICAL THERAPY ASSISTANT

## 2025-03-27 PROCEDURE — 97530 THERAPEUTIC ACTIVITIES: CPT | Performed by: PHYSICAL THERAPY ASSISTANT

## 2025-03-27 RX ORDER — METOPROLOL TARTRATE 50 MG
50 TABLET ORAL 2 TIMES DAILY
Qty: 180 TABLET | Refills: 3 | Status: SHIPPED | OUTPATIENT
Start: 2025-03-27

## 2025-03-27 RX ORDER — IRON,FM,PS/FOLIC/B,C18/L.CASEI 130-1.25MG
1 CAPSULE ORAL DAILY
Qty: 90 CAPSULE | Refills: 3 | Status: CANCELLED | OUTPATIENT
Start: 2025-03-27

## 2025-03-27 ASSESSMENT — ENCOUNTER SYMPTOMS: BLOOD IN STOOL: 0

## 2025-03-27 NOTE — PROGRESS NOTES
will demonstrate good recall of HEP requiring minimal verbal cuing for proper form and technique.  Increase B knee flexion AROM to 95 degrees, in order to sit and transfer sit to stand more easily..  Pt will demonstrate knee extension deficit </= 5° in order to stand more upright and for improved walking mechanics.  Pt will improve TUG time to </= 28 seconds to improve walking pace in order to cross the street efficiently.  Pt will complete at least 5 reps in 30 sec chair stand using BUEs for support.    Long term goals to be met by 6/8/2025 (90 days):  Pt will be compliant and independent with a comprehensive HEP and activity progression.   Demonstrate AROM of involved knee to be 3-0-100 degrees, allowing for ADLs with decreased restrictions related to knee stiffness.    Pt will increase LE strength to at least 4/5 with MMT for improved stability in gait.   Pt will improve TUG time to </= 28 seconds to improve walking pace in order to cross the street efficiently.  Pt will complete at least 8 reps in 30 sec chair stand using BUEs for support.    Zachary Prell

## 2025-03-28 NOTE — RESULT ENCOUNTER NOTE
Fasting blood sugar looks great!  Kidney function is normal.  Liver enzymes are normal.  Alkaline phosphatase is gradually improving as the knee heals.  Anemia has improved with hemoglobin back in normal range, iron markers are all improved quite nicely, size of RBC is still small (this is typical with iron deficiency) but this should improve eventually if we keep iron levels well replaced.

## 2025-03-28 NOTE — PATIENT INSTRUCTIONS
Praised weight loss success  Continue daily exercise within parameters that she is given by physical therapy  Reminded that diabetic eye exam is overdue and needs to be scheduled soon  Monitor blood pressure 1-2 times/week and keep record to bring to next appointment  Discussed that she can plan to discontinue Eliquis after she is released from ortho and is ambulating/living without an assistive device  Will revisit need for statin after next lipid panel is completed  Maintain diligent efforts toward regaining fully independent living  Cautioned about being so unrestricted with intake of desserts given value of maintaining weight loss

## 2025-03-31 ENCOUNTER — TELEPHONE (OUTPATIENT)
Dept: INTERNAL MEDICINE CLINIC | Facility: CLINIC | Age: 60
End: 2025-03-31

## 2025-03-31 ENCOUNTER — TREATMENT (OUTPATIENT)
Age: 60
End: 2025-03-31
Payer: COMMERCIAL

## 2025-03-31 DIAGNOSIS — M25.661 STIFFNESS OF RIGHT KNEE: ICD-10-CM

## 2025-03-31 DIAGNOSIS — R26.2 DIFFICULTY WALKING: ICD-10-CM

## 2025-03-31 DIAGNOSIS — M25.562 ACUTE BILATERAL KNEE PAIN: ICD-10-CM

## 2025-03-31 DIAGNOSIS — M25.462 EFFUSION OF LEFT KNEE: ICD-10-CM

## 2025-03-31 DIAGNOSIS — M25.561 ACUTE BILATERAL KNEE PAIN: ICD-10-CM

## 2025-03-31 DIAGNOSIS — M25.662 KNEE STIFFNESS, LEFT: Primary | ICD-10-CM

## 2025-03-31 DIAGNOSIS — M62.81 MUSCLE WEAKNESS: ICD-10-CM

## 2025-03-31 DIAGNOSIS — Z74.09 IMPAIRED FUNCTIONAL MOBILITY, BALANCE, GAIT, AND ENDURANCE: ICD-10-CM

## 2025-03-31 PROCEDURE — 97140 MANUAL THERAPY 1/> REGIONS: CPT | Performed by: PHYSICAL THERAPIST

## 2025-03-31 PROCEDURE — 97530 THERAPEUTIC ACTIVITIES: CPT | Performed by: PHYSICAL THERAPIST

## 2025-03-31 PROCEDURE — 97110 THERAPEUTIC EXERCISES: CPT | Performed by: PHYSICAL THERAPIST

## 2025-03-31 NOTE — PROGRESS NOTES
[facility] - [facility]  [address] [place] [id number]  Dept: [phone]      Physical Therapy Daily Note     Referring MD: Eric Basilio APRN *  Diagnosis:     ICD-10-CM    1. Knee stiffness, left  M25.662       2. Stiffness of right knee  M25.661       3. Effusion of left knee  M25.462       4. Muscle weakness  M62.81       5. Impaired functional mobility, balance, gait, and endurance  Z74.09       6. Difficulty walking  R26.2       7. Acute bilateral knee pain  M25.561     M25.562           Surgery: Left LEG DEBRIDEMENT INCISION AND DRAINAGE - Left Date: 1/13/2025  Therapy precautions:Fall risk and Gait belt for dynamic standing activity, eliquis for several more months  Co-morbidities affecting plan of care: ORIF tibia ~1998 that limited knee flexion, medial meniscus tear R knee, B DVT 2025, diabetes with neuropathy  Chief complaints/history of injury: Pt noted R knee discomfort with every step while walking her dog ~3/25/23 and then felt a loud pop in the knee while going up the steps to the house. Pt diagnosed with medial meniscus tear by Dr. JOLEEN Lindo and she completed 4 sessions of PT at this location 5/17/23-5/30/23, stopping due to extended out of town travel. Pt followed up with Dr. JOLEEN Lindo 9/12/23 and elected to hold off on injection. She was referred ankle/foot for R ankle injury where she received a R ankle lace up brace to address arthritis and calcaneonavicular coalition. Pt caught R toe on steps 9/30/24 and was seen in ED due to increased R knee pain. Pt was discharged home in knee immobilizer with referral to outpatient ortho (Claude Landaverde) where she had joint aspiration and steroid injection to the R knee. Pt returned to the ED 10/7/24 with worsening R knee pain and pain B lower legs. She was admitted and treated for B lower extremity DVTs and then discharged to short term rehab 10/16/24. Pt states that she had a poor experience in rehab with minimal

## 2025-04-02 ENCOUNTER — TREATMENT (OUTPATIENT)
Age: 60
End: 2025-04-02
Payer: COMMERCIAL

## 2025-04-02 ENCOUNTER — CLINICAL SUPPORT (OUTPATIENT)
Dept: INTERNAL MEDICINE CLINIC | Facility: CLINIC | Age: 60
End: 2025-04-02
Payer: COMMERCIAL

## 2025-04-02 DIAGNOSIS — E11.42 TYPE 2 DIABETES MELLITUS WITH DIABETIC POLYNEUROPATHY, WITHOUT LONG-TERM CURRENT USE OF INSULIN (HCC): Primary | ICD-10-CM

## 2025-04-02 DIAGNOSIS — D50.9 IRON DEFICIENCY ANEMIA, UNSPECIFIED IRON DEFICIENCY ANEMIA TYPE: Primary | ICD-10-CM

## 2025-04-02 DIAGNOSIS — M62.81 MUSCLE WEAKNESS: ICD-10-CM

## 2025-04-02 DIAGNOSIS — M25.462 EFFUSION OF LEFT KNEE: ICD-10-CM

## 2025-04-02 DIAGNOSIS — M25.561 ACUTE BILATERAL KNEE PAIN: ICD-10-CM

## 2025-04-02 DIAGNOSIS — M25.562 ACUTE BILATERAL KNEE PAIN: ICD-10-CM

## 2025-04-02 DIAGNOSIS — M25.662 KNEE STIFFNESS, LEFT: Primary | ICD-10-CM

## 2025-04-02 DIAGNOSIS — R26.2 DIFFICULTY WALKING: ICD-10-CM

## 2025-04-02 DIAGNOSIS — M25.661 STIFFNESS OF RIGHT KNEE: ICD-10-CM

## 2025-04-02 DIAGNOSIS — Z74.09 IMPAIRED FUNCTIONAL MOBILITY, BALANCE, GAIT, AND ENDURANCE: ICD-10-CM

## 2025-04-02 PROCEDURE — 3044F HG A1C LEVEL LT 7.0%: CPT | Performed by: PHYSICIAN ASSISTANT

## 2025-04-02 PROCEDURE — 99211 OFF/OP EST MAY X REQ PHY/QHP: CPT | Performed by: PHYSICIAN ASSISTANT

## 2025-04-02 PROCEDURE — 97110 THERAPEUTIC EXERCISES: CPT | Performed by: PHYSICAL THERAPIST

## 2025-04-02 PROCEDURE — 97530 THERAPEUTIC ACTIVITIES: CPT | Performed by: PHYSICAL THERAPIST

## 2025-04-02 PROCEDURE — 97140 MANUAL THERAPY 1/> REGIONS: CPT | Performed by: PHYSICAL THERAPIST

## 2025-04-02 RX ORDER — IRON,FM,PS/FOLIC/B,C18/L.CASEI 130-1.25MG
1 CAPSULE ORAL DAILY
Qty: 30 CAPSULE | Refills: 5 | Status: SHIPPED | OUTPATIENT
Start: 2025-04-02

## 2025-04-02 NOTE — TELEPHONE ENCOUNTER
Pt requesting that her Fusion Plus RX go to her local [facility] pharmacy due to [facility] no longer carrying this RX, please. She has 3 capsules left.

## 2025-04-02 NOTE — PROGRESS NOTES
Applied Dexcom G6 Pro sensor and transmitter on pt's L lower abd for blinded session. Checked status of transmitter via clinic's  and received notification that connection was successful. Pt educated on Dexcom G6 and education handouts given. Pt verbalized understanding and has no further questions at this time. Results will be reviewed with Jo Ann Mccauley PA-C at upcoming visit on 4/28/2025.

## 2025-04-02 NOTE — PROGRESS NOTES
[facility] - [facility]  [address] [place] [id number]  Dept: [phone]      Physical Therapy Daily Note     Referring MD: Eric Basilio APRN *  Diagnosis:     ICD-10-CM    1. Knee stiffness, left  M25.662       2. Stiffness of right knee  M25.661       3. Effusion of left knee  M25.462       4. Muscle weakness  M62.81       5. Impaired functional mobility, balance, gait, and endurance  Z74.09       6. Difficulty walking  R26.2       7. Acute bilateral knee pain  M25.561     M25.562           Surgery: Left LEG DEBRIDEMENT INCISION AND DRAINAGE - Left Date: 1/13/2025  Therapy precautions:Fall risk and Gait belt for dynamic standing activity, eliquis for several more months  Co-morbidities affecting plan of care: ORIF tibia ~1998 that limited knee flexion, medial meniscus tear R knee, B DVT 2025, diabetes with neuropathy  Chief complaints/history of injury: Pt noted R knee discomfort with every step while walking her dog ~3/25/23 and then felt a loud pop in the knee while going up the steps to the house. Pt diagnosed with medial meniscus tear by Dr. JOLEEN Lindo and she completed 4 sessions of PT at this location 5/17/23-5/30/23, stopping due to extended out of town travel. Pt followed up with Dr. JOLEEN Lindo 9/12/23 and elected to hold off on injection. She was referred ankle/foot for R ankle injury where she received a R ankle lace up brace to address arthritis and calcaneonavicular coalition. Pt caught R toe on steps 9/30/24 and was seen in ED due to increased R knee pain. Pt was discharged home in knee immobilizer with referral to outpatient ortho (Claude Landaverde) where she had joint aspiration and steroid injection to the R knee. Pt returned to the ED 10/7/24 with worsening R knee pain and pain B lower legs. She was admitted and treated for B lower extremity DVTs and then discharged to short term rehab 10/16/24. Pt states that she had a poor experience in rehab with minimal

## 2025-04-09 ENCOUNTER — TREATMENT (OUTPATIENT)
Age: 60
End: 2025-04-09
Payer: COMMERCIAL

## 2025-04-09 DIAGNOSIS — M25.462 EFFUSION OF LEFT KNEE: ICD-10-CM

## 2025-04-09 DIAGNOSIS — M25.561 ACUTE BILATERAL KNEE PAIN: ICD-10-CM

## 2025-04-09 DIAGNOSIS — M25.562 ACUTE BILATERAL KNEE PAIN: ICD-10-CM

## 2025-04-09 DIAGNOSIS — M62.81 MUSCLE WEAKNESS: ICD-10-CM

## 2025-04-09 DIAGNOSIS — Z74.09 IMPAIRED FUNCTIONAL MOBILITY, BALANCE, GAIT, AND ENDURANCE: ICD-10-CM

## 2025-04-09 DIAGNOSIS — M25.661 STIFFNESS OF RIGHT KNEE: ICD-10-CM

## 2025-04-09 DIAGNOSIS — M25.662 KNEE STIFFNESS, LEFT: Primary | ICD-10-CM

## 2025-04-09 DIAGNOSIS — R26.2 DIFFICULTY WALKING: ICD-10-CM

## 2025-04-09 PROCEDURE — 97016 VASOPNEUMATIC DEVICE THERAPY: CPT | Performed by: PHYSICAL THERAPIST

## 2025-04-09 PROCEDURE — 97110 THERAPEUTIC EXERCISES: CPT | Performed by: PHYSICAL THERAPIST

## 2025-04-09 PROCEDURE — 97140 MANUAL THERAPY 1/> REGIONS: CPT | Performed by: PHYSICAL THERAPIST

## 2025-04-09 NOTE — PROGRESS NOTES
well as helping to decrease pain/spasms and swelling      GOALS     Goals:  Short term goals to be met by 4/7/2025  (4 weeks):  Pt will demonstrate good recall of HEP requiring minimal verbal cuing for proper form and technique.  Increase B knee flexion AROM to 95 degrees, in order to sit and transfer sit to stand more easily..  Pt will demonstrate knee extension deficit </= 5° in order to stand more upright and for improved walking mechanics.  Pt will improve TUG time to </= 28 seconds to improve walking pace in order to cross the street efficiently.  Pt will complete at least 5 reps in 30 sec chair stand using BUEs for support.    Long term goals to be met by 6/8/2025 (90 days):  Pt will be compliant and independent with a comprehensive HEP and activity progression.   Demonstrate AROM of involved knee to be 3-0-100 degrees, allowing for ADLs with decreased restrictions related to knee stiffness.    Pt will increase LE strength to at least 4/5 with MMT for improved stability in gait.   Pt will improve TUG time to </= 18 seconds to improve walking pace in order to cross the street efficiently.  Pt will complete at least 8 reps in 30 sec chair stand using BUEs for support.    [employer]  Access Code: [email or web address]  URL: [email or web address]  Date: 04/09/2025  Prepared by: Mily White     Exercises  - Seated Long Arc Quad  - 5 x weekly - 2 sets - 10 reps - 5 hold  - Seated Knee Flexion Stretch  - 2 x daily - 1 sets - 4 reps - 30 hold  - Seated Hamstring Stretch  - 1 x daily - 1 sets - 4 reps - 30 hold  - Standing Heel Raise with Support  - 1 x daily - 3 sets - 10 reps  - Standing Hip Extension with Counter Support  - 1 x daily - 3 sets - 10 reps  - Sit to Stand with Counter Support  - 1 x daily - 2 sets - 5 reps

## 2025-04-10 NOTE — PROGRESS NOTES
[facility] - [facility]  [address] [place] [id number]  Dept: [phone]      Physical Therapy Progress Report     Referring MD: Eric Basilio APRN *  Diagnosis:     ICD-10-CM    1. Knee stiffness, left  M25.662       2. Stiffness of right knee  M25.661       3. Effusion of left knee  M25.462       4. Muscle weakness  M62.81       5. Impaired functional mobility, balance, gait, and endurance  Z74.09       6. Difficulty walking  R26.2       7. Acute bilateral knee pain  M25.561     M25.562           Surgery: Left LEG DEBRIDEMENT INCISION AND DRAINAGE - Left Date: 1/13/2025  Therapy precautions:Fall risk and Gait belt for dynamic standing activity, eliquis for several more months  Co-morbidities affecting plan of care: ORIF tibia ~1998 that limited knee flexion, medial meniscus tear R knee, B DVT 2025, diabetes with neuropathy  Chief complaints/history of injury: Pt noted R knee discomfort with every step while walking her dog ~3/25/23 and then felt a loud pop in the knee while going up the steps to the house. Pt diagnosed with medial meniscus tear by Dr. JOLEEN Lindo and she completed 4 sessions of PT at this location 5/17/23-5/30/23, stopping due to extended out of town travel. Pt followed up with Dr. JOLEEN Lindo 9/12/23 and elected to hold off on injection. She was referred ankle/foot for R ankle injury where she received a R ankle lace up brace to address arthritis and calcaneonavicular coalition. Pt caught R toe on steps 9/30/24 and was seen in ED due to increased R knee pain. Pt was discharged home in knee immobilizer with referral to outpatient ortho (Claude Landaverde) where she had joint aspiration and steroid injection to the R knee. Pt returned to the ED 10/7/24 with worsening R knee pain and pain B lower legs. She was admitted and treated for B lower extremity DVTs and then discharged to short term rehab 10/16/24. Pt states that she had a poor experience in rehab with minimal

## 2025-04-14 ENCOUNTER — TREATMENT (OUTPATIENT)
Age: 60
End: 2025-04-14
Payer: COMMERCIAL

## 2025-04-14 DIAGNOSIS — Z74.09 IMPAIRED FUNCTIONAL MOBILITY, BALANCE, GAIT, AND ENDURANCE: ICD-10-CM

## 2025-04-14 DIAGNOSIS — M25.662 KNEE STIFFNESS, LEFT: Primary | ICD-10-CM

## 2025-04-14 DIAGNOSIS — R26.2 DIFFICULTY WALKING: ICD-10-CM

## 2025-04-14 DIAGNOSIS — M25.561 ACUTE BILATERAL KNEE PAIN: ICD-10-CM

## 2025-04-14 DIAGNOSIS — M25.462 EFFUSION OF LEFT KNEE: ICD-10-CM

## 2025-04-14 DIAGNOSIS — M62.81 MUSCLE WEAKNESS: ICD-10-CM

## 2025-04-14 DIAGNOSIS — M25.661 STIFFNESS OF RIGHT KNEE: ICD-10-CM

## 2025-04-14 DIAGNOSIS — M25.562 ACUTE BILATERAL KNEE PAIN: ICD-10-CM

## 2025-04-14 PROCEDURE — 97110 THERAPEUTIC EXERCISES: CPT | Performed by: PHYSICAL THERAPIST

## 2025-04-14 PROCEDURE — 97016 VASOPNEUMATIC DEVICE THERAPY: CPT | Performed by: PHYSICAL THERAPIST

## 2025-04-14 PROCEDURE — 97140 MANUAL THERAPY 1/> REGIONS: CPT | Performed by: PHYSICAL THERAPIST

## 2025-04-16 ENCOUNTER — TREATMENT (OUTPATIENT)
Age: 60
End: 2025-04-16

## 2025-04-16 DIAGNOSIS — R26.2 DIFFICULTY WALKING: ICD-10-CM

## 2025-04-16 DIAGNOSIS — M62.81 MUSCLE WEAKNESS: ICD-10-CM

## 2025-04-16 DIAGNOSIS — M25.662 KNEE STIFFNESS, LEFT: Primary | ICD-10-CM

## 2025-04-16 DIAGNOSIS — M25.562 ACUTE BILATERAL KNEE PAIN: ICD-10-CM

## 2025-04-16 DIAGNOSIS — M25.561 ACUTE BILATERAL KNEE PAIN: ICD-10-CM

## 2025-04-16 DIAGNOSIS — Z74.09 IMPAIRED FUNCTIONAL MOBILITY, BALANCE, GAIT, AND ENDURANCE: ICD-10-CM

## 2025-04-16 DIAGNOSIS — M25.462 EFFUSION OF LEFT KNEE: ICD-10-CM

## 2025-04-16 DIAGNOSIS — M25.661 STIFFNESS OF RIGHT KNEE: ICD-10-CM

## 2025-04-16 NOTE — PROGRESS NOTES
activity and was discharged home after ~ 2 months. Pt reports minimal out of bed and essentially stayed in bed all the time due to pain/weakness. On 12/13/24- pt seen for OP MRI of lumbar spine and was referred to the ED with L knee swelling. Pt discharged home with ortho follow up. Pt saw Dr. SEA Lindo 12/17/24 who was unable to aspirate the knee so sent for IR guided US/CT knee aspiration completed 1/3/25. Pt diagnosed with septic arthritis L knee and underwent I&D 1/13/25. Pt transferred to short term rehab through 2/10/25 with PICC line in place and discharged home once IV medication completed. Pt now on 90 days of oral antibiotics. Pt had home health PT ending 3/5/25. Pt is WBAT LLE.  Describe current symptoms: Significant stiffness B knees after sitting for even a short period of time making transfers/walking difficult. Pt reports LE and overall weakness as primarily in bed for the past 6 months. Knee pain present in certain positions.   Patient Stated Goals: walking without a limp and for longer distances, gain confidence and balance    Payor: Payor: [place] [employer] /  /  /  Billing pattern: Commercial- substantial/midpoint time each CPT  Total Timed Procedure Codes: 40 min, Total Time: 45 min Modifier needed: No  Episode visit count:  11     SUBJECTIVE     Pt reports no significant changes.     Medications: no changes since last session    OBJECTIVE     STEADI assessment  Test  3/10/25 4/7/25   TUG 38 seconds w/ walker 25.6 seconds w/ walker   30s Chair Stand 3 repetitions w/ BUE use, elevated plinth 5 reps w/ BUE use, elevated plinth (21-inches)   4 Stage Balance Side by side: 10 seconds  Semi-tandem Bilateral fwd: 10 seconds  Tandem Right fwd: 9 seconds, L fwd 4 sec  Single leg Bilateral: NT seconds NT   Interpretation of Score: The complete STEADI assessment is a measure of gait, strength, and balance used in conjunction with a fall risk screening to determine a person's risk and need for interventions.   TUG: A

## 2025-04-21 ENCOUNTER — TREATMENT (OUTPATIENT)
Age: 60
End: 2025-04-21
Payer: COMMERCIAL

## 2025-04-21 DIAGNOSIS — Z74.09 IMPAIRED FUNCTIONAL MOBILITY, BALANCE, GAIT, AND ENDURANCE: ICD-10-CM

## 2025-04-21 DIAGNOSIS — M25.661 STIFFNESS OF RIGHT KNEE: ICD-10-CM

## 2025-04-21 DIAGNOSIS — R26.2 DIFFICULTY WALKING: ICD-10-CM

## 2025-04-21 DIAGNOSIS — M62.81 MUSCLE WEAKNESS: ICD-10-CM

## 2025-04-21 DIAGNOSIS — M25.462 EFFUSION OF LEFT KNEE: ICD-10-CM

## 2025-04-21 DIAGNOSIS — M25.662 KNEE STIFFNESS, LEFT: Primary | ICD-10-CM

## 2025-04-21 PROCEDURE — 97140 MANUAL THERAPY 1/> REGIONS: CPT | Performed by: PHYSICAL THERAPIST

## 2025-04-21 PROCEDURE — 97110 THERAPEUTIC EXERCISES: CPT | Performed by: PHYSICAL THERAPIST

## 2025-04-21 PROCEDURE — 97530 THERAPEUTIC ACTIVITIES: CPT | Performed by: PHYSICAL THERAPIST

## 2025-04-21 NOTE — PROGRESS NOTES
[facility] - [facility]  [address] [facility] [id number]  Dept: [phone]      Physical Therapy Daily Note     Referring MD: Eric Basilio APRN *  Diagnosis:     ICD-10-CM    1. Knee stiffness, left  M25.662       2. Difficulty walking  R26.2       3. Stiffness of right knee  M25.661       4. Effusion of left knee  M25.462       5. Muscle weakness  M62.81       6. Impaired functional mobility, balance, gait, and endurance  Z74.09           Surgery: Left LEG DEBRIDEMENT INCISION AND DRAINAGE - Left Date: 1/13/2025  Therapy precautions:Fall risk and Gait belt for dynamic standing activity, eliquis for several more months  Co-morbidities affecting plan of care: ORIF tibia ~1998 that limited knee flexion, medial meniscus tear R knee, B DVT 2025, diabetes with neuropathy  Chief complaints/history of injury: Pt noted R knee discomfort with every step while walking her dog ~3/25/23 and then felt a loud pop in the knee while going up the steps to the house. Pt diagnosed with medial meniscus tear by Dr. JOLEEN Lindo and she completed 4 sessions of PT at this location 5/17/23-5/30/23, stopping due to extended out of town travel. Pt followed up with Dr. JOLEEN Lindo 9/12/23 and elected to hold off on injection. She was referred ankle/foot for R ankle injury where she received a R ankle lace up brace to address arthritis and calcaneonavicular coalition. Pt caught R toe on steps 9/30/24 and was seen in ED due to increased R knee pain. Pt was discharged home in knee immobilizer with referral to outpatient ortho (Claude Landaverde) where she had joint aspiration and steroid injection to the R knee. Pt returned to the ED 10/7/24 with worsening R knee pain and pain B lower legs. She was admitted and treated for B lower extremity DVTs and then discharged to short term rehab 10/16/24. Pt states that she had a poor experience in rehab with minimal activity and was discharged home after ~ 2 months. Pt reports

## 2025-04-23 ENCOUNTER — TREATMENT (OUTPATIENT)
Age: 60
End: 2025-04-23
Payer: COMMERCIAL

## 2025-04-23 DIAGNOSIS — R26.2 DIFFICULTY WALKING: ICD-10-CM

## 2025-04-23 DIAGNOSIS — M25.662 KNEE STIFFNESS, LEFT: Primary | ICD-10-CM

## 2025-04-23 DIAGNOSIS — M25.462 EFFUSION OF LEFT KNEE: ICD-10-CM

## 2025-04-23 DIAGNOSIS — Z74.09 IMPAIRED FUNCTIONAL MOBILITY, BALANCE, GAIT, AND ENDURANCE: ICD-10-CM

## 2025-04-23 DIAGNOSIS — M62.81 MUSCLE WEAKNESS: ICD-10-CM

## 2025-04-23 DIAGNOSIS — M25.661 STIFFNESS OF RIGHT KNEE: ICD-10-CM

## 2025-04-23 PROCEDURE — 97110 THERAPEUTIC EXERCISES: CPT | Performed by: PHYSICAL THERAPIST

## 2025-04-23 PROCEDURE — 97530 THERAPEUTIC ACTIVITIES: CPT | Performed by: PHYSICAL THERAPIST

## 2025-04-23 PROCEDURE — 97140 MANUAL THERAPY 1/> REGIONS: CPT | Performed by: PHYSICAL THERAPIST

## 2025-04-23 NOTE — PROGRESS NOTES
[facility] - [facility]  [address] [facility] [address]  Dept: [phone]      Physical Therapy Daily Note     Referring MD: Eric Basilio APRN *  Diagnosis:     ICD-10-CM    1. Knee stiffness, left  M25.662       2. Difficulty walking  R26.2       3. Stiffness of right knee  M25.661       4. Effusion of left knee  M25.462       5. Muscle weakness  M62.81       6. Impaired functional mobility, balance, gait, and endurance  Z74.09           Surgery: Left LEG DEBRIDEMENT INCISION AND DRAINAGE - Left Date: 1/13/2025  Therapy precautions:Fall risk and Gait belt for dynamic standing activity, eliquis for several more months  Co-morbidities affecting plan of care: ORIF tibia ~1998 that limited knee flexion, medial meniscus tear R knee, B DVT 2025, diabetes with neuropathy  Chief complaints/history of injury: Pt noted R knee discomfort with every step while walking her dog ~3/25/23 and then felt a loud pop in the knee while going up the steps to the house. Pt diagnosed with medial meniscus tear by Dr. JOLEEN Lindo and she completed 4 sessions of PT at this location 5/17/23-5/30/23, stopping due to extended out of town travel. Pt followed up with Dr. JOLEEN Lindo 9/12/23 and elected to hold off on injection. She was referred ankle/foot for R ankle injury where she received a R ankle lace up brace to address arthritis and calcaneonavicular coalition. Pt caught R toe on steps 9/30/24 and was seen in ED due to increased R knee pain. Pt was discharged home in knee immobilizer with referral to outpatient ortho (Claude Landaverde) where she had joint aspiration and steroid injection to the R knee. Pt returned to the ED 10/7/24 with worsening R knee pain and pain B lower legs. She was admitted and treated for B lower extremity DVTs and then discharged to short term rehab 10/16/24. Pt states that she had a poor experience in rehab with minimal activity and was discharged home after ~ 2 months. Pt reports

## 2025-04-29 ENCOUNTER — TREATMENT (OUTPATIENT)
Age: 60
End: 2025-04-29
Payer: COMMERCIAL

## 2025-04-29 DIAGNOSIS — M25.661 STIFFNESS OF RIGHT KNEE: ICD-10-CM

## 2025-04-29 DIAGNOSIS — Z74.09 IMPAIRED FUNCTIONAL MOBILITY, BALANCE, GAIT, AND ENDURANCE: ICD-10-CM

## 2025-04-29 DIAGNOSIS — R26.2 DIFFICULTY WALKING: ICD-10-CM

## 2025-04-29 DIAGNOSIS — M25.662 KNEE STIFFNESS, LEFT: Primary | ICD-10-CM

## 2025-04-29 DIAGNOSIS — M25.462 EFFUSION OF LEFT KNEE: ICD-10-CM

## 2025-04-29 DIAGNOSIS — M62.81 MUSCLE WEAKNESS: ICD-10-CM

## 2025-04-29 PROCEDURE — 97530 THERAPEUTIC ACTIVITIES: CPT | Performed by: PHYSICAL THERAPIST

## 2025-04-29 PROCEDURE — 97110 THERAPEUTIC EXERCISES: CPT | Performed by: PHYSICAL THERAPIST

## 2025-04-29 NOTE — PROGRESS NOTES
Ankle WFL   WFL   Restricted DF               Treatment provided today:  Therapeutic exercise (26920) x 25 min to develop ROM, strength, endurance and flexibility BLEs.  Current Exercises Past Exercises (not performed today)   Assessment of tolerance of previous therapy session  Sitting LAQ with DF , w/ orange loop 3x5 R, no resist 3x5 L  Seated hamstring curls, orange loop, 2x10 B  Bilateral leg press 60# 3x10  Standing hip abduction, red band, 2x10 B w/ BUE support Nustep level 5 x 5 min  Standing high marches with RW x15 BLE  Seated heel slides with slider x 10 B  Standing knee flexion stretch on stairs, [id number]  Passive B knee flexion/extension  Knee flexion/extension w/ legs on swiss ball 2x10  Gait for endurance w/ rolling walker- focus on gliding walker and decreasing weight through hands x 650 feet       Therapeutic activities (94603) x 15 min using dynamic activities to improve function.  Sit-stand from elevated plinth with light UE (21 inches) 2x10 focusing on power up and slow lowering  Gait with single point cane in R x 100 feet, SBA  6-inch step ups with B rails and SBA x 5 B           ASSESSMENT     Pt continues to demonstrate steady progress towards goals. Gait steadier and required SBA only with single point cane. Leg press initiated with minimal R knee pain noted.       PLAN     Continue with knee ROM, hip, LE and core strengthening, gait and balance activities       Effective Dates/Duration: 3/10/2025 TO 6/8/2025 (90 days).    Frequency: 2x/week   Interventions may include but are not limited to:   (85409) PT re-evaluation  (55053) Therapeutic exercise to develop ROM, strength, endurance and flexibility  (64011) Therapeutic activities using dynamic activities to improve function  (86296) Gait training to address mechanics, proper step length and weight shifting to improve household and community mobility as well as overall safety with ADLs  (70929) Manual therapy techniques to improve joint

## 2025-05-02 ENCOUNTER — TREATMENT (OUTPATIENT)
Age: 60
End: 2025-05-02

## 2025-05-02 DIAGNOSIS — Z74.09 IMPAIRED FUNCTIONAL MOBILITY, BALANCE, GAIT, AND ENDURANCE: ICD-10-CM

## 2025-05-02 DIAGNOSIS — M62.81 MUSCLE WEAKNESS: ICD-10-CM

## 2025-05-02 DIAGNOSIS — M25.661 STIFFNESS OF RIGHT KNEE: ICD-10-CM

## 2025-05-02 DIAGNOSIS — R26.2 DIFFICULTY WALKING: ICD-10-CM

## 2025-05-02 DIAGNOSIS — M25.462 EFFUSION OF LEFT KNEE: ICD-10-CM

## 2025-05-02 DIAGNOSIS — M25.662 KNEE STIFFNESS, LEFT: Primary | ICD-10-CM

## 2025-05-02 DIAGNOSIS — M25.562 ACUTE BILATERAL KNEE PAIN: ICD-10-CM

## 2025-05-02 DIAGNOSIS — M25.561 ACUTE BILATERAL KNEE PAIN: ICD-10-CM

## 2025-05-02 NOTE — PROGRESS NOTES
95 80° 95° 100° L knee limited since ORIF   Ankle WFL   WFL   Restricted DF               Treatment provided today:  Therapeutic exercise (75374) x 15 min to develop ROM, strength, endurance and flexibility BLEs.  Current Exercises Past Exercises (not performed today)   Assessment of tolerance of previous therapy session  Standing knee flexion stretch on stairs, [id number] B  Bilateral leg press 70# 2x10  R unilateral leg press 30# 2x10  L unilateral leg press 30# 2x10  Standing hip abduction, red band, 2x10 B w/ BUE support Nustep level 5 x 5 min  Standing high marches with RW x15 BLE  Seated heel slides with slider x 10 B  Sitting LAQ with DF , w/ orange loop 3x5 R, no resist 3x5 L  Seated hamstring curls, orange loop, 2x10 B  Passive B knee flexion/extension  Knee flexion/extension w/ legs on swiss ball 2x10         Therapeutic activities (03858) x 25 min using dynamic activities to improve function.  Treadmill x 4.5 min, BUE support, 0.6-0.9 mph, CGA and focus on L heel strike to avoid shuffling  Sit-stand from elevated plinth with light UE (21 inches) 2x10 focusing on power up and slow lowering  Gait with single point cane in R x 100 feet, SBA  L 6-inch step ups with B rails and SBA x 10   R 6-inch step ups with B rails and SBA x 5 , x 5 with L step to next step  Side step along rail x 3 B to improve household mobility           ASSESSMENT     Very good control noted with R step ups and decreased UE use during L step ups. Overall, pt continues to make steady progress with strength and mobility.    PLAN     Continue with knee ROM, hip, LE and core strengthening, gait and balance activities, goal to try stair climbing by 5/16      Effective Dates/Duration: 3/10/2025 TO 6/8/2025 (90 days).    Frequency: 2x/week   Interventions may include but are not limited to:   (14638) PT re-evaluation  (44507) Therapeutic exercise to develop ROM, strength, endurance and flexibility  (03744) Therapeutic activities using dynamic

## 2025-05-06 ENCOUNTER — TREATMENT (OUTPATIENT)
Age: 60
End: 2025-05-06
Payer: COMMERCIAL

## 2025-05-06 DIAGNOSIS — M25.662 KNEE STIFFNESS, LEFT: Primary | ICD-10-CM

## 2025-05-06 DIAGNOSIS — Z74.09 IMPAIRED FUNCTIONAL MOBILITY, BALANCE, GAIT, AND ENDURANCE: ICD-10-CM

## 2025-05-06 DIAGNOSIS — M62.81 MUSCLE WEAKNESS: ICD-10-CM

## 2025-05-06 DIAGNOSIS — R26.2 DIFFICULTY WALKING: ICD-10-CM

## 2025-05-06 DIAGNOSIS — M25.661 STIFFNESS OF RIGHT KNEE: ICD-10-CM

## 2025-05-06 DIAGNOSIS — M25.462 EFFUSION OF LEFT KNEE: ICD-10-CM

## 2025-05-06 PROCEDURE — 97110 THERAPEUTIC EXERCISES: CPT | Performed by: PHYSICAL THERAPIST

## 2025-05-06 PROCEDURE — 97140 MANUAL THERAPY 1/> REGIONS: CPT | Performed by: PHYSICAL THERAPIST

## 2025-05-06 PROCEDURE — 97530 THERAPEUTIC ACTIVITIES: CPT | Performed by: PHYSICAL THERAPIST

## 2025-05-06 NOTE — PROGRESS NOTES
[facility] PT INT [facility]  [address] [facility] [address]  Dept: [phone]      Physical Therapy Progress Report     Referring MD: Eric Basilio APRN *  Diagnosis:     ICD-10-CM    1. Knee stiffness, left  M25.662       2. Difficulty walking  R26.2       3. Stiffness of right knee  M25.661       4. Effusion of left knee  M25.462       5. Muscle weakness  M62.81       6. Impaired functional mobility, balance, gait, and endurance  Z74.09           Surgery: Left LEG DEBRIDEMENT INCISION AND DRAINAGE - Left Date: 1/13/2025  Therapy precautions:Fall risk and Gait belt for dynamic standing activity, eliquis for several more months  Co-morbidities affecting plan of care: ORIF tibia ~1998 that limited knee flexion, medial meniscus tear R knee, B DVT 2025, diabetes with neuropathy  Chief complaints/history of injury: Pt noted R knee discomfort with every step while walking her dog ~3/25/23 and then felt a loud pop in the knee while going up the steps to the house. Pt diagnosed with medial meniscus tear by Dr. JOLEEN Lindo and she completed 4 sessions of PT at this location 5/17/23-5/30/23, stopping due to extended out of town travel. Pt followed up with Dr. JOLEEN Lindo 9/12/23 and elected to hold off on injection. She was referred ankle/foot for R ankle injury where she received a R ankle lace up brace to address arthritis and calcaneonavicular coalition. Pt caught R toe on steps 9/30/24 and was seen in ED due to increased R knee pain. Pt was discharged home in knee immobilizer with referral to outpatient ortho (Claude Landaverde) where she had joint aspiration and steroid injection to the R knee. Pt returned to the ED 10/7/24 with worsening R knee pain and pain B lower legs. She was admitted and treated for B lower extremity DVTs and then discharged to short term rehab 10/16/24. Pt states that she had a poor experience in rehab with minimal activity and was discharged home after ~ 2 months. Pt

## 2025-05-08 ENCOUNTER — TREATMENT (OUTPATIENT)
Age: 60
End: 2025-05-08

## 2025-05-08 DIAGNOSIS — M25.462 EFFUSION OF LEFT KNEE: ICD-10-CM

## 2025-05-08 DIAGNOSIS — M25.561 ACUTE BILATERAL KNEE PAIN: ICD-10-CM

## 2025-05-08 DIAGNOSIS — Z74.09 IMPAIRED FUNCTIONAL MOBILITY, BALANCE, GAIT, AND ENDURANCE: ICD-10-CM

## 2025-05-08 DIAGNOSIS — M62.81 MUSCLE WEAKNESS: ICD-10-CM

## 2025-05-08 DIAGNOSIS — M25.562 ACUTE BILATERAL KNEE PAIN: ICD-10-CM

## 2025-05-08 DIAGNOSIS — R26.2 DIFFICULTY WALKING: ICD-10-CM

## 2025-05-08 DIAGNOSIS — M25.661 STIFFNESS OF RIGHT KNEE: ICD-10-CM

## 2025-05-08 DIAGNOSIS — M25.662 KNEE STIFFNESS, LEFT: Primary | ICD-10-CM

## 2025-05-08 NOTE — PROGRESS NOTES
[facility] PT INT - [facility]  [address] [place] [id number]  Dept: [phone]      Physical Therapy Daily Note     Referring MD: Eric Basilio APRN *  Diagnosis:     ICD-10-CM    1. Knee stiffness, left  M25.662       2. Difficulty walking  R26.2       3. Stiffness of right knee  M25.661       4. Effusion of left knee  M25.462       5. Muscle weakness  M62.81       6. Impaired functional mobility, balance, gait, and endurance  Z74.09       7. Acute bilateral knee pain  M25.561     M25.562           Surgery: Left LEG DEBRIDEMENT INCISION AND DRAINAGE - Left Date: 1/13/2025  Therapy precautions:Fall risk and Gait belt for dynamic standing activity, eliquis for several more months  Co-morbidities affecting plan of care: ORIF tibia ~1998 that limited knee flexion, medial meniscus tear R knee, B DVT 2025, diabetes with neuropathy  Chief complaints/history of injury: Pt noted R knee discomfort with every step while walking her dog ~3/25/23 and then felt a loud pop in the knee while going up the steps to the house. Pt diagnosed with medial meniscus tear by Dr. JOLEEN Lindo and she completed 4 sessions of PT at this location 5/17/23-5/30/23, stopping due to extended out of town travel. Pt followed up with Dr. JOLEEN Lindo 9/12/23 and elected to hold off on injection. She was referred ankle/foot for R ankle injury where she received a R ankle lace up brace to address arthritis and calcaneonavicular coalition. Pt caught R toe on steps 9/30/24 and was seen in ED due to increased R knee pain. Pt was discharged home in knee immobilizer with referral to outpatient ortho (Claude Landaverde) where she had joint aspiration and steroid injection to the R knee. Pt returned to the ED 10/7/24 with worsening R knee pain and pain B lower legs. She was admitted and treated for B lower extremity DVTs and then discharged to short term rehab 10/16/24. Pt states that she had a poor experience in rehab with minimal

## 2025-05-12 ENCOUNTER — TREATMENT (OUTPATIENT)
Age: 60
End: 2025-05-12
Payer: COMMERCIAL

## 2025-05-12 DIAGNOSIS — M25.662 KNEE STIFFNESS, LEFT: Primary | ICD-10-CM

## 2025-05-12 DIAGNOSIS — M25.661 STIFFNESS OF RIGHT KNEE: ICD-10-CM

## 2025-05-12 DIAGNOSIS — M25.462 EFFUSION OF LEFT KNEE: ICD-10-CM

## 2025-05-12 DIAGNOSIS — Z74.09 IMPAIRED FUNCTIONAL MOBILITY, BALANCE, GAIT, AND ENDURANCE: ICD-10-CM

## 2025-05-12 DIAGNOSIS — M62.81 MUSCLE WEAKNESS: ICD-10-CM

## 2025-05-12 DIAGNOSIS — E11.42 TYPE 2 DIABETES MELLITUS WITH DIABETIC POLYNEUROPATHY, WITHOUT LONG-TERM CURRENT USE OF INSULIN (HCC): ICD-10-CM

## 2025-05-12 DIAGNOSIS — R26.2 DIFFICULTY WALKING: ICD-10-CM

## 2025-05-12 PROCEDURE — 97530 THERAPEUTIC ACTIVITIES: CPT | Performed by: PHYSICAL THERAPIST

## 2025-05-12 PROCEDURE — 97110 THERAPEUTIC EXERCISES: CPT | Performed by: PHYSICAL THERAPIST

## 2025-05-12 NOTE — TELEPHONE ENCOUNTER
Medication Refill Request    Name of Medication : gabapentin    Strength of Medication: 300 mg    Directions: 2 caps twice daily    30 day or 90 day supply: 90    Preferred Pharmacy: [facility] [address]    Last Appt. Date: 3/27/25    Next Appt. Date: 5/19/25    Additional Information For Provider: none left

## 2025-05-12 NOTE — PROGRESS NOTES
[facility] PT INT - [facility]  [address] [facility] [address]  Dept: [phone]      Physical Therapy Daily Note     Referring MD: Eric Basilio APRN *  Diagnosis:     ICD-10-CM    1. Knee stiffness, left  M25.662       2. Difficulty walking  R26.2       3. Stiffness of right knee  M25.661       4. Effusion of left knee  M25.462       5. Muscle weakness  M62.81       6. Impaired functional mobility, balance, gait, and endurance  Z74.09           Surgery: Left LEG DEBRIDEMENT INCISION AND DRAINAGE - Left Date: 1/13/2025  Therapy precautions:Fall risk and Gait belt for dynamic standing activity, eliquis for several more months  Co-morbidities affecting plan of care: ORIF tibia ~1998 that limited knee flexion, medial meniscus tear R knee, B DVT 2025, diabetes with neuropathy  Chief complaints/history of injury: Pt noted R knee discomfort with every step while walking her dog ~3/25/23 and then felt a loud pop in the knee while going up the steps to the house. Pt diagnosed with medial meniscus tear by Dr. JOLEEN Lindo and she completed 4 sessions of PT at this location 5/17/23-5/30/23, stopping due to extended out of town travel. Pt followed up with Dr. JOLEEN Lindo 9/12/23 and elected to hold off on injection. She was referred ankle/foot for R ankle injury where she received a R ankle lace up brace to address arthritis and calcaneonavicular coalition. Pt caught R toe on steps 9/30/24 and was seen in ED due to increased R knee pain. Pt was discharged home in knee immobilizer with referral to outpatient ortho (Claude Landaverde) where she had joint aspiration and steroid injection to the R knee. Pt returned to the ED 10/7/24 with worsening R knee pain and pain B lower legs. She was admitted and treated for B lower extremity DVTs and then discharged to short term rehab 10/16/24. Pt states that she had a poor experience in rehab with minimal activity and was discharged home after ~ 2 months. Pt reports

## 2025-05-13 RX ORDER — GABAPENTIN 300 MG/1
600 CAPSULE ORAL 2 TIMES DAILY
Qty: 360 CAPSULE | Refills: 0 | Status: SHIPPED | OUTPATIENT
Start: 2025-05-13 | End: 2025-08-11

## 2025-05-13 NOTE — TELEPHONE ENCOUNTER
90 day supply sent in.  Will provide longer term prescription during next office visit on 6/30/25.

## 2025-05-19 ENCOUNTER — OFFICE VISIT (OUTPATIENT)
Dept: INTERNAL MEDICINE CLINIC | Facility: CLINIC | Age: 60
End: 2025-05-19
Payer: COMMERCIAL

## 2025-05-19 ENCOUNTER — TREATMENT (OUTPATIENT)
Age: 60
End: 2025-05-19
Payer: COMMERCIAL

## 2025-05-19 VITALS
HEART RATE: 74 BPM | HEIGHT: 67 IN | TEMPERATURE: 97.4 F | OXYGEN SATURATION: 94 % | WEIGHT: 276 LBS | BODY MASS INDEX: 43.32 KG/M2 | SYSTOLIC BLOOD PRESSURE: 130 MMHG | DIASTOLIC BLOOD PRESSURE: 80 MMHG

## 2025-05-19 DIAGNOSIS — Z74.09 IMPAIRED FUNCTIONAL MOBILITY, BALANCE, GAIT, AND ENDURANCE: ICD-10-CM

## 2025-05-19 DIAGNOSIS — M25.462 EFFUSION OF LEFT KNEE: ICD-10-CM

## 2025-05-19 DIAGNOSIS — M62.81 MUSCLE WEAKNESS: ICD-10-CM

## 2025-05-19 DIAGNOSIS — M25.661 STIFFNESS OF RIGHT KNEE: ICD-10-CM

## 2025-05-19 DIAGNOSIS — N64.4 PAIN OF BOTH BREASTS: Primary | ICD-10-CM

## 2025-05-19 DIAGNOSIS — R26.2 DIFFICULTY WALKING: ICD-10-CM

## 2025-05-19 DIAGNOSIS — E11.42 TYPE 2 DIABETES MELLITUS WITH DIABETIC POLYNEUROPATHY, WITHOUT LONG-TERM CURRENT USE OF INSULIN (HCC): ICD-10-CM

## 2025-05-19 DIAGNOSIS — M25.662 KNEE STIFFNESS, LEFT: Primary | ICD-10-CM

## 2025-05-19 PROCEDURE — 3079F DIAST BP 80-89 MM HG: CPT | Performed by: NURSE PRACTITIONER

## 2025-05-19 PROCEDURE — 99213 OFFICE O/P EST LOW 20 MIN: CPT | Performed by: NURSE PRACTITIONER

## 2025-05-19 PROCEDURE — 97530 THERAPEUTIC ACTIVITIES: CPT | Performed by: PHYSICAL THERAPIST

## 2025-05-19 PROCEDURE — 3044F HG A1C LEVEL LT 7.0%: CPT | Performed by: NURSE PRACTITIONER

## 2025-05-19 PROCEDURE — 97110 THERAPEUTIC EXERCISES: CPT | Performed by: PHYSICAL THERAPIST

## 2025-05-19 PROCEDURE — 3075F SYST BP GE 130 - 139MM HG: CPT | Performed by: NURSE PRACTITIONER

## 2025-05-19 ASSESSMENT — ENCOUNTER SYMPTOMS
VOMITING: 0
SHORTNESS OF BREATH: 0
ABDOMINAL PAIN: 0
COUGH: 0
NAUSEA: 0

## 2025-05-19 NOTE — PROGRESS NOTES
not taking: Reported on 5/19/2025), Disp: 100 each, Rfl: 3    OneTouch Delica Lancets 33G MISC, Use to check glucose once daily.  Dx: E11.42 (Patient not taking: Reported on 5/19/2025), Disp: 100 each, Rfl: 3    hyoscyamine (LEVSIN/SL) 0.125 MG sublingual tablet, Place 1 tablet under the tongue every 6 hours as needed for Diarrhea (Patient not taking: Reported on 5/19/2025), Disp: 90 tablet, Rfl: 3    No Known Allergies    Review of Systems   Constitutional:  Negative for chills and fever.   Respiratory:  Negative for cough and shortness of breath.    Cardiovascular:  Negative for chest pain.   Gastrointestinal:  Negative for abdominal pain, nausea and vomiting.       Objective:  /80 (BP Site: Left Upper Arm, Patient Position: Sitting, BP Cuff Size: Medium Adult)   Pulse 74   Temp 97.4 °F (36.3 °C) (Temporal)   Ht 1.702 m (5' 7\")   Wt 125.2 kg (276 lb)   LMP  (LMP Unknown)   SpO2 94%   BMI 43.23 kg/m²     Examination:  Physical Exam  Vitals and nursing note reviewed.   Constitutional:       General: She is not in acute distress.     Appearance: Normal appearance.   Cardiovascular:      Rate and Rhythm: Normal rate and regular rhythm.   Pulmonary:      Effort: Pulmonary effort is normal. No respiratory distress.      Breath sounds: Normal breath sounds.   Chest:   Breasts:     Right: Tenderness present. No swelling, inverted nipple, mass, nipple discharge or skin change.      Left: Tenderness present. No swelling, inverted nipple, mass, nipple discharge or skin change.   Abdominal:      General: Bowel sounds are normal.      Palpations: Abdomen is soft.   Lymphadenopathy:      Upper Body:      Right upper body: No supraclavicular adenopathy.      Left upper body: No supraclavicular adenopathy.   Skin:     General: Skin is warm and dry.   Neurological:      Mental Status: She is alert and oriented to person, place, and time.   Psychiatric:         Mood and Affect: Mood normal.           Mammogram Result

## 2025-05-19 NOTE — PROGRESS NOTES
[facility] PT INT - [facility]  [address] [facility] [address]  Dept: [phone]      Physical Therapy Daily Note     Referring MD: Eric Basilio APRN *  Diagnosis:     ICD-10-CM    1. Knee stiffness, left  M25.662       2. Difficulty walking  R26.2       3. Stiffness of right knee  M25.661       4. Effusion of left knee  M25.462       5. Muscle weakness  M62.81       6. Impaired functional mobility, balance, gait, and endurance  Z74.09           Surgery: Left LEG DEBRIDEMENT INCISION AND DRAINAGE - Left Date: 1/13/2025  Therapy precautions:Fall risk and Gait belt for dynamic standing activity, eliquis for several more months  Co-morbidities affecting plan of care: ORIF tibia ~1998 that limited knee flexion, medial meniscus tear R knee, B DVT 2025, diabetes with neuropathy  Chief complaints/history of injury: Pt noted R knee discomfort with every step while walking her dog ~3/25/23 and then felt a loud pop in the knee while going up the steps to the house. Pt diagnosed with medial meniscus tear by Dr. JOLEEN Lindo and she completed 4 sessions of PT at this location 5/17/23-5/30/23, stopping due to extended out of town travel. Pt followed up with Dr. JOLEEN Lindo 9/12/23 and elected to hold off on injection. She was referred ankle/foot for R ankle injury where she received a R ankle lace up brace to address arthritis and calcaneonavicular coalition. Pt caught R toe on steps 9/30/24 and was seen in ED due to increased R knee pain. Pt was discharged home in knee immobilizer with referral to outpatient ortho (Claude Landaverde) where she had joint aspiration and steroid injection to the R knee. Pt returned to the ED 10/7/24 with worsening R knee pain and pain B lower legs. She was admitted and treated for B lower extremity DVTs and then discharged to short term rehab 10/16/24. Pt states that she had a poor experience in rehab with minimal activity and was discharged home after ~ 2 months. Pt reports

## 2025-05-22 ENCOUNTER — TREATMENT (OUTPATIENT)
Age: 60
End: 2025-05-22
Payer: COMMERCIAL

## 2025-05-22 DIAGNOSIS — Z74.09 IMPAIRED FUNCTIONAL MOBILITY, BALANCE, GAIT, AND ENDURANCE: ICD-10-CM

## 2025-05-22 DIAGNOSIS — M62.81 MUSCLE WEAKNESS: ICD-10-CM

## 2025-05-22 DIAGNOSIS — R26.2 DIFFICULTY WALKING: ICD-10-CM

## 2025-05-22 DIAGNOSIS — M25.661 STIFFNESS OF RIGHT KNEE: ICD-10-CM

## 2025-05-22 DIAGNOSIS — M25.662 KNEE STIFFNESS, LEFT: Primary | ICD-10-CM

## 2025-05-22 DIAGNOSIS — M25.462 EFFUSION OF LEFT KNEE: ICD-10-CM

## 2025-05-22 PROCEDURE — 97110 THERAPEUTIC EXERCISES: CPT | Performed by: PHYSICAL THERAPIST

## 2025-05-27 ENCOUNTER — TREATMENT (OUTPATIENT)
Age: 60
End: 2025-05-27
Payer: COMMERCIAL

## 2025-05-27 ENCOUNTER — OFFICE VISIT (OUTPATIENT)
Dept: ORTHOPEDIC SURGERY | Age: 60
End: 2025-05-27
Payer: COMMERCIAL

## 2025-05-27 DIAGNOSIS — R26.2 DIFFICULTY WALKING: ICD-10-CM

## 2025-05-27 DIAGNOSIS — Z74.09 IMPAIRED FUNCTIONAL MOBILITY, BALANCE, GAIT, AND ENDURANCE: ICD-10-CM

## 2025-05-27 DIAGNOSIS — M25.561 ACUTE BILATERAL KNEE PAIN: ICD-10-CM

## 2025-05-27 DIAGNOSIS — M25.661 STIFFNESS OF RIGHT KNEE: ICD-10-CM

## 2025-05-27 DIAGNOSIS — M17.11 PRIMARY OSTEOARTHRITIS OF RIGHT KNEE: Primary | ICD-10-CM

## 2025-05-27 DIAGNOSIS — M62.81 MUSCLE WEAKNESS: ICD-10-CM

## 2025-05-27 DIAGNOSIS — M25.462 EFFUSION OF LEFT KNEE: ICD-10-CM

## 2025-05-27 DIAGNOSIS — M25.662 KNEE STIFFNESS, LEFT: Primary | ICD-10-CM

## 2025-05-27 DIAGNOSIS — M25.562 ACUTE BILATERAL KNEE PAIN: ICD-10-CM

## 2025-05-27 PROCEDURE — 97110 THERAPEUTIC EXERCISES: CPT | Performed by: PHYSICAL THERAPIST

## 2025-05-27 PROCEDURE — 99214 OFFICE O/P EST MOD 30 MIN: CPT | Performed by: ORTHOPAEDIC SURGERY

## 2025-05-27 PROCEDURE — 97140 MANUAL THERAPY 1/> REGIONS: CPT | Performed by: PHYSICAL THERAPIST

## 2025-05-27 PROCEDURE — 20610 DRAIN/INJ JOINT/BURSA W/O US: CPT | Performed by: ORTHOPAEDIC SURGERY

## 2025-05-27 RX ORDER — METHYLPREDNISOLONE ACETATE 40 MG/ML
40 INJECTION, SUSPENSION INTRA-ARTICULAR; INTRALESIONAL; INTRAMUSCULAR; SOFT TISSUE ONCE
Status: COMPLETED | OUTPATIENT
Start: 2025-05-27 | End: 2025-05-27

## 2025-05-27 RX ADMIN — METHYLPREDNISOLONE ACETATE 40 MG: 40 INJECTION, SUSPENSION INTRA-ARTICULAR; INTRALESIONAL; INTRAMUSCULAR; SOFT TISSUE at 15:31

## 2025-05-27 NOTE — PROGRESS NOTES
Patient ID:  Maricel Gallego  [id number]  59 y.o.  1965    Today: May 27, 2025          Chief Complaint:  Right Knee pain    HPI:       Maricel Gallego is a 59 y.o. female seen for evaluation and treatment of followup of right knee osteoarthritis. The patient reports pain along the joint lines, reports stiffness of the knee with prolonged inactivity, and swelling/pain at the end of the day and after increased physical activity. Generally, symptoms improve with sitting/rest. The pain affects the patient’s activities of daily living and quality of life. Patient reports progressive pain and instability in the knee. The pain has been ongoing for an extended period of time. Pain ranges from approximately 4-8 in a cyclical fashion with periods of acute exacerbation.     Patient has attempted prior conservative treatment including medications and activity modification.    Treatment to date has included OTC medications and activity modification. At one point the patient has had a steroid injection in the right knee which provided 3+ months of pain improvement.    Past Medical History:  Past Medical History:   Diagnosis Date    Atherosclerosis of arteries 03/18/2024    Scattered Throughout Abdominal Vasculature per CT Abdomen/Pelvis    Bacterial infection of knee joint (HCC)     Contact dermatitis 7/21/2021    COVID-19 12/2019    Diabetes ([facility])     Type 2, managed with oral medication. Does not take surgar daily. No problems with hypoglycemia. Patient reports average BS low 100's    Diabetic polyneuropathy ([facility])     DVT (deep venous thrombosis) ([facility])     Hx-right leg, on Eliquis, Followed by PCP    Elevated C-reactive protein     GERD (gastroesophageal reflux disease)     managed with medication    Dillon's disease     Hypercholesterolemia     Hypertension     managed with medication    Microcytic hypochromic anemia     Overactive bladder     Panic attacks     PRN medication    Situational mixed anxiety and

## 2025-05-27 NOTE — PROGRESS NOTES
[facility] PT INT - [facility]  [address] [place] [id number]  Dept: [phone]      Physical Therapy Daily Note     Referring MD: Eric Basilio APRN *  Diagnosis:     ICD-10-CM    1. Knee stiffness, left  M25.662       2. Difficulty walking  R26.2       3. Stiffness of right knee  M25.661       4. Effusion of left knee  M25.462       5. Muscle weakness  M62.81       6. Impaired functional mobility, balance, gait, and endurance  Z74.09       7. Acute bilateral knee pain  M25.561     M25.562           Surgery: Left LEG DEBRIDEMENT INCISION AND DRAINAGE - Left Date: 1/13/2025  Therapy precautions:Fall risk and Gait belt for dynamic standing activity, eliquis for several more months  Co-morbidities affecting plan of care: ORIF tibia ~1998 that limited knee flexion, medial meniscus tear R knee, B DVT 2025, diabetes with neuropathy  Chief complaints/history of injury: Pt noted R knee discomfort with every step while walking her dog ~3/25/23 and then felt a loud pop in the knee while going up the steps to the house. Pt diagnosed with medial meniscus tear by Dr. JOLEEN Lindo and she completed 4 sessions of PT at this location 5/17/23-5/30/23, stopping due to extended out of town travel. Pt followed up with Dr. JOLEEN Lindo 9/12/23 and elected to hold off on injection. She was referred ankle/foot for R ankle injury where she received a R ankle lace up brace to address arthritis and calcaneonavicular coalition. Pt caught R toe on steps 9/30/24 and was seen in ED due to increased R knee pain. Pt was discharged home in knee immobilizer with referral to outpatient ortho (Claude Landaverde) where she had joint aspiration and steroid injection to the R knee. Pt returned to the ED 10/7/24 with worsening R knee pain and pain B lower legs. She was admitted and treated for B lower extremity DVTs and then discharged to short term rehab 10/16/24. Pt states that she had a poor experience in rehab with minimal

## 2025-05-29 DIAGNOSIS — F32.A ANXIETY AND DEPRESSION: ICD-10-CM

## 2025-05-29 DIAGNOSIS — F41.0 PANIC ATTACKS: ICD-10-CM

## 2025-05-29 DIAGNOSIS — F41.9 ANXIETY AND DEPRESSION: ICD-10-CM

## 2025-05-29 RX ORDER — DULOXETIN HYDROCHLORIDE 60 MG/1
60 CAPSULE, DELAYED RELEASE ORAL 2 TIMES DAILY
Qty: 180 CAPSULE | Refills: 1 | OUTPATIENT
Start: 2025-05-29

## 2025-06-03 ENCOUNTER — TELEPHONE (OUTPATIENT)
Age: 60
End: 2025-06-03

## 2025-06-03 NOTE — TELEPHONE ENCOUNTER
Pt did not show for today's scheduled PT appointment. LVM for pt to contact therapy dept to reschedule and left date and time of next PT appt.

## 2025-06-10 ENCOUNTER — TREATMENT (OUTPATIENT)
Age: 60
End: 2025-06-10
Payer: COMMERCIAL

## 2025-06-10 DIAGNOSIS — M25.661 STIFFNESS OF RIGHT KNEE: ICD-10-CM

## 2025-06-10 DIAGNOSIS — M25.662 KNEE STIFFNESS, LEFT: Primary | ICD-10-CM

## 2025-06-10 DIAGNOSIS — Z74.09 IMPAIRED FUNCTIONAL MOBILITY, BALANCE, GAIT, AND ENDURANCE: ICD-10-CM

## 2025-06-10 DIAGNOSIS — M25.462 EFFUSION OF LEFT KNEE: ICD-10-CM

## 2025-06-10 DIAGNOSIS — M25.561 ACUTE BILATERAL KNEE PAIN: ICD-10-CM

## 2025-06-10 DIAGNOSIS — M25.562 ACUTE BILATERAL KNEE PAIN: ICD-10-CM

## 2025-06-10 DIAGNOSIS — M62.81 MUSCLE WEAKNESS: ICD-10-CM

## 2025-06-10 DIAGNOSIS — R26.2 DIFFICULTY WALKING: ICD-10-CM

## 2025-06-10 PROCEDURE — 97140 MANUAL THERAPY 1/> REGIONS: CPT | Performed by: PHYSICAL THERAPIST

## 2025-06-10 PROCEDURE — 97110 THERAPEUTIC EXERCISES: CPT | Performed by: PHYSICAL THERAPIST

## 2025-06-10 NOTE — PROGRESS NOTES
wide base of support  Treatment provided today:  Therapeutic exercise (29740) x 32 min to develop ROM, strength, endurance and flexibility BLEs.  Current Exercises Past Exercises (not performed today)   Assessment of tolerance of previous therapy session  Nustep level 5 x 5 min  Standing heel raises 3x10  Standing high marches with RW x15 BLE  Seated LAQ + DF, orange R-loop, 2x5 B  Seated hamstring curls, orange loop, 2x10 B  Seated passive knee flexion B  Passive B knee flexion/extension   Knee flexion/extension w/ legs on swiss ball 2x10  Bilateral leg press 90# 3x10   R unilateral leg press 50# 2x10  L unilateral leg press 50# 2x10  Standing hip abduction, red band, 2x10 B w/ BUE support  Standing knee flexion stretch on stairs, [id number] B  Sitting LAQ with DF , w/ orange loop 3x5 R, no resist 3x5 L  Active DF at end range flexion 3x10 B       Manual therapy (56086) x 8 min utilizing techniques to improve joint and/or soft tissue mobility, ROM, and function as well as helping to decrease pain/spasms and swelling.  Palpation and assessment of soft tissue, muscles, and landmarks   B patella mobs inferior/superior  Screw home mobilization to increase terminal knee extension B  MFR anterior knee to increase knee flexion B    ASSESSMENT     Start of care: 3/10/2025   As of 6/10/2025, Maricel Valladares Lashonda has attended 22 PT sessions. Pt's attendance has been consistent with plan of care. Pt has progressed well with treatment.  She has met 5/5 short term goals, 4/5 long term goals, has subjective reports of decreased L knee pain, improved sit-stand, improved walking tolerance, and continued overall stiffness. Since last progress report 5/6/25, pt demonstrates a 2.5 sec improvement in TUG using RW, 2 rep increase in 30 sec chair stand, improved LE strength, and improved ability to ambulate with cane.  Continued deficits include: Pain, Localized swelling/edema, ROM limitations, Soft tissue restrictions, Strength deficits,

## 2025-06-17 ENCOUNTER — TREATMENT (OUTPATIENT)
Age: 60
End: 2025-06-17
Payer: COMMERCIAL

## 2025-06-17 DIAGNOSIS — M25.661 STIFFNESS OF RIGHT KNEE: ICD-10-CM

## 2025-06-17 DIAGNOSIS — M25.462 EFFUSION OF LEFT KNEE: ICD-10-CM

## 2025-06-17 DIAGNOSIS — M25.662 KNEE STIFFNESS, LEFT: Primary | ICD-10-CM

## 2025-06-17 DIAGNOSIS — R26.2 DIFFICULTY WALKING: ICD-10-CM

## 2025-06-17 DIAGNOSIS — M62.81 MUSCLE WEAKNESS: ICD-10-CM

## 2025-06-17 PROCEDURE — 97110 THERAPEUTIC EXERCISES: CPT | Performed by: PHYSICAL THERAPIST

## 2025-06-17 NOTE — PROGRESS NOTES
leg Bilateral: NT seconds NT Side by side: 10 seconds  Semi-tandem Bilateral fwd: 10 seconds  Tandem Right fwd: 9 seconds, L fwd 4 sec  Single leg Bilateral: NT seconds Side by side: 10 seconds  Semi-tandem Bilateral fwd: 10 seconds  Tandem Right fwd: 10 seconds, L fwd 10 sec  Single leg R 0 sec, L 1 sec     ROM Measures:    R 3/10 R 4/14 R 5/6 R 6/10 L 3/10 L 4/14 L 5/6 L 6/10 Comment   Knee Extension Lacks 10° Lacks 5° Lacks 1° 0° Lacks 25° Lacks 5° 0° 0°    Knee Flexion 70° 95° 94° 95° 80° 95° 101° 105° L knee limited since ORIF     Strength/MMT (0-5 Scale):    R 3/10 R 4/14 R 6/10 L 3/10  L 4/14 L 6/10   Hip Flexion 3+ 4 5 3+ 4 5   Hip Extension     4   NT 4   Hip Abduction 3+ 4 5 4 4 5   Knee Extension 5 5 5 3- 3+ 5   Knee Flexion     5     5   Quad set fair   good fair   good        Treatment provided today:  Therapeutic exercise (42285) x 40 min to develop ROM, strength, endurance and flexibility BLEs.  Current Exercises Past Exercises (not performed today)   Assessment of tolerance of previous therapy session  Nustep level 5 x 5 min  Bilateral leg press 90# 2x15   R unilateral leg press 50# 3x10  L unilateral leg press 50# 3x10  B hamstring curls 40# 3x8  Standing heel raises 3x10  Standing high marches with RW x15 BLE     Knee flexion/extension w/ legs on swiss ball 2x10  Standing hip abduction, red band, 2x10 B w/ BUE support  Standing knee flexion stretch on stairs, [id number] B  Active DF at end range flexion 3x10 B  Seated LAQ + DF, orange R-loop, 2x5 B  Seated hamstring curls, orange loop, 2x10 B  Seated passive knee flexion B  Passive B knee flexion/extension         ASSESSMENT     Initiated machine hamstring curls and increased reps with single leg press. Pt progressing well with strength.     PLAN     Resume stairs and walking with less support.     Effective Dates/Duration: 6/10/2025 TO 7/23/2025  (42 days).    Frequency: 1x/week   Interventions may include but are not limited to:   (89185) PT

## 2025-06-23 DIAGNOSIS — F32.A ANXIETY AND DEPRESSION: ICD-10-CM

## 2025-06-23 DIAGNOSIS — F41.0 PANIC ATTACKS: ICD-10-CM

## 2025-06-23 DIAGNOSIS — F41.9 ANXIETY AND DEPRESSION: ICD-10-CM

## 2025-06-23 RX ORDER — DULOXETIN HYDROCHLORIDE 60 MG/1
60 CAPSULE, DELAYED RELEASE ORAL 2 TIMES DAILY
Qty: 180 CAPSULE | Refills: 1 | OUTPATIENT
Start: 2025-06-23

## 2025-06-24 ENCOUNTER — OFFICE VISIT (OUTPATIENT)
Dept: ORTHOPEDIC SURGERY | Age: 60
End: 2025-06-24
Payer: COMMERCIAL

## 2025-06-24 ENCOUNTER — HOSPITAL ENCOUNTER (OUTPATIENT)
Dept: MAMMOGRAPHY | Age: 60
Discharge: HOME | End: 2025-06-27
Payer: COMMERCIAL

## 2025-06-24 DIAGNOSIS — N64.4 PAIN OF BOTH BREASTS: ICD-10-CM

## 2025-06-24 DIAGNOSIS — M17.11 PRIMARY OSTEOARTHRITIS OF RIGHT KNEE: Primary | ICD-10-CM

## 2025-06-24 PROCEDURE — G0279 TOMOSYNTHESIS, MAMMO: HCPCS

## 2025-06-24 PROCEDURE — 99214 OFFICE O/P EST MOD 30 MIN: CPT | Performed by: ORTHOPAEDIC SURGERY

## 2025-06-24 NOTE — PROGRESS NOTES
Patient ID:  Maricel Gallego  [id number]  60 y.o.  1965    Today: June 24, 2025          Chief Complaint:  Right Knee pain    HPI:       Maricel Gallego is a 60 y.o. female seen for evaluation and treatment of followup of right knee osteoarthritis. The patient reports pain along the joint lines, reports stiffness of the knee with prolonged inactivity, and swelling/pain at the end of the day and after increased physical activity. Generally, symptoms improve with sitting/rest. The pain affects the patient’s activities of daily living and quality of life. Patient reports progressive pain and instability in the knee. The pain has been ongoing for an extended period of time. Pain ranges from approximately 4-8 in a cyclical fashion with periods of acute exacerbation.     Prior procedures on the knee include none.    Patient has been attempted prior conservative treatment including Over-the-Counter medications including NSAID and/or Tylenol and Activity Modifications. They have had success with prior treatments. Prior Corticosteroid Injection has provided complete although short lived relief.    Past Medical History:  Past Medical History:   Diagnosis Date    Atherosclerosis of arteries 03/18/2024    Scattered Throughout Abdominal Vasculature per CT Abdomen/Pelvis    Bacterial infection of knee joint (HCC)     Contact dermatitis 7/21/2021    COVID-19 12/2019    Diabetes ([facility])     Type 2, managed with oral medication. Does not take surgar daily. No problems with hypoglycemia. Patient reports average BS low 100's    Diabetic polyneuropathy (HCC)     DVT (deep venous thrombosis) ([facility])     Hx-right leg, on Eliquis, Followed by PCP    Elevated C-reactive protein     GERD (gastroesophageal reflux disease)     managed with medication    Holstein's disease     Hypercholesterolemia     Hypertension     managed with medication    Microcytic hypochromic anemia     Overactive bladder     Panic attacks     PRN medication

## 2025-06-25 ENCOUNTER — RESULTS FOLLOW-UP (OUTPATIENT)
Dept: INTERNAL MEDICINE CLINIC | Facility: CLINIC | Age: 60
End: 2025-06-25

## 2025-06-25 ENCOUNTER — TELEPHONE (OUTPATIENT)
Age: 60
End: 2025-06-25

## 2025-06-25 NOTE — TELEPHONE ENCOUNTER
Pt did not show for their scheduled therapy appointment today.    Reason: unknown  Communication: left voicemail at mobile number

## 2025-06-26 NOTE — PROGRESS NOTES
mammography last week with negative findings but pain persists.  She did report drinking 36 oz of Sunkist Zero daily for the past 3-4 months.      Review of Systems   Constitutional:  Positive for unexpected weight change (17 lb weight gain in 3 months). Negative for fatigue.   Eyes:  Positive for visual disturbance.   Respiratory:  Negative for shortness of breath.    Cardiovascular:  Positive for leg swelling (bilateral legs). Negative for chest pain and palpitations.   Endocrine: Negative for polydipsia.   Genitourinary:  Positive for frequency.   Musculoskeletal:  Negative for myalgias.   Neurological:  Negative for dizziness, numbness and headaches.        Positive for paresthesia - bilateral feet   Psychiatric/Behavioral:  Negative for dysphoric mood and sleep disturbance. The patient is not nervous/anxious.           /64 (BP Site: Left Upper Arm, Patient Position: Sitting, BP Cuff Size: Large Adult)   Pulse 82   Temp 97 °F (36.1 °C) (Temporal)   Ht 1.702 m (5' 7\")   Wt 129.3 kg (285 lb)   LMP  (LMP Unknown)   SpO2 96%   BMI 44.64 kg/m²         Objective   Physical Exam  Constitutional:       Appearance: Normal appearance. She is obese.   HENT:      Head: Normocephalic and atraumatic.   Eyes:      Conjunctiva/sclera: Conjunctivae normal.      Pupils: Pupils are equal, round, and reactive to light.   Neck:      Vascular: No carotid bruit.   Cardiovascular:      Rate and Rhythm: Normal rate and regular rhythm.      Heart sounds: Normal heart sounds.   Pulmonary:      Effort: Pulmonary effort is normal.      Breath sounds: Normal breath sounds.   Musculoskeletal:         General: Normal range of motion.      Cervical back: Normal range of motion.      Right lower leg: Edema (trace) present.      Left lower leg: Edema (trace) present.   Skin:     General: Skin is warm and dry.   Neurological:      Mental Status: She is alert and oriented to person, place, and time.      Comments: Visual

## 2025-06-30 ENCOUNTER — TREATMENT (OUTPATIENT)
Age: 60
End: 2025-06-30
Payer: COMMERCIAL

## 2025-06-30 DIAGNOSIS — M25.662 KNEE STIFFNESS, LEFT: Primary | ICD-10-CM

## 2025-06-30 DIAGNOSIS — M25.661 STIFFNESS OF RIGHT KNEE: ICD-10-CM

## 2025-06-30 DIAGNOSIS — Z74.09 IMPAIRED FUNCTIONAL MOBILITY, BALANCE, GAIT, AND ENDURANCE: ICD-10-CM

## 2025-06-30 DIAGNOSIS — M25.462 EFFUSION OF LEFT KNEE: ICD-10-CM

## 2025-06-30 DIAGNOSIS — R26.2 DIFFICULTY WALKING: ICD-10-CM

## 2025-06-30 DIAGNOSIS — M62.81 MUSCLE WEAKNESS: ICD-10-CM

## 2025-06-30 DIAGNOSIS — M25.562 ACUTE BILATERAL KNEE PAIN: ICD-10-CM

## 2025-06-30 DIAGNOSIS — M25.561 ACUTE BILATERAL KNEE PAIN: ICD-10-CM

## 2025-06-30 PROCEDURE — 97140 MANUAL THERAPY 1/> REGIONS: CPT | Performed by: PHYSICAL THERAPIST

## 2025-06-30 PROCEDURE — 97110 THERAPEUTIC EXERCISES: CPT | Performed by: PHYSICAL THERAPIST

## 2025-06-30 NOTE — PROGRESS NOTES
Health Maintenance       Influenza Vaccine (1)  Order placed this encounter    COVID-19 Vaccine (4 - Pediatric 2024-25 season)  Overdue since 9/1/2024    Annual Physical (ages 3 - 21) (Yearly)  Due since 3/18/2025          Following review of the above:  Pended orders    Note: Refer to final orders and clinician documentation.       [email or web address]  Date: 05/19/2025  Prepared by: Mily White    Exercises  - Seated Knee Flexion Stretch  - 2 x daily - 1 sets - 4 reps - 30 hold  - Seated Hamstring Stretch  - 1 x daily - 1 sets - 4 reps - 30 hold  - Standing Heel Raise with Support  - 1 x daily - 3 sets - 10 reps  - Standing Hip Extension with Counter Support  - 1 x daily - 3 sets - 10 reps  - Sit to Stand with Counter Support  - 1 x daily - 2 sets - 5 reps  - Standing Marching  - 1 x daily - 2 sets - 10 reps  - Standing Knee Flexion AROM with Chair Support  - 1 x daily - 2 sets - 10 reps  - Side Stepping with Counter Support  - 5 x weekly - 2 sets - 10 reps - 5 hold  - Seated Knee Extension with Resistance  - 5 x weekly - 2-3 sets - 10-15 reps - 2 hold

## 2025-07-02 DIAGNOSIS — E11.42 TYPE 2 DIABETES MELLITUS WITH DIABETIC POLYNEUROPATHY, WITHOUT LONG-TERM CURRENT USE OF INSULIN (HCC): ICD-10-CM

## 2025-07-02 DIAGNOSIS — R74.01 ELEVATED AST (SGOT): ICD-10-CM

## 2025-07-02 DIAGNOSIS — I10 ESSENTIAL HYPERTENSION: ICD-10-CM

## 2025-07-02 DIAGNOSIS — D50.9 IRON DEFICIENCY ANEMIA, UNSPECIFIED IRON DEFICIENCY ANEMIA TYPE: ICD-10-CM

## 2025-07-02 DIAGNOSIS — E78.2 MIXED HYPERLIPIDEMIA: ICD-10-CM

## 2025-07-02 LAB
ALBUMIN SERPL-MCNC: 3.5 G/DL (ref 3.2–4.6)
ALBUMIN/GLOB SERPL: 0.9 (ref 1–1.9)
ALP SERPL-CCNC: 120 U/L (ref 35–104)
ALT SERPL-CCNC: 16 U/L (ref 8–45)
ANION GAP SERPL CALC-SCNC: 10 MMOL/L (ref 7–16)
AST SERPL-CCNC: 23 U/L (ref 15–37)
BASOPHILS # BLD: 0.03 K/UL (ref 0–0.2)
BASOPHILS NFR BLD: 0.5 % (ref 0–2)
BILIRUB SERPL-MCNC: 0.4 MG/DL (ref 0–1.2)
BUN SERPL-MCNC: 18 MG/DL (ref 8–23)
CALCIUM SERPL-MCNC: 9.7 MG/DL (ref 8.8–10.2)
CHLORIDE SERPL-SCNC: 105 MMOL/L (ref 98–107)
CHOLEST SERPL-MCNC: 239 MG/DL (ref 0–200)
CO2 SERPL-SCNC: 26 MMOL/L (ref 20–29)
CREAT SERPL-MCNC: 0.8 MG/DL (ref 0.6–1.1)
CREAT UR-MCNC: 75.5 MG/DL (ref 28–217)
DIFFERENTIAL METHOD BLD: ABNORMAL
EOSINOPHIL # BLD: 0.2 K/UL (ref 0–0.8)
EOSINOPHIL NFR BLD: 3.3 % (ref 0.5–7.8)
ERYTHROCYTE [DISTWIDTH] IN BLOOD BY AUTOMATED COUNT: 17.2 % (ref 11.9–14.6)
EST. AVERAGE GLUCOSE BLD GHB EST-MCNC: 120 MG/DL
GLOBULIN SER CALC-MCNC: 4.1 G/DL (ref 2.3–3.5)
GLUCOSE SERPL-MCNC: 99 MG/DL (ref 70–99)
HBA1C MFR BLD: 5.8 % (ref 0–5.6)
HCT VFR BLD AUTO: 42.7 % (ref 35.8–46.3)
HDLC SERPL-MCNC: 62 MG/DL (ref 40–60)
HDLC SERPL: 3.9 (ref 0–5)
HGB BLD-MCNC: 12.7 G/DL (ref 11.7–15.4)
IMM GRANULOCYTES # BLD AUTO: 0.01 K/UL (ref 0–0.5)
IMM GRANULOCYTES NFR BLD AUTO: 0.2 % (ref 0–5)
LDLC SERPL CALC-MCNC: 154 MG/DL (ref 0–100)
LYMPHOCYTES # BLD: 2.35 K/UL (ref 0.5–4.6)
LYMPHOCYTES NFR BLD: 39.2 % (ref 13–44)
MCH RBC QN AUTO: 25 PG (ref 26.1–32.9)
MCHC RBC AUTO-ENTMCNC: 29.7 G/DL (ref 31.4–35)
MCV RBC AUTO: 84.2 FL (ref 82–102)
MICROALBUMIN UR-MCNC: 1.67 MG/DL (ref 0–20)
MICROALBUMIN/CREAT UR-RTO: 22 MG/G (ref 0–30)
MONOCYTES # BLD: 0.34 K/UL (ref 0.1–1.3)
MONOCYTES NFR BLD: 5.7 % (ref 4–12)
NEUTS SEG # BLD: 3.07 K/UL (ref 1.7–8.2)
NEUTS SEG NFR BLD: 51.1 % (ref 43–78)
NRBC # BLD: 0 K/UL (ref 0–0.2)
PLATELET # BLD AUTO: 227 K/UL (ref 150–450)
PMV BLD AUTO: 10.9 FL (ref 9.4–12.3)
POTASSIUM SERPL-SCNC: 4.3 MMOL/L (ref 3.5–5.1)
PROT SERPL-MCNC: 7.6 G/DL (ref 6.3–8.2)
RBC # BLD AUTO: 5.07 M/UL (ref 4.05–5.2)
SODIUM SERPL-SCNC: 141 MMOL/L (ref 136–145)
TRIGL SERPL-MCNC: 118 MG/DL (ref 0–150)
VLDLC SERPL CALC-MCNC: 24 MG/DL (ref 6–23)
WBC # BLD AUTO: 6 K/UL (ref 4.3–11.1)

## 2025-07-07 ENCOUNTER — OFFICE VISIT (OUTPATIENT)
Dept: INTERNAL MEDICINE CLINIC | Facility: CLINIC | Age: 60
End: 2025-07-07
Payer: COMMERCIAL

## 2025-07-07 ENCOUNTER — TREATMENT (OUTPATIENT)
Age: 60
End: 2025-07-07
Payer: COMMERCIAL

## 2025-07-07 VITALS
HEART RATE: 82 BPM | HEIGHT: 67 IN | OXYGEN SATURATION: 96 % | BODY MASS INDEX: 44.73 KG/M2 | TEMPERATURE: 97 F | SYSTOLIC BLOOD PRESSURE: 110 MMHG | DIASTOLIC BLOOD PRESSURE: 64 MMHG | WEIGHT: 285 LBS

## 2025-07-07 DIAGNOSIS — M25.662 KNEE STIFFNESS, LEFT: Primary | ICD-10-CM

## 2025-07-07 DIAGNOSIS — E78.2 MIXED HYPERLIPIDEMIA: ICD-10-CM

## 2025-07-07 DIAGNOSIS — I10 ESSENTIAL HYPERTENSION: ICD-10-CM

## 2025-07-07 DIAGNOSIS — Z74.09 IMPAIRED FUNCTIONAL MOBILITY, BALANCE, GAIT, AND ENDURANCE: ICD-10-CM

## 2025-07-07 DIAGNOSIS — D50.9 IRON DEFICIENCY ANEMIA, UNSPECIFIED IRON DEFICIENCY ANEMIA TYPE: ICD-10-CM

## 2025-07-07 DIAGNOSIS — F41.0 PANIC ATTACKS: ICD-10-CM

## 2025-07-07 DIAGNOSIS — M25.561 ACUTE BILATERAL KNEE PAIN: ICD-10-CM

## 2025-07-07 DIAGNOSIS — F32.A ANXIETY AND DEPRESSION: ICD-10-CM

## 2025-07-07 DIAGNOSIS — E11.42 TYPE 2 DIABETES MELLITUS WITH DIABETIC POLYNEUROPATHY, WITHOUT LONG-TERM CURRENT USE OF INSULIN (HCC): Primary | ICD-10-CM

## 2025-07-07 DIAGNOSIS — M25.462 EFFUSION OF LEFT KNEE: ICD-10-CM

## 2025-07-07 DIAGNOSIS — N64.4 PAIN OF BOTH BREASTS: ICD-10-CM

## 2025-07-07 DIAGNOSIS — M62.81 MUSCLE WEAKNESS: ICD-10-CM

## 2025-07-07 DIAGNOSIS — Z86.718 HISTORY OF DVT (DEEP VEIN THROMBOSIS): ICD-10-CM

## 2025-07-07 DIAGNOSIS — M25.661 STIFFNESS OF RIGHT KNEE: ICD-10-CM

## 2025-07-07 DIAGNOSIS — R26.2 DIFFICULTY WALKING: ICD-10-CM

## 2025-07-07 DIAGNOSIS — M25.562 ACUTE BILATERAL KNEE PAIN: ICD-10-CM

## 2025-07-07 DIAGNOSIS — F41.9 ANXIETY AND DEPRESSION: ICD-10-CM

## 2025-07-07 PROCEDURE — 97110 THERAPEUTIC EXERCISES: CPT | Performed by: PHYSICAL THERAPIST

## 2025-07-07 PROCEDURE — 3078F DIAST BP <80 MM HG: CPT | Performed by: PHYSICIAN ASSISTANT

## 2025-07-07 PROCEDURE — 3044F HG A1C LEVEL LT 7.0%: CPT | Performed by: PHYSICIAN ASSISTANT

## 2025-07-07 PROCEDURE — 99215 OFFICE O/P EST HI 40 MIN: CPT | Performed by: PHYSICIAN ASSISTANT

## 2025-07-07 PROCEDURE — 97530 THERAPEUTIC ACTIVITIES: CPT | Performed by: PHYSICAL THERAPIST

## 2025-07-07 PROCEDURE — 3074F SYST BP LT 130 MM HG: CPT | Performed by: PHYSICIAN ASSISTANT

## 2025-07-07 PROCEDURE — 99417 PROLNG OP E/M EACH 15 MIN: CPT | Performed by: PHYSICIAN ASSISTANT

## 2025-07-07 RX ORDER — DULOXETIN HYDROCHLORIDE 60 MG/1
60 CAPSULE, DELAYED RELEASE ORAL 2 TIMES DAILY
Qty: 180 CAPSULE | Refills: 3 | Status: SHIPPED | OUTPATIENT
Start: 2025-07-07

## 2025-07-07 RX ORDER — GABAPENTIN 300 MG/1
600 CAPSULE ORAL 2 TIMES DAILY
Qty: 360 CAPSULE | Refills: 3 | Status: SHIPPED | OUTPATIENT
Start: 2025-07-07 | End: 2026-07-02

## 2025-07-07 NOTE — PROGRESS NOTES
[facility]  [address] [place] [id number]  Dept: [phone]      Physical Therapy Daily Note     Referring MD: Eric Basilio APRN *  Diagnosis:     ICD-10-CM    1. Knee stiffness, left  M25.662       2. Difficulty walking  R26.2       3. Stiffness of right knee  M25.661       4. Effusion of left knee  M25.462       5. Muscle weakness  M62.81       6. Impaired functional mobility, balance, gait, and endurance  Z74.09       7. Acute bilateral knee pain  M25.561     M25.562           Surgery: Left LEG DEBRIDEMENT INCISION AND DRAINAGE - Left Date: 1/13/2025  Therapy precautions:Fall risk and Gait belt for dynamic standing activity, eliquis for several more months  Co-morbidities affecting plan of care: ORIF tibia ~1998 that limited knee flexion, medial meniscus tear R knee, B DVT 2025, diabetes with neuropathy  Chief complaints/history of injury: Pt noted R knee discomfort with every step while walking her dog ~3/25/23 and then felt a loud pop in the knee while going up the steps to the house. Pt diagnosed with medial meniscus tear by Dr. JOLEEN Lindo and she completed 4 sessions of PT at this location 5/17/23-5/30/23, stopping due to extended out of town travel. Pt followed up with Dr. JOLEEN Lindo 9/12/23 and elected to hold off on injection. She was referred ankle/foot for R ankle injury where she received a R ankle lace up brace to address arthritis and calcaneonavicular coalition. Pt caught R toe on steps 9/30/24 and was seen in ED due to increased R knee pain. Pt was discharged home in knee immobilizer with referral to outpatient ortho ([facility] Landaver[facility]) where she had joint aspiration and steroid injection to the R knee. Pt returned to the ED 10/7/24 with worsening R knee pain and pain B lower legs. She was admitted and treated for B lower extremity DVTs and then discharged to short term rehab 10/16/24. Pt states that she had a poor experience in rehab with minimal

## 2025-07-07 NOTE — PATIENT INSTRUCTIONS
Reviewed safety and duration of Eliquis use and determined that it would be appropriate for her to discontinue use given that she is moving around well on a daily basis and is 6+ months post op from knee surgery  Counseled to reduce intake of high fat/cholesterol foods such as fried foods, red meats, and certain dairy products (cheese, ice cream, butter/margarine, egg yolks, whole milk products) to help reduce cholesterol values - suggest choosing baked, broiled, or grilled instead of fried & more white meat (turkey cifuentes, turkey sausage, fish & chicken) instead of red meat  Reminded that annual diabetic eye exam is extremely overdue and needs to be scheduled ASAP  Provided contact number for Dr. Correa via vascular surgery to enable her to reschedule overdue follow up for splenic artery aneurysm  Reviewed vaccines she is eligible for but patient declines  Recommend eliminating caffeine from her diet by switching to Kizzy Zero or Crush Zero instead of Sunkist Zero which hopefully will lessen/alleviate her breast pain  Advised to remind any future surgeons that she has a previous history of DVT incase prophylactic use of blood thinners is warranted  Continue chronic medications as prescribed  Monitor blood pressure 2-4 times/month and keep record to bring to next appointment

## 2025-07-10 ENCOUNTER — HOSPITAL ENCOUNTER (OUTPATIENT)
Dept: ULTRASOUND IMAGING | Age: 60
Discharge: HOME | End: 2025-07-13
Attending: STUDENT IN AN ORGANIZED HEALTH CARE EDUCATION/TRAINING PROGRAM
Payer: COMMERCIAL

## 2025-07-10 ENCOUNTER — HOSPITAL ENCOUNTER (OUTPATIENT)
Dept: CT IMAGING | Age: 60
Discharge: HOME | End: 2025-07-13
Attending: STUDENT IN AN ORGANIZED HEALTH CARE EDUCATION/TRAINING PROGRAM
Payer: COMMERCIAL

## 2025-07-10 DIAGNOSIS — I10 HTN (HYPERTENSION): ICD-10-CM

## 2025-07-10 DIAGNOSIS — Z87.891 PERSONAL HISTORY OF TOBACCO USE: ICD-10-CM

## 2025-07-10 DIAGNOSIS — E78.5 HLD (HYPERLIPIDEMIA): ICD-10-CM

## 2025-07-10 DIAGNOSIS — I72.8 SPLENIC ARTERY ANEURYSM: ICD-10-CM

## 2025-07-10 PROCEDURE — 93975 VASCULAR STUDY: CPT | Performed by: RADIOLOGY

## 2025-07-10 PROCEDURE — 71271 CT THORAX LUNG CANCER SCR C-: CPT

## 2025-07-10 PROCEDURE — 93975 VASCULAR STUDY: CPT

## 2025-07-15 ENCOUNTER — TREATMENT (OUTPATIENT)
Age: 60
End: 2025-07-15
Payer: COMMERCIAL

## 2025-07-15 DIAGNOSIS — M25.662 KNEE STIFFNESS, LEFT: Primary | ICD-10-CM

## 2025-07-15 DIAGNOSIS — M62.81 MUSCLE WEAKNESS: ICD-10-CM

## 2025-07-15 DIAGNOSIS — M25.462 EFFUSION OF LEFT KNEE: ICD-10-CM

## 2025-07-15 DIAGNOSIS — M25.661 STIFFNESS OF RIGHT KNEE: ICD-10-CM

## 2025-07-15 DIAGNOSIS — Z74.09 IMPAIRED FUNCTIONAL MOBILITY, BALANCE, GAIT, AND ENDURANCE: ICD-10-CM

## 2025-07-15 DIAGNOSIS — R26.2 DIFFICULTY WALKING: ICD-10-CM

## 2025-07-15 PROCEDURE — 97110 THERAPEUTIC EXERCISES: CPT | Performed by: PHYSICAL THERAPIST

## 2025-07-15 PROCEDURE — 97116 GAIT TRAINING THERAPY: CPT | Performed by: PHYSICAL THERAPIST

## 2025-07-15 NOTE — PROGRESS NOTES
therapy session  Gait without device 10 feet x 4, SBA- appropriate UE swing  Side step without support 10 feet x 3 B  Reciprocal steps forwards over 5 small hurdles x 4, running hand along counter to work on foot clearance  Side step over 5 small hurdles x 5 B with light UE support at counter to work on appropriate hip and knee flexion for foot clearance  Reciprocal steps 4 stairs x 3 with min assist BUEs at rails, SBA 6-inch step ups x 10 B, BUE support  Standing without UE support, BlazePod clap program, 30 sec x 4 w /30 sec rest. Pods on mirror    Sit-stand from wide chair  + airex pad 2x10 without UE support        ASSESSMENT     Pt arrived with single point cane and was able to ambulate in gym without device demonstrating significant improvements in stability from last attempts. Pt with improved arm swing and worked on foot clearance during session. Stability on stairs also improved with greatest difficulty in stair descent leading R due to continued eccentric quad weakness.      PLAN     Add 4-inch lateral step downs    Effective Dates/Duration: 6/10/2025 TO 7/23/2025  (42 days).    Frequency: 1x/week   Interventions may include but are not limited to:   (97865) PT re-evaluation  (71161) Therapeutic exercise to develop ROM, strength, endurance and flexibility  (51904) Therapeutic activities using dynamic activities to improve function  (76561) Gait training to address mechanics, proper step length and weight shifting to improve household and community mobility as well as overall safety with ADLs  (90799) Manual therapy techniques to improve joint and/or soft tissue mobility, ROM, and function as well as helping to decrease pain/spasms and swelling      GOALS     Goals:    Updated Long term goals to be met by 7/23/25:  Pt will increase knee flexion AROM to at least 100° R knee and 110° L knee for improved transfers and stairs.  Pt will complete Timed Up and Go in </= 18 sec using tripod cane.  Pt will complete

## 2025-07-21 ENCOUNTER — TELEPHONE (OUTPATIENT)
Age: 60
End: 2025-07-21

## 2025-07-21 NOTE — TELEPHONE ENCOUNTER
Pt did not show for their scheduled therapy appointment today.    Reason: unknown  Communication: No answer or voicemail at mobile phone. Left message at home phone requesting call back to reschedule.

## 2025-07-28 ENCOUNTER — TREATMENT (OUTPATIENT)
Age: 60
End: 2025-07-28
Payer: COMMERCIAL

## 2025-07-28 DIAGNOSIS — M25.661 STIFFNESS OF RIGHT KNEE: ICD-10-CM

## 2025-07-28 DIAGNOSIS — M25.462 EFFUSION OF LEFT KNEE: ICD-10-CM

## 2025-07-28 DIAGNOSIS — Z74.09 IMPAIRED FUNCTIONAL MOBILITY, BALANCE, GAIT, AND ENDURANCE: ICD-10-CM

## 2025-07-28 DIAGNOSIS — R26.2 DIFFICULTY WALKING: ICD-10-CM

## 2025-07-28 DIAGNOSIS — M25.662 KNEE STIFFNESS, LEFT: Primary | ICD-10-CM

## 2025-07-28 DIAGNOSIS — M62.81 MUSCLE WEAKNESS: ICD-10-CM

## 2025-07-28 PROCEDURE — 97110 THERAPEUTIC EXERCISES: CPT | Performed by: PHYSICAL THERAPIST

## 2025-07-28 NOTE — PROGRESS NOTES
[facility]  [address] [facility] [address]  Dept: [phone]      Physical Therapy Discharge     Referring MD: Eric Basilio APRN *  Diagnosis:     ICD-10-CM    1. Knee stiffness, left  M25.662       2. Difficulty walking  R26.2       3. Stiffness of right knee  M25.661       4. Effusion of left knee  M25.462       5. Muscle weakness  M62.81       6. Impaired functional mobility, balance, gait, and endurance  Z74.09           Surgery: Left LEG DEBRIDEMENT INCISION AND DRAINAGE - Left Date: 1/13/2025  Therapy precautions:Fall risk and Gait belt for dynamic standing activity, eliquis for several more months  Co-morbidities affecting plan of care: ORIF tibia ~1998 that limited knee flexion, medial meniscus tear R knee, B DVT 2025, diabetes with neuropathy  Chief complaints/history of injury: Pt noted R knee discomfort with every step while walking her dog ~3/25/23 and then felt a loud pop in the knee while going up the steps to the house. Pt diagnosed with medial meniscus tear by Dr. JOLEEN Lindo and she completed 4 sessions of PT at this location 5/17/23-5/30/23, stopping due to extended out of town travel. Pt followed up with Dr. JOLEEN Lindo 9/12/23 and elected to hold off on injection. She was referred ankle/foot for R ankle injury where she received a R ankle lace up brace to address arthritis and calcaneonavicular coalition. Pt caught R toe on steps 9/30/24 and was seen in ED due to increased R knee pain. Pt was discharged home in knee immobilizer with referral to outpatient ortho ([facility] Landaverde) where she had joint aspiration and steroid injection to the R knee. Pt returned to the ED 10/7/24 with worsening R knee pain and pain B lower legs. She was admitted and treated for B lower extremity DVTs and then discharged to short term rehab 10/16/24. Pt states that she had a poor experience in rehab with minimal activity and was discharged home after ~ 2 months. Pt reports

## 2025-08-18 ENCOUNTER — TELEPHONE (OUTPATIENT)
Dept: ORTHOPEDIC SURGERY | Age: 60
End: 2025-08-18

## 2025-09-02 ENCOUNTER — OFFICE VISIT (OUTPATIENT)
Dept: ORTHOPEDIC SURGERY | Age: 60
End: 2025-09-02
Payer: COMMERCIAL

## 2025-09-02 DIAGNOSIS — M17.11 PRIMARY OSTEOARTHRITIS OF RIGHT KNEE: Primary | ICD-10-CM

## 2025-09-02 PROCEDURE — 99214 OFFICE O/P EST MOD 30 MIN: CPT | Performed by: ORTHOPAEDIC SURGERY

## 2025-09-02 PROCEDURE — 20610 DRAIN/INJ JOINT/BURSA W/O US: CPT | Performed by: ORTHOPAEDIC SURGERY

## (undated) DEVICE — SYRINGE MED 10ML LUERLOCK TIP W/O SFTY DISP

## (undated) DEVICE — PREVENA INCISION MANAGEMENT SYSTEM- PEEL & PLACE DRESSING: Brand: PREVENA™ PEEL & PLACE™

## (undated) DEVICE — STERILE SYNTHETIC POLYISOPRENE POWDER-FREE SURGICAL GLOVES WITH HYDROGEL COATING, SMOOTH FINISH, STRAIGHT FINGER: Brand: PROTEXIS

## (undated) DEVICE — SUTURE MONOCRYL STRATAFIX SPRL + SZ 2-0 L18IN ABSRB UD CT-1 SXMP1B413

## (undated) DEVICE — 450 ML BOTTLE OF 0.05% CHLORHEXIDINE GLUCONATE IN 99.95% STERILE WATER FOR IRRIGATION, USP AND APPLICATOR.: Brand: IRRISEPT ANTIMICROBIAL WOUND LAVAGE

## (undated) DEVICE — TOTAL KNEE: Brand: MEDLINE INDUSTRIES, INC.

## (undated) DEVICE — CELLERATERX® SURGICAL ACTIVATED COLLAGEN® POWDER IS TYPE I BOVINE HYDROLYZED COLLAGEN AND CONTAINS NO ADDITIVES.: Brand: CELLERATERX SURGICAL

## (undated) DEVICE — SOLUTION IRRIG 1000ML 0.9% SOD CHL USP POUR PLAS BTL

## (undated) DEVICE — BANDAGE,GAUZE,BULKEE II,4.5"X4.1YD,STRL: Brand: MEDLINE

## (undated) DEVICE — STERILE PRESSURE PROTECTOR PAD® FOR DE MAYO UNIVERSAL DISTRACTOR® (10/CASE): Brand: DE MAYO UNIVERSAL DISTRACTOR®

## (undated) DEVICE — SUIT SURG ISOLATN ZIP TOGA 3XL T7 +

## (undated) DEVICE — GLOVE SURG SZ 8 L12IN FNGR THK79MIL GRN LTX FREE

## (undated) DEVICE — GLOVE SURG SZ 9 THK91MIL LTX FREE SYN POLYISOPRENE ANTI

## (undated) DEVICE — HEWSON SUTURE RETRIEVER: Brand: HEWSON SUTURE RETRIEVER

## (undated) DEVICE — GOWN,REINFORCED,POLY,SIRUS,XLNG/XXLG: Brand: MEDLINE

## (undated) DEVICE — SOLUTION IV 250ML 0.9% SOD CHL PH 5 INJ USP VIAFLX PLAS

## (undated) DEVICE — SOLUTION IRRIG 3000ML 0.9% SOD CHL USP UROMATIC PLAS CONT

## (undated) DEVICE — NEEDLE HYPO 21GA L1.5IN INTRAMUSCULAR S STL LATCH BVL UP

## (undated) DEVICE — HOOD: Brand: T7PLUS

## (undated) DEVICE — GLOVE SURG SZ 65 L12IN FNGR THK79MIL GRN LTX FREE

## (undated) DEVICE — STERILE PVP: Brand: MEDLINE INDUSTRIES, INC.

## (undated) DEVICE — SUTURE MONOCRYL SZ 2-0 L27IN ABSRB UD CP-1 1 L36MM 1/2 CIR REV Y266H

## (undated) DEVICE — SOLUTION IRRIG 1000ML STRL H2O USP PLAS POUR BTL

## (undated) DEVICE — CONTAINER,SPECIMEN,O.R.STRL,4.5OZ: Brand: MEDLINE

## (undated) DEVICE — BIPOLAR SEALER 23-112-1 AQM 6.0: Brand: AQUAMANTYS ®

## (undated) DEVICE — GLOVE SURG SZ 65 THK91MIL LTX FREE SYN POLYISOPRENE